# Patient Record
Sex: FEMALE | Race: WHITE | NOT HISPANIC OR LATINO
[De-identification: names, ages, dates, MRNs, and addresses within clinical notes are randomized per-mention and may not be internally consistent; named-entity substitution may affect disease eponyms.]

---

## 2018-05-23 ENCOUNTER — APPOINTMENT (OUTPATIENT)
Dept: VASCULAR SURGERY | Facility: CLINIC | Age: 83
End: 2018-05-23
Payer: MEDICARE

## 2018-05-23 PROBLEM — Z00.00 ENCOUNTER FOR PREVENTIVE HEALTH EXAMINATION: Status: ACTIVE | Noted: 2018-05-23

## 2018-05-23 PROCEDURE — 93880 EXTRACRANIAL BILAT STUDY: CPT

## 2018-09-27 ENCOUNTER — INPATIENT (INPATIENT)
Facility: HOSPITAL | Age: 83
LOS: 3 days | Discharge: HOME | DRG: 305 | End: 2018-10-01
Attending: INTERNAL MEDICINE | Admitting: INTERNAL MEDICINE
Payer: MEDICARE

## 2018-09-27 VITALS
SYSTOLIC BLOOD PRESSURE: 196 MMHG | WEIGHT: 151.9 LBS | TEMPERATURE: 98 F | DIASTOLIC BLOOD PRESSURE: 94 MMHG | RESPIRATION RATE: 16 BRPM | OXYGEN SATURATION: 97 % | HEART RATE: 74 BPM

## 2018-09-27 LAB
ALBUMIN SERPL ELPH-MCNC: 4.6 G/DL — SIGNIFICANT CHANGE UP (ref 3.3–5)
ALP SERPL-CCNC: 47 U/L — SIGNIFICANT CHANGE UP (ref 40–120)
ALT FLD-CCNC: 14 U/L — SIGNIFICANT CHANGE UP (ref 10–45)
ANION GAP SERPL CALC-SCNC: 13 MMOL/L — SIGNIFICANT CHANGE UP (ref 5–17)
APTT BLD: 28.5 SEC — SIGNIFICANT CHANGE UP (ref 27.5–37.4)
AST SERPL-CCNC: 17 U/L — SIGNIFICANT CHANGE UP (ref 10–40)
BASOPHILS # BLD AUTO: 0 K/UL — SIGNIFICANT CHANGE UP (ref 0–0.2)
BASOPHILS NFR BLD AUTO: 0.3 % — SIGNIFICANT CHANGE UP (ref 0–2)
BILIRUB SERPL-MCNC: 0.2 MG/DL — SIGNIFICANT CHANGE UP (ref 0.2–1.2)
BUN SERPL-MCNC: 14 MG/DL — SIGNIFICANT CHANGE UP (ref 7–23)
CALCIUM SERPL-MCNC: 9.9 MG/DL — SIGNIFICANT CHANGE UP (ref 8.4–10.5)
CHLORIDE SERPL-SCNC: 88 MMOL/L — LOW (ref 96–108)
CO2 SERPL-SCNC: 26 MMOL/L — SIGNIFICANT CHANGE UP (ref 22–31)
CREAT SERPL-MCNC: 0.84 MG/DL — SIGNIFICANT CHANGE UP (ref 0.5–1.3)
EOSINOPHIL # BLD AUTO: 0 K/UL — SIGNIFICANT CHANGE UP (ref 0–0.5)
EOSINOPHIL NFR BLD AUTO: 0.3 % — SIGNIFICANT CHANGE UP (ref 0–6)
GLUCOSE SERPL-MCNC: 120 MG/DL — HIGH (ref 70–99)
HCT VFR BLD CALC: 39.5 % — SIGNIFICANT CHANGE UP (ref 34.5–45)
HGB BLD-MCNC: 13.3 G/DL — SIGNIFICANT CHANGE UP (ref 11.5–15.5)
INR BLD: 1.08 RATIO — SIGNIFICANT CHANGE UP (ref 0.88–1.16)
LYMPHOCYTES # BLD AUTO: 1.2 K/UL — SIGNIFICANT CHANGE UP (ref 1–3.3)
LYMPHOCYTES # BLD AUTO: 11.6 % — LOW (ref 13–44)
MCHC RBC-ENTMCNC: 29.1 PG — SIGNIFICANT CHANGE UP (ref 27–34)
MCHC RBC-ENTMCNC: 33.7 GM/DL — SIGNIFICANT CHANGE UP (ref 32–36)
MCV RBC AUTO: 86.3 FL — SIGNIFICANT CHANGE UP (ref 80–100)
MONOCYTES # BLD AUTO: 0.7 K/UL — SIGNIFICANT CHANGE UP (ref 0–0.9)
MONOCYTES NFR BLD AUTO: 6.7 % — SIGNIFICANT CHANGE UP (ref 2–14)
NEUTROPHILS # BLD AUTO: 8.3 K/UL — HIGH (ref 1.8–7.4)
NEUTROPHILS NFR BLD AUTO: 81.1 % — HIGH (ref 43–77)
PLATELET # BLD AUTO: 322 K/UL — SIGNIFICANT CHANGE UP (ref 150–400)
POTASSIUM SERPL-MCNC: 4.5 MMOL/L — SIGNIFICANT CHANGE UP (ref 3.5–5.3)
POTASSIUM SERPL-SCNC: 4.5 MMOL/L — SIGNIFICANT CHANGE UP (ref 3.5–5.3)
PROT SERPL-MCNC: 7.4 G/DL — SIGNIFICANT CHANGE UP (ref 6–8.3)
PROTHROM AB SERPL-ACNC: 11.7 SEC — SIGNIFICANT CHANGE UP (ref 9.8–12.7)
RBC # BLD: 4.57 M/UL — SIGNIFICANT CHANGE UP (ref 3.8–5.2)
RBC # FLD: 12.6 % — SIGNIFICANT CHANGE UP (ref 10.3–14.5)
SODIUM SERPL-SCNC: 127 MMOL/L — LOW (ref 135–145)
TROPONIN T, HIGH SENSITIVITY RESULT: 17 NG/L — SIGNIFICANT CHANGE UP (ref 0–51)
WBC # BLD: 10.2 K/UL — SIGNIFICANT CHANGE UP (ref 3.8–10.5)
WBC # FLD AUTO: 10.2 K/UL — SIGNIFICANT CHANGE UP (ref 3.8–10.5)

## 2018-09-27 PROCEDURE — 71046 X-RAY EXAM CHEST 2 VIEWS: CPT | Mod: 26

## 2018-09-27 PROCEDURE — 99285 EMERGENCY DEPT VISIT HI MDM: CPT | Mod: 25

## 2018-09-27 PROCEDURE — 70450 CT HEAD/BRAIN W/O DYE: CPT | Mod: 26

## 2018-09-27 PROCEDURE — 93010 ELECTROCARDIOGRAM REPORT: CPT

## 2018-09-27 RX ORDER — LOSARTAN POTASSIUM 100 MG/1
25 TABLET, FILM COATED ORAL ONCE
Qty: 0 | Refills: 0 | Status: COMPLETED | OUTPATIENT
Start: 2018-09-27 | End: 2018-09-27

## 2018-09-27 RX ADMIN — LOSARTAN POTASSIUM 25 MILLIGRAM(S): 100 TABLET, FILM COATED ORAL at 22:48

## 2018-09-27 NOTE — ED ADULT NURSE NOTE - OBJECTIVE STATEMENT
84 yo female complaining of "I took my BP at home today and it was higher than usual 215/73 and I called my doctor, I took all my BP medication and the BP remained high so he told me to come to the ED. I am feeling off today, cannot explain but just shaky and off balance. I do not feel chest pain, SOB, headache or any other pain". Pt alerted and oriented x3, no signs of acute distress noted at this moment except high BP. MD at the bedside, will continue to monitor closely. heart sounds normal, lungs clear, abdomen soft, non distended.

## 2018-09-27 NOTE — ED PROVIDER NOTE - NS ED ROS FT
Constitutional: No fever or chills  Eyes: No visual changes, eye pain or redness  HEENT: No throat pain, ear pain, nasal pain. No nose bleeding.  CV: No chest pain or lower extremity edema  Resp: No SOB no cough  GI: No abd pain. No nausea or vomiting. No diarrhea. No constipation.   : No dysuria, hematuria.   MSK: No musculoskeletal pain  Skin: No rash  Neuro: No headache. No numbness or tingling. No weakness. +lightheadedness, resolved. Unsteady gait, baseline.

## 2018-09-27 NOTE — ED ADULT NURSE NOTE - NSIMPLEMENTINTERV_GEN_ALL_ED
Implemented All Universal Safety Interventions:  [place] to call system. Call bell, personal items and telephone within reach. Instruct patient to call for assistance. Room bathroom lighting operational. Non-slip footwear when patient is off stretcher. Physically safe environment: no spills, clutter or unnecessary equipment. Stretcher in lowest position, wheels locked, appropriate side rails in place.

## 2018-09-27 NOTE — ED PROVIDER NOTE - OBJECTIVE STATEMENT
84 y/o F pmhx htn and 'balance problems' longstanding, presenting with episode of elevated BP at home associated with lightheadedness. Patient states that she was in her usual state of health today when she started to feel lightheaded and had some 'maybe weakness' of both of her arms. She states that she checked her BP at that time and was 215/80. At that time she called her pmd Dr. Isaac Figueroa and he instructed her to come to the ER. Pt states that she takes metoprolol 50mg twice daily, verapamil 240mg once daily and HCTZ a few times per week. She reports that she took her nighttime dose of metoprolol and her HCTZ when noting this number and then came to the ER. She states that she 'felt slightly unbalanced' on her feet, but not different from her baseline. She denies any chest pain, shortness of breath, nausea, blurred vision, headache, changes in vision, back pain, numbness or tingling.

## 2018-09-27 NOTE — ED PROVIDER NOTE - MEDICAL DECISION MAKING DETAILS
Elderly woman c HTN on multiple anti-hypertensive agents but states has not been compliant c HCTZ (takes 1/wk instead of 3/wk).  Describes vague ?lightheadedness? v weakness in LE.  Not present currently, does not seem c/w CVA or TIA -- symptoms were bilateral, no paresthesias, no slurring of speech or AMS.  At present patient has no symptoms.  HTN is downtrending -- initially 200s systolic, now 170s/80s in b/l UE.  Presentation is not c/w dissection.  Very doubtful to represent acute coronary syndrome.  Neuro exam entirely WNL.  Will check trop, ekg, CT head, labs, and s/w her cardiologist.  --MUNIRM

## 2018-09-27 NOTE — ED PROVIDER NOTE - PROGRESS NOTE DETAILS
Case and plan discussed with Dr. Figueroa. Informed him that BP has come down without intervention to 177/83 and bilateral UE BPs are equal. CT head negative, xray normal. He reports that he wants to add another medication to her regimen, recommending losartan 25mg daily. Agrees that if all labs are within normal limits patient can be stable to be d/c'ed to follow-up with him. -MARCIAL BallardC Case and plan discussed with Dr. Figueroa. Informed him that BP has come down without intervention to 177/83 and bilateral UE BPs are equal. CT head negative, xray normal. He reports that he wants to add another medication to her regimen, recommending losartan 25mg daily. Agrees that if all labs are within normal limits patient can be stable to be d/c'ed to follow-up with him. Patient has scheduled appointment to see Dr. Figueroa tomorrow. Will keep this appointment. -Katia Plaza PA-C Patient's BP elevated again, evaluation of bloodwork reveals significant hyponatremia. discussed again with Dr. Figueroa who states patient has had hyponatremia in the past but not this significant at time when not taking HCTZ consistently. He recommends admission to the hospital and giving another dose of metoprolol 25mg PO at this time and will continue to attempt tighter control as inpatient. -Katia Plaza PA-C Patient's BP elevated again, evaluation of bloodwork reveals significant hyponatremia. discussed again with Dr. Figueroa who states patient has had hyponatremia in the past but not this significant at time when not taking HCTZ consistently. He recommends admission to the hospital and giving another dose of metoprolol 25mg PO at this time and will continue to attempt tighter control as inpatient. -Katia Plaza PA-C  Attending Statement: Agree with the above.  Given hyponatremia and continued bp 200/80s (though asymptomatic) pt should be admitted.  She is euvolemic; will fluid restrict at this time pending ulytes.  --BMM

## 2018-09-28 DIAGNOSIS — I10 ESSENTIAL (PRIMARY) HYPERTENSION: ICD-10-CM

## 2018-09-28 DIAGNOSIS — I16.0 HYPERTENSIVE URGENCY: ICD-10-CM

## 2018-09-28 DIAGNOSIS — K21.9 GASTRO-ESOPHAGEAL REFLUX DISEASE WITHOUT ESOPHAGITIS: ICD-10-CM

## 2018-09-28 DIAGNOSIS — R00.0 TACHYCARDIA, UNSPECIFIED: ICD-10-CM

## 2018-09-28 DIAGNOSIS — E87.1 HYPO-OSMOLALITY AND HYPONATREMIA: ICD-10-CM

## 2018-09-28 DIAGNOSIS — Z98.49 CATARACT EXTRACTION STATUS, UNSPECIFIED EYE: Chronic | ICD-10-CM

## 2018-09-28 LAB
ANION GAP SERPL CALC-SCNC: 11 MMOL/L — SIGNIFICANT CHANGE UP (ref 5–17)
BASOPHILS # BLD AUTO: 0.01 K/UL — SIGNIFICANT CHANGE UP (ref 0–0.2)
BASOPHILS NFR BLD AUTO: 0.1 % — SIGNIFICANT CHANGE UP (ref 0–2)
BUN SERPL-MCNC: 12 MG/DL — SIGNIFICANT CHANGE UP (ref 7–23)
CALCIUM SERPL-MCNC: 9.5 MG/DL — SIGNIFICANT CHANGE UP (ref 8.4–10.5)
CHLORIDE SERPL-SCNC: 90 MMOL/L — LOW (ref 96–108)
CHLORIDE UR-SCNC: 69 MMOL/L — SIGNIFICANT CHANGE UP
CO2 SERPL-SCNC: 25 MMOL/L — SIGNIFICANT CHANGE UP (ref 22–31)
CREAT SERPL-MCNC: 0.81 MG/DL — SIGNIFICANT CHANGE UP (ref 0.5–1.3)
EOSINOPHIL # BLD AUTO: 0.08 K/UL — SIGNIFICANT CHANGE UP (ref 0–0.5)
EOSINOPHIL NFR BLD AUTO: 0.9 % — SIGNIFICANT CHANGE UP (ref 0–6)
GLUCOSE SERPL-MCNC: 91 MG/DL — SIGNIFICANT CHANGE UP (ref 70–99)
HCT VFR BLD CALC: 36.6 % — SIGNIFICANT CHANGE UP (ref 34.5–45)
HGB BLD-MCNC: 11.9 G/DL — SIGNIFICANT CHANGE UP (ref 11.5–15.5)
IMM GRANULOCYTES NFR BLD AUTO: 0.2 % — SIGNIFICANT CHANGE UP (ref 0–1.5)
LYMPHOCYTES # BLD AUTO: 1.97 K/UL — SIGNIFICANT CHANGE UP (ref 1–3.3)
LYMPHOCYTES # BLD AUTO: 21.9 % — SIGNIFICANT CHANGE UP (ref 13–44)
MAGNESIUM SERPL-MCNC: 1.8 MG/DL — SIGNIFICANT CHANGE UP (ref 1.6–2.6)
MCHC RBC-ENTMCNC: 27.7 PG — SIGNIFICANT CHANGE UP (ref 27–34)
MCHC RBC-ENTMCNC: 32.5 GM/DL — SIGNIFICANT CHANGE UP (ref 32–36)
MCV RBC AUTO: 85.3 FL — SIGNIFICANT CHANGE UP (ref 80–100)
MONOCYTES # BLD AUTO: 1.09 K/UL — HIGH (ref 0–0.9)
MONOCYTES NFR BLD AUTO: 12.1 % — SIGNIFICANT CHANGE UP (ref 2–14)
NEUTROPHILS # BLD AUTO: 5.84 K/UL — SIGNIFICANT CHANGE UP (ref 1.8–7.4)
NEUTROPHILS NFR BLD AUTO: 64.8 % — SIGNIFICANT CHANGE UP (ref 43–77)
OSMOLALITY SERPL: 268 MOS/KG — LOW (ref 275–300)
PHOSPHATE SERPL-MCNC: 3.6 MG/DL — SIGNIFICANT CHANGE UP (ref 2.5–4.5)
PLATELET # BLD AUTO: 295 K/UL — SIGNIFICANT CHANGE UP (ref 150–400)
POTASSIUM SERPL-MCNC: 4 MMOL/L — SIGNIFICANT CHANGE UP (ref 3.5–5.3)
POTASSIUM SERPL-SCNC: 4 MMOL/L — SIGNIFICANT CHANGE UP (ref 3.5–5.3)
POTASSIUM UR-SCNC: 42 MMOL/L — SIGNIFICANT CHANGE UP
RBC # BLD: 4.29 M/UL — SIGNIFICANT CHANGE UP (ref 3.8–5.2)
RBC # FLD: 13.5 % — SIGNIFICANT CHANGE UP (ref 10.3–14.5)
SODIUM SERPL-SCNC: 126 MMOL/L — LOW (ref 135–145)
SODIUM UR-SCNC: 74 MMOL/L — SIGNIFICANT CHANGE UP
TROPONIN T, HIGH SENSITIVITY RESULT: 16 NG/L — SIGNIFICANT CHANGE UP (ref 0–51)
TSH SERPL-MCNC: 7.78 UIU/ML — HIGH (ref 0.27–4.2)
WBC # BLD: 9.01 K/UL — SIGNIFICANT CHANGE UP (ref 3.8–10.5)
WBC # FLD AUTO: 9.01 K/UL — SIGNIFICANT CHANGE UP (ref 3.8–10.5)

## 2018-09-28 PROCEDURE — 99223 1ST HOSP IP/OBS HIGH 75: CPT | Mod: AI

## 2018-09-28 RX ORDER — METOPROLOL TARTRATE 50 MG
50 TABLET ORAL EVERY 12 HOURS
Qty: 0 | Refills: 0 | Status: DISCONTINUED | OUTPATIENT
Start: 2018-09-28 | End: 2018-10-01

## 2018-09-28 RX ORDER — LOSARTAN POTASSIUM 100 MG/1
25 TABLET, FILM COATED ORAL AT BEDTIME
Qty: 0 | Refills: 0 | Status: DISCONTINUED | OUTPATIENT
Start: 2018-09-28 | End: 2018-10-01

## 2018-09-28 RX ORDER — VERAPAMIL HCL 240 MG
120 CAPSULE, EXTENDED RELEASE PELLETS 24 HR ORAL DAILY
Qty: 0 | Refills: 0 | Status: DISCONTINUED | OUTPATIENT
Start: 2018-09-28 | End: 2018-10-01

## 2018-09-28 RX ORDER — HEPARIN SODIUM 5000 [USP'U]/ML
5000 INJECTION INTRAVENOUS; SUBCUTANEOUS EVERY 12 HOURS
Qty: 0 | Refills: 0 | Status: DISCONTINUED | OUTPATIENT
Start: 2018-09-28 | End: 2018-10-01

## 2018-09-28 RX ORDER — METOPROLOL TARTRATE 50 MG
1 TABLET ORAL
Qty: 0 | Refills: 0 | COMMUNITY

## 2018-09-28 RX ORDER — VERAPAMIL HCL 240 MG
1 CAPSULE, EXTENDED RELEASE PELLETS 24 HR ORAL
Qty: 0 | Refills: 0 | COMMUNITY

## 2018-09-28 RX ORDER — PANTOPRAZOLE SODIUM 20 MG/1
40 TABLET, DELAYED RELEASE ORAL
Qty: 0 | Refills: 0 | Status: DISCONTINUED | OUTPATIENT
Start: 2018-09-28 | End: 2018-10-01

## 2018-09-28 RX ORDER — PREGABALIN 225 MG/1
1000 CAPSULE ORAL ONCE
Qty: 0 | Refills: 0 | Status: COMPLETED | OUTPATIENT
Start: 2018-09-28 | End: 2018-09-29

## 2018-09-28 RX ORDER — METOPROLOL TARTRATE 50 MG
25 TABLET ORAL ONCE
Qty: 0 | Refills: 0 | Status: COMPLETED | OUTPATIENT
Start: 2018-09-28 | End: 2018-09-28

## 2018-09-28 RX ADMIN — Medication 50 MILLIGRAM(S): at 19:03

## 2018-09-28 RX ADMIN — Medication 120 MILLIGRAM(S): at 21:24

## 2018-09-28 RX ADMIN — LOSARTAN POTASSIUM 25 MILLIGRAM(S): 100 TABLET, FILM COATED ORAL at 21:28

## 2018-09-28 RX ADMIN — PANTOPRAZOLE SODIUM 40 MILLIGRAM(S): 20 TABLET, DELAYED RELEASE ORAL at 05:06

## 2018-09-28 RX ADMIN — Medication 25 MILLIGRAM(S): at 01:34

## 2018-09-28 NOTE — H&P ADULT - PROBLEM SELECTOR PLAN 3
etiology most likely 2 hctz doses as pt admits to severe hyponatremia with hctz in past  would avoid diuretics and use other class agents for bp control  ck serum and urine osm, ck tsh and cortisol as pt appears euvolemic

## 2018-09-28 NOTE — CONSULT NOTE ADULT - SUBJECTIVE AND OBJECTIVE BOX
FULL NOTE TO FOLLOW        Patient seen and evaluated @   Chief Complaint:     HPI:  85 f with htn, GERD, "fast heart beat" a/w concern for elevated BP.  Pt states she has a long h/o htn requiring med adjustments.  Takes home bp measurements a few times a week, noticed this last week sbps more elevated up to 170 with low 142.  States no change in food or drink intake and had not been compliant with hctz.  Pt also states that in the past she was admitted for severe hyponatremia and takes hctz prn elevated bp's.  She took a dose on Tuesday.  Yesterday she started to feel weakness in her legs and a sense of light-headedness.  She also says her arms felt "funny" and "almost numb" briefly.  Was having calf cramps.  She took her bp and it was 215/80.  She quickly took a dose of hctz and metoprolol 50mg and called her PMD.  She was told to go to ED.    In ED initial bp 205/72.  Pt was asymptomatic.  Given metoprolol 25mg and losartan 25mg.  bp improved to 165/72.  of note pt had na 127 and previous sodium levels in computer wnl.  No ha/n/v/cp/sob/cough/palps. (28 Sep 2018 04:02)    PMH:   Gastroesophageal reflux disease, esophagitis presence not specified  Hypertension    PSH:   History of cataract extraction, unspecified laterality    Medications:   heparin  Injectable 5000 Unit(s) SubCutaneous every 12 hours  metoprolol tartrate 50 milliGRAM(s) Oral every 12 hours  pantoprazole    Tablet 40 milliGRAM(s) Oral before breakfast  predniSONE   Tablet 3 milliGRAM(s) Oral daily    Allergies:  penicillin (Hives)    FAMILY HISTORY:  Family history of acute myocardial infarction (Father)    Social History:  Smoking:  Alcohol:  Drugs:    REVIEW OF SYSTEMS:  CONSTITUTIONAL: No weakness, fevers or chills  EYES/ENT: No visual changes;  No vertigo or throat pain   NECK: No pain or stiffness  RESPIRATORY: No cough, wheezing, hemoptysis; No shortness of breath  CARDIOVASCULAR: No chest pain or palpitations  GASTROINTESTINAL: No abdominal or epigastric pain. No nausea, vomiting, or hematemesis; No diarrhea or constipation. No melena or hematochezia.  GENITOURINARY: No dysuria, frequency or hematuria  NEUROLOGICAL: No numbness or weakness  SKIN: No itching, burning, rashes, or lesions   All other review of systems is negative unless indicated above    Physical Exam:  T(C): 36.6 (09-28-18 @ 08:13), Max: 36.7 (09-27-18 @ 19:32)  HR: 61 (09-28-18 @ 10:11) (51 - 74)  BP: 135/73 (09-28-18 @ 10:11) (127/76 - 205/72)  RR: 18 (09-28-18 @ 08:13) (16 - 18)  SpO2: 96% (09-28-18 @ 08:13) (95% - 99%)  Wt(kg): --    Daily     Daily     Appearance:  Normal, NAD  Eyes:  PERRL, EOMI  HEENT: Normal oral [place], NC/AT  Neck:  No JVD or HJR  Respiratory: Clear to auscultation bilaterally  Cardiovascular: Normal S1 and S2 without murmurs, rubs or gallops  Abdomen:   BS normal, Soft,  Non-tender without organomegally or masses  Extremities: Without edema, pulses are full      Labs:                        11.9   9.01  )-----------( 295      ( 28 Sep 2018 09:30 )             36.6     09-28    126<L>  |  90<L>  |  12  ----------------------------<  91  4.0   |  25  |  0.81    Ca    9.5      28 Sep 2018 05:55  Phos  3.6     09-28  Mg     1.8     09-28    TPro  7.4  /  Alb  4.6  /  TBili  0.2  /  DBili  x   /  AST  17  /  ALT  14  /  AlkPhos  47  09-27    PT/INR - ( 27 Sep 2018 20:37 )   PT: 11.7 sec;   INR: 1.08 ratio         PTT - ( 27 Sep 2018 20:37 )  PTT:28.5 sec                  ECG:    Echo:    Stress Testing:    Cath:    Interpretation of Telemetry:    Imaging: Patient seen and evaluated @ bedside  Chief Complaint: Hypertension    HPI:  85 f sent to hospital for elevated BP.  Pt states she has a long h/o htn requiring med adjustments but has been on stable dose of metoprolol 50 bid , verapamil 240 mg qd and HCTZ 12.5 mg biw (but often only takes once a week).  Takes home bp measurements a few times a week, noticed this last week sbps more elevated up to 170 with low 142.  States no change in food or drink intake and had not been compliant with hctz which she generally only takes once a week.  Pt also states that in the past she was admitted for severe hyponatremia coincident with diarrhea and when on HCTZ ? daily  On her own takes hctz prn elevated bp's but generally never more than 3x/week.  She took a dose on Tuesday.  Yesterday she started to feel weakness in her legs and a sense of light-headedness.  She also says her arms felt "funny" and "almost numb" briefly.  Was having calf cramps.  She took her bp and it was 215/80.  She quickly took a dose of hctz and metoprolol 50mg and called me and was told to go to ED.    In ED initial bp 205/72.  Pt was asymptomatic.  Given metoprolol 25mg and losartan 25mg.  bp improved to 165/72.  of note pt had na 127 and previous sodium levels in computer wnl.  No ha/n/v/cp/sob/cough/palps. 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  Hypertension  Supraventricular tachycardia  Asthma since childhood  Diverticulitis x 3 episodes  Temporal arteritis, PMR on steroids  Moderate carotid disease  Gallstones symptomatic once  Prediabetes  Spinal stenosis  Pernicious anemia  BL carpal tunnel syndrome  Basal cell carcinoma eyelid  Adnexal cyst  IgG kappa paraproteinemia  Vertigo    PSH:   History of cataract extraction, unspecified laterality  T&A as child  Blepharoplasty OD 2013  L lower lid Moh's 2013    Medications:   heparin  Injectable 5000 Unit(s) SubCutaneous every 12 hours  metoprolol tartrate 50 milliGRAM(s) Oral every 12 hours  pantoprazole    Tablet 40 milliGRAM(s) Oral before breakfast  predniSONE   Tablet 3 milliGRAM(s) Oral daily    Allergies:  penicillin (Hives)    FAMILY HISTORY:  Family history of acute myocardial infarction (Father)  Sister CHF, HTN,PVD  Sister bladder cancer, HTN  2 brothers bladder cancer  1 brother PVD    Social History:  Smoking: Never      REVIEW OF SYSTEMS:  CONSTITUTIONAL: No weakness, fevers or chills  EYES/ENT: No visual changes;  No vertigo or throat pain   NECK: No pain or stiffness  RESPIRATORY: No shortness of breath  CARDIOVASCULAR: No chest pain or current palpitations  GASTROINTESTINAL: No abdominal or epigastric pain. No nausea, vomiting, or hematemesis; No diarrhea or constipation. No melena or hematochezia.  NEUROLOGICAL: See HPI    All other review of systems is negative unless indicated above    Physical Exam:  T(C): 36.6 (09-28-18 @ 08:13), Max: 36.7 (09-27-18 @ 19:32)  HR: 61 (09-28-18 @ 10:11) (51 - 74)  BP: 135/73 (09-28-18 @ 10:11) (127/76 - 205/72)  RR: 18 (09-28-18 @ 08:13) (16 - 18)  SpO2: 96% (09-28-18 @ 08:13) (95% - 99%)  Wt(kg): --    Daily     Daily     Appearance:  Normal, NAD  Eyes:  EOMI  HEENT: Normal oral [place], NC/AT  Neck:  No JVD or HJR  Respiratory: Clear to auscultation bilaterally  Cardiovascular: Normal S1 and S2 with 1/6 systolic murmur base and LSB.  NO rubs or gallops  Abdomen:   BS normal, Soft,  Non-tender without organomegaly or masses  Extremities: Without edema      Labs:                        11.9   9.01  )-----------( 295      ( 28 Sep 2018 09:30 )             36.6     09-28    126<L>  |  90<L>  |  12  ----------------------------<  91  4.0   |  25  |  0.81    Ca    9.5      28 Sep 2018 05:55  Phos  3.6     09-28  Mg     1.8     09-28    TPro  7.4  /  Alb  4.6  /  TBili  0.2  /  DBili  x   /  AST  17  /  ALT  14  /  AlkPhos  47  09-27    PT/INR - ( 27 Sep 2018 20:37 )   PT: 11.7 sec;   INR: 1.08 ratio         PTT - ( 27 Sep 2018 20:37 )  PTT:28.5 sec

## 2018-09-28 NOTE — H&P ADULT - HISTORY OF PRESENT ILLNESS
85 f with htn, GERD, "fast heart beat" a/w concern for elevated BP.  Pt states she has a long h/o htn requiring med adjustments.  Takes home bp measurements a few times a week, noticed this last week sbps more elevated up to 170 with low 142.  States no change in food or drink intake and had not been compliant with hctz.  Pt also states that in the past she was admitted for severe hyponatremia and takes hctz prn elevated bp's.  She took a dose on Tuesday.  Yesterday she started to feel weakness in her legs and a sense of light-headedness.  She also says her arms felt "funny" and "almost numb" briefly.  Was having calf cramps.  She took her bp and it was 215/80.  She quickly took a dose of hctz and metoprolol 50mg and called her PMD.  She was told to go to ED.    In ED initial bp 205/72.  Pt was asymptomatic.  Given metoprolol 25mg and losartan 25mg.  bp improved to 165/72.  of note pt had na 127 and previous sodium levels in computer wnl.  No ha/n/v/cp/sob/cough/palps.

## 2018-09-28 NOTE — CONSULT NOTE ADULT - SUBJECTIVE AND OBJECTIVE BOX
HPI:  Ms. Hoyt is an 85 year-old woman with history notable for hypertension, GERD, and cataracts. She presented last night to the [facility] ER with hypertensive urgency. She states that she has had hypertension for several years. She checks her blood pressure at home multiple times per week. Last week she noted that her systolic BP has risen as high as 170mmHg. She attests to complying with her Toprol as ordered, but not being compliant of late with HCTZ, although she did take a dose this past Tuesday. Yesterday she developed weakness of her legs, as well as cramping of the legs, and lightheadedness. She checked her BP and found it to be 215/80. In addition to taking HCTZ and Metoprolol, she called her PMD, who advised her to go to the ER. Initial BP in the ER was 200s/70s. She received a STAT dose of Cozaar 25mg last night.    Ms. Hoyt was noted as well in the ER to have hyponatremia - it was 127meq/L on first check; on the most recent bloodwork it was 126meq/L. In light of the hypertension as well as the hyponatremia, a renal consultation was requested.    Home BP meds: Dyazide 25-37.5 daily; Toprol 50mg BID    PAST MEDICAL & SURGICAL HISTORY:  Gastroesophageal reflux disease, esophagitis presence not specified  Hypertension  History of cataract extraction, unspecified laterality    Allergies  penicillin (Hives)    SOCIAL HISTORY:  Denies ETOh,Smoking,     FAMILY HISTORY:  Family history of acute myocardial infarction (Father)    REVIEW OF SYSTEMS:  CONSTITUTIONAL: (+)weakness; no fevers or chills  EYES/ENT: No visual changes;  No vertigo or throat pain   NECK: No pain or stiffness  RESPIRATORY: No cough, wheezing, hemoptysis; No shortness of breath  CARDIOVASCULAR: No chest pain or palpitations; (+)lightheadedness  GASTROINTESTINAL: No abdominal or epigastric pain. No nausea, vomiting, or hematemesis; No diarrhea or constipation. No melena or hematochezia.  GENITOURINARY: No dysuria, frequency or hematuria  NEUROLOGICAL: No numbness or tremor  SKIN: No itching, burning, rashes, or lesions   MUSCULOSKELETAL: (+)leg weakness; (+)cramping of legs  All other review of systems is negative unless indicated above.    VITAL:  T(C): , Max: 36.7 (09-27-18 @ 19:32)  T(F): , Max: 98.1 (09-27-18 @ 19:32)  HR: 61 (09-28-18 @ 10:11)  BP: 135/73 (09-28-18 @ 10:11)  RR: 18 (09-28-18 @ 08:13)  SpO2: 96% (09-28-18 @ 08:13)    PHYSICAL EXAM:  Constitutional: NAD, Alert  HEENT: NCAT, MMM  Neck: Supple, No JVD  Respiratory: CTA-b/l  Cardiovascular: RRR s1s2, no m/r/g  Gastrointestinal: BS+, soft, NT/ND  Extremities: No peripheral edema b/l  Neurological: no focal deficits; strength grossly intact  Psychiatric: Normal mood, normal affect  Back: no CVAT b/l  Skin: No rashes, no nevi    LABS:                        11.9   9.01  )-----------( 295      ( 28 Sep 2018 09:30 )             36.6     Na(126)/K(4.0)/Cl(90)/HCO3(25)/BUN(12)/Cr(0.81)Glu(91)/Ca(9.5)/Mg(1.8)/PO4(3.6)    09-28 @ 05:55  Na(127)/K(4.5)/Cl(88)/HCO3(26)/BUN(14)/Cr(0.84)Glu(120)/Ca(9.9)/Mg(--)/PO4(--)    09-27 @ 20:37    IMAGING:  < from: Xray Chest 2 Views PA/Lat (09.27.18 @ 20:52) >  Clear lungs.    ASSESSMENT:  (1)HTN - suboptimal control of late - unclear as to why her BP kaylin so high, so fast. Due to hypervolemia from lack of compliance with her diuretic? Her CXR is clear. Of note, BP is much improved at present, on Toprol, and s/p Cozaar last night.    (2)Hyponatremia - likely thiazide-induced      RECOMMEND:  (1)1 liter free water restriction  (2)No further thiazides for now  (3)No need for salt tabs nor for samsca for now  (4)BMP daily  (5)Toprol as taken at home  (6)Cozaar 25mg po qhs for now    Thank you for involving [facility] in this patient's care.    With warm regards,    Ulices Campos MD   [facility]  [phone] HPI:  Ms. Hoyt is an 85 year-old woman with history notable for hypertension, GERD, and cataracts. She presented last night to the [facility] ER with hypertensive urgency. She states that she has had hypertension for several years. She checks her blood pressure at home multiple times per week. Last week she noted that her systolic BP has risen as high as 170mmHg. She attests to complying with her Toprol as ordered, but not being compliant of late with HCTZ, although she did take a dose this past Tuesday. Yesterday she developed weakness of her legs, as well as cramping of the legs, and lightheadedness. She checked her BP and found it to be 215/80. In addition to taking HCTZ and Metoprolol, she called her PMD, who advised her to go to the ER. Initial BP in the ER was 200s/70s. She received a STAT dose of Cozaar 25mg last night.    Ms. Hoyt was noted as well in the ER to have hyponatremia - it was 127meq/L on first check; on the most recent bloodwork it was 126meq/L. In light of the hypertension as well as the hyponatremia, a renal consultation was requested.    Home BP meds: Dyazide 25-37.5 (once per week on average); Toprol 50mg BID; Verapamil 240 qd    Ms. Hoyt attests to episodic hyponatremia in the past. She states that 1-2 years ago, her sodium level dropped precipitously, requiring admission at [facility]. She states that she drinks 6-8 cups of fluid/day. She eats reasonably well. She admits that prior to admission her urine was "colorless".      PAST MEDICAL & SURGICAL HISTORY:  Gastroesophageal reflux disease, esophagitis presence not specified  Hypertension  History of cataract extraction, unspecified laterality  Hyponatremia    Allergies  penicillin (Hives)    SOCIAL HISTORY:  Denies ETOh,Smoking,     FAMILY HISTORY:  Family history of acute myocardial infarction (Father)    REVIEW OF SYSTEMS:  CONSTITUTIONAL: (+)weakness; no fevers or chills  EYES/ENT: No visual changes;  No vertigo or throat pain   NECK: No pain or stiffness  RESPIRATORY: No cough, wheezing, hemoptysis; No shortness of breath  CARDIOVASCULAR: No chest pain or palpitations; (+)lightheadedness  GASTROINTESTINAL: No abdominal or epigastric pain. No nausea, vomiting, or hematemesis; No diarrhea or constipation. No melena or hematochezia.  GENITOURINARY: No dysuria, frequency or hematuria; (+)colorless urine  NEUROLOGICAL: No numbness or tremor  SKIN: No itching, burning, rashes, or lesions   MUSCULOSKELETAL: (+)leg weakness; (+)cramping of legs  All other review of systems is negative unless indicated above.    VITAL:  T(C): , Max: 36.7 (09-27-18 @ 19:32)  T(F): , Max: 98.1 (09-27-18 @ 19:32)  HR: 61 (09-28-18 @ 10:11)  BP: 135/73 (09-28-18 @ 10:11)  RR: 18 (09-28-18 @ 08:13)  SpO2: 96% (09-28-18 @ 08:13)    PHYSICAL EXAM:  Constitutional: NAD, Alert, pleasant  HEENT: NCAT, MMM  Neck: Supple, No JVD  Respiratory: CTA-b/l  Cardiovascular: RRR s1s2, no m/r/g  Gastrointestinal: BS+, soft, NT/ND  Extremities: No peripheral edema b/l  Neurological: no focal deficits; strength grossly intact  Psychiatric: Normal mood, normal affect  Back: no CVAT b/l  Skin: No rashes, no nevi    LABS:                        11.9   9.01  )-----------( 295      ( 28 Sep 2018 09:30 )             36.6     Na(126)/K(4.0)/Cl(90)/HCO3(25)/BUN(12)/Cr(0.81)Glu(91)/Ca(9.5)/Mg(1.8)/PO4(3.6)    09-28 @ 05:55  Na(127)/K(4.5)/Cl(88)/HCO3(26)/BUN(14)/Cr(0.84)Glu(120)/Ca(9.9)/Mg(--)/PO4(--)    09-27 @ 20:37    IMAGING:  < from: Xray Chest 2 Views PA/Lat (09.27.18 @ 20:52) >  Clear lungs.    ASSESSMENT:  (1)HTN - suboptimal control of late - unclear as to why her BP kaylin so high, so fast. Of note, BP is much improved at present, on Toprol, and s/p Cozaar last night.    (2)Hyponatremia - likely multifactorial from excess water intake relative to osmotic intake, + effect of diuretic. Would look to get urine studies to further investigate, but would like the diuretics out of her system before we check the urine lytes...reasonable to check them tomorrow morning. Hyponatremia is mild enough that for now we could try to get by simply with a free water restriction as well as discontinuation of her diuretics.      RECOMMEND:  (1)1 liter free water restriction  (2)No further thiazides for now  (3)No need for salt tabs nor for samsca for now  (4)BMP daily  (5)Toprol as taken at home  (6)Cozaar 25mg po qhs for now  (7)Urine: lytes, osm...tomorrow a.m.  (8)TSH, uric acid, and serum osm tomorrow with a.m. labs  (9)If serum sodium tomorrow is 125 or lower, would then add NaCl, 1gm po bid.      Thank you for involving [facility] in this patient's care.    With warm regards,    Ulices Campos MD   [facility]  [phone] HPI:  Ms. Hoyt is an 85 year-old woman with history notable for hypertension, GERD, and cataracts. She presented last night to the [facility] ER with hypertensive urgency. She states that she has had hypertension for several years. She checks her blood pressure at home multiple times per week. Last week she noted that her systolic BP has risen as high as 170mmHg. She attests to complying with her Toprol as ordered, but not being compliant of late with HCTZ, although she did take a dose this past Tuesday. Yesterday she developed weakness of her legs, as well as cramping of the legs, and lightheadedness. She checked her BP and found it to be 215/80. In addition to taking HCTZ and Metoprolol, she called her PMD, who advised her to go to the ER. Initial BP in the ER was 200s/70s. She received a STAT dose of Cozaar 25mg last night.    Ms. Hoyt was noted as well in the ER to have hyponatremia - it was 127meq/L on first check; on the most recent bloodwork it was 126meq/L. In light of the hypertension as well as the hyponatremia, a renal consultation was requested.    Home BP meds: Dyazide 25-37.5 (once per week on average); Toprol 50mg BID; Verapamil 240 qd    Ms. Hoyt attests to episodic hyponatremia in the past. She states that 1-2 years ago, her sodium level dropped precipitously, requiring admission at [facility]. She states that she drinks 6-8 cups of fluid/day. She eats reasonably well. She admits that prior to admission her urine was "colorless".      PAST MEDICAL & SURGICAL HISTORY:  Gastroesophageal reflux disease, esophagitis presence not specified  Hypertension  History of cataract extraction, unspecified laterality  Hyponatremia    Allergies  penicillin (Hives)    SOCIAL HISTORY:  Denies ETOh,Smoking,     FAMILY HISTORY:  Family history of acute myocardial infarction (Father)    REVIEW OF SYSTEMS:  CONSTITUTIONAL: (+)weakness; no fevers or chills; (+)episodic sweating  EYES/ENT: No visual changes;  No vertigo or throat pain   NECK: No pain or stiffness  RESPIRATORY: No cough, wheezing, hemoptysis; No shortness of breath  CARDIOVASCULAR: No chest pain or palpitations; (+)lightheadedness  GASTROINTESTINAL: (+)nausea/(+)vomiting/(+)diarrhea, all this afternoon (attributed to bad chicken)  GENITOURINARY: No dysuria, frequency or hematuria; (+)colorless urine  NEUROLOGICAL: No numbness or tremor  SKIN: No itching, burning, rashes, or lesions   MUSCULOSKELETAL: (+)leg weakness; (+)cramping of legs  All other review of systems is negative unless indicated above.    VITAL:  T(C): , Max: 36.7 (09-27-18 @ 19:32)  T(F): , Max: 98.1 (09-27-18 @ 19:32)  HR: 61 (09-28-18 @ 10:11)  BP: 135/73 (09-28-18 @ 10:11)  RR: 18 (09-28-18 @ 08:13)  SpO2: 96% (09-28-18 @ 08:13)    PHYSICAL EXAM:  Constitutional: NAD, Alert, pleasant  HEENT: NCAT, MMM  Neck: Supple, No JVD  Respiratory: CTA-b/l  Cardiovascular: RRR s1s2, no m/r/g  Gastrointestinal: BS+, soft, NT/ND  Extremities: No peripheral edema b/l  Neurological: no focal deficits; strength grossly intact  Psychiatric: Normal mood, normal affect  Back: no CVAT b/l  Skin: No rashes, no nevi    LABS:                        11.9   9.01  )-----------( 295      ( 28 Sep 2018 09:30 )             36.6     Na(126)/K(4.0)/Cl(90)/HCO3(25)/BUN(12)/Cr(0.81)Glu(91)/Ca(9.5)/Mg(1.8)/PO4(3.6)    09-28 @ 05:55  Na(127)/K(4.5)/Cl(88)/HCO3(26)/BUN(14)/Cr(0.84)Glu(120)/Ca(9.9)/Mg(--)/PO4(--)    09-27 @ 20:37    IMAGING:  < from: Xray Chest 2 Views PA/Lat (09.27.18 @ 20:52) >  Clear lungs.    ASSESSMENT:  (1)HTN - suboptimal control of late - unclear as to why her BP kaylin so high, so fast. Of note, BP is much improved at present, on Toprol, and s/p Cozaar last night.    (2)Hyponatremia - likely multifactorial from excess water intake relative to osmotic intake, + effect of diuretic. Would look to get urine studies to further investigate, but would like the diuretics out of her system before we check the urine lytes...reasonable to check them tomorrow morning. Hyponatremia is mild enough that for now we could try to get by simply with a free water restriction as well as discontinuation of her diuretics.      RECOMMEND:  (1)1 liter free water restriction  (2)No further thiazides for now  (3)No need for salt tabs nor for samsca for now  (4)BMP daily  (5)Toprol as taken at home  (6)Cozaar 25mg po qhs for now  (7)Urine: lytes, osm...tomorrow a.m.  (8)TSH, uric acid, and serum osm tomorrow with a.m. labs  (9)If serum sodium tomorrow is 125 or lower, would then add NaCl, 1gm po bid.      Thank you for involving [facility] in this patient's care.    With warm regards,    Ulices Campos MD   [facility]  [phone]

## 2018-09-28 NOTE — H&P ADULT - NSHPLABSRESULTS_GEN_ALL_CORE
labs/studies/ekg reviewed personally by me  wbc 10.2, hb 13.3, plt 322  inr 1.08  na 124, cl 88, bun 14, creat 0.84  nl liver  cth neg  cxr neg  ekg no ischemia, 1st degree block (hard copy not found in ED, interpretation based on ED provider note)

## 2018-09-28 NOTE — CONSULT NOTE ADULT - ASSESSMENT
Impression:  Acute worsening of hypertension of unclear etiology.  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 vasculitis.                        Hyponatremia.  Doubt related to minimal dose of HCTZ generally taken only once weekly and this week twice.  Patient admits to 60 ounces fluid daily.                        History of frequent and prologed episodes of SVT, improved on verapamil.    Plan:  Losartan added to regimen.             Will resume verapamil.             Renal consult re: hyponatremia.             Will order ESR and CRP.

## 2018-09-28 NOTE — H&P ADULT - PROBLEM SELECTOR PLAN 1
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

## 2018-09-29 LAB
ANION GAP SERPL CALC-SCNC: 10 MMOL/L — SIGNIFICANT CHANGE UP (ref 5–17)
BUN SERPL-MCNC: 10 MG/DL — SIGNIFICANT CHANGE UP (ref 7–23)
CALCIUM SERPL-MCNC: 9.5 MG/DL — SIGNIFICANT CHANGE UP (ref 8.4–10.5)
CHLORIDE SERPL-SCNC: 86 MMOL/L — LOW (ref 96–108)
CO2 SERPL-SCNC: 27 MMOL/L — SIGNIFICANT CHANGE UP (ref 22–31)
CREAT SERPL-MCNC: 0.84 MG/DL — SIGNIFICANT CHANGE UP (ref 0.5–1.3)
CRP SERPL-MCNC: 0.85 MG/DL — HIGH (ref 0–0.4)
ERYTHROCYTE [SEDIMENTATION RATE] IN BLOOD: 32 MM/HR — HIGH (ref 0–20)
GLUCOSE SERPL-MCNC: 91 MG/DL — SIGNIFICANT CHANGE UP (ref 70–99)
OSMOLALITY UR: 425 MOS/KG — SIGNIFICANT CHANGE UP (ref 50–1200)
POTASSIUM SERPL-MCNC: 3.6 MMOL/L — SIGNIFICANT CHANGE UP (ref 3.5–5.3)
POTASSIUM SERPL-SCNC: 3.6 MMOL/L — SIGNIFICANT CHANGE UP (ref 3.5–5.3)
SODIUM SERPL-SCNC: 123 MMOL/L — LOW (ref 135–145)
TSH SERPL-MCNC: 4.58 UIU/ML — HIGH (ref 0.27–4.2)
URATE SERPL-MCNC: 4.1 MG/DL — SIGNIFICANT CHANGE UP (ref 2.5–7)

## 2018-09-29 RX ORDER — SODIUM CHLORIDE 5 G/100ML
500 INJECTION, SOLUTION INTRAVENOUS
Qty: 0 | Refills: 0 | Status: COMPLETED | OUTPATIENT
Start: 2018-09-29 | End: 2018-09-29

## 2018-09-29 RX ORDER — SODIUM CHLORIDE 9 MG/ML
1 INJECTION INTRAMUSCULAR; INTRAVENOUS; SUBCUTANEOUS
Qty: 0 | Refills: 0 | Status: DISCONTINUED | OUTPATIENT
Start: 2018-09-29 | End: 2018-10-01

## 2018-09-29 RX ORDER — ONDANSETRON 8 MG/1
4 TABLET, FILM COATED ORAL ONCE
Qty: 0 | Refills: 0 | Status: COMPLETED | OUTPATIENT
Start: 2018-09-29 | End: 2018-09-29

## 2018-09-29 RX ADMIN — SODIUM CHLORIDE 1 GRAM(S): 9 INJECTION INTRAMUSCULAR; INTRAVENOUS; SUBCUTANEOUS at 18:02

## 2018-09-29 RX ADMIN — Medication 50 MILLIGRAM(S): at 13:54

## 2018-09-29 RX ADMIN — Medication 3 MILLIGRAM(S): at 05:33

## 2018-09-29 RX ADMIN — PANTOPRAZOLE SODIUM 40 MILLIGRAM(S): 20 TABLET, DELAYED RELEASE ORAL at 05:32

## 2018-09-29 RX ADMIN — SODIUM CHLORIDE 50 MILLILITER(S): 5 INJECTION, SOLUTION INTRAVENOUS at 11:21

## 2018-09-29 RX ADMIN — Medication 50 MILLIGRAM(S): at 21:24

## 2018-09-29 RX ADMIN — Medication 120 MILLIGRAM(S): at 13:54

## 2018-09-29 RX ADMIN — PREGABALIN 1000 MICROGRAM(S): 225 CAPSULE ORAL at 05:33

## 2018-09-29 RX ADMIN — Medication 5 MILLIGRAM(S): at 11:22

## 2018-09-29 RX ADMIN — LOSARTAN POTASSIUM 25 MILLIGRAM(S): 100 TABLET, FILM COATED ORAL at 21:23

## 2018-09-29 RX ADMIN — ONDANSETRON 4 MILLIGRAM(S): 8 TABLET, FILM COATED ORAL at 09:22

## 2018-09-29 RX ADMIN — SODIUM CHLORIDE 50 MILLILITER(S): 5 INJECTION, SOLUTION INTRAVENOUS at 21:23

## 2018-09-29 NOTE — PROGRESS NOTE ADULT - ASSESSMENT
Assessment  1. Hyponatremia  2. Gastroenteritis  3. HTN  4. ?? Vasculitis    Plan  1. Electrolyte management as per renal  2. Continue current anti-hypertensives

## 2018-09-29 NOTE — PROGRESS NOTE ADULT - SUBJECTIVE AND OBJECTIVE BOX
CHIEF COMPLAINT: complains of an episode of diarrhea and vomitting yesterday      MEDICATIONS:  heparin  Injectable 5000 Unit(s) SubCutaneous every 12 hours  losartan 25 milliGRAM(s) Oral at bedtime  metoprolol tartrate 50 milliGRAM(s) Oral every 12 hours  torsemide 5 milliGRAM(s) Oral <User Schedule>  verapamil  milliGRAM(s) Oral daily          pantoprazole    Tablet 40 milliGRAM(s) Oral before breakfast    predniSONE   Tablet 3 milliGRAM(s) Oral daily    sodium chloride 1 Gram(s) Oral two times a day  sodium chloride 1.5%. 500 milliLiter(s) IV Continuous <Continuous>          PHYSICAL EXAM:  T(C): 36.6 (09-29-18 @ 08:03), Max: 36.8 (09-28-18 @ 21:22)  HR: 57 (09-29-18 @ 09:18) (52 - 69)  BP: 116/51 (09-29-18 @ 09:18) (101/62 - 157/69)  RR: 18 (09-29-18 @ 09:18) (18 - 18)  SpO2: 99% (09-29-18 @ 09:18) (96% - 99%)  Wt(kg): --  I&O's Summary    28 Sep 2018 07:01  -  29 Sep 2018 07:00  --------------------------------------------------------  IN: 620 mL / OUT: 0 mL / NET: 620 mL        Appearance: Normal	  Cardiovascular: Normal S1 S2, No JVD, No murmurs, No edema  Respiratory: Lungs clear to auscultation	  Psychiatry: A & O x 3, Mood & affect appropriate  Gastrointestinal:  Soft, Non-tender, + BS	  Skin: No rashes, No ecchymoses, No cyanosis	  Neurologic: Non-focal  Extremities: Normal range of motion, No clubbing, cyanosis or edema                                    11.9   9.01  )-----------( 295      ( 28 Sep 2018 09:30 )             36.6     09-29    123<L>  |  86<L>  |  10  ----------------------------<  91  3.6   |  27  |  0.84    Ca    9.5      29 Sep 2018 07:19  Phos  3.6     09-28  Mg     1.8     09-28    TPro  7.4  /  Alb  4.6  /  TBili  0.2  /  DBili  x   /  AST  17  /  ALT  14  /  AlkPhos  47  09-27    proBNP:   Lipid Profile:   HgA1c:   TSH: Thyroid Stimulating Hormone, Serum: 4.58 uIU/mL (09-29 @ 08:18)

## 2018-09-29 NOTE — PROGRESS NOTE ADULT - SUBJECTIVE AND OBJECTIVE BOX
NEPHROLOGY - NSN    Patient seen and examined.    MEDICATIONS  (STANDING):  heparin  Injectable 5000 Unit(s) SubCutaneous every 12 hours  losartan 25 milliGRAM(s) Oral at bedtime  metoprolol tartrate 50 milliGRAM(s) Oral every 12 hours  pantoprazole    Tablet 40 milliGRAM(s) Oral before breakfast  predniSONE   Tablet 3 milliGRAM(s) Oral daily  verapamil  milliGRAM(s) Oral daily    VITALS:  T(C): , Max: 36.8 (09-28-18 @ 21:22)  T(F): , Max: 98.2 (09-28-18 @ 21:22)  HR: 57 (09-29-18 @ 09:18)  BP: 116/51 (09-29-18 @ 09:18)  BP(mean): --  RR: 18 (09-29-18 @ 09:18)  SpO2: 99% (09-29-18 @ 09:18)  Wt(kg): --  I and O's:    09-28 @ 07:01  -  09-29 @ 07:00  --------------------------------------------------------  IN: 620 mL / OUT: 0 mL / NET: 620 mL          REVIEW OF SYSTEMS:  Full ROS done and were negative unless otherwise indicated in HPI/assessment.     PHYSICAL EXAM:  Constitutional: NAD  Respiratory: CTA B/L  Cardiovascular: S1 and S2, RRR  Gastrointestinal: + BS, soft, NT, ND  Extremities: No peripheral edema  Neurological: AAO x 3, CN 2-12 intact  : No Goncalves  Access: Not applicable    LABS:                        11.9   9.01  )-----------( 295      ( 28 Sep 2018 09:30 )             36.6     09-29    123<L>  |  86<L>  |  10  ----------------------------<  91  3.6   |  27  |  0.84    Ca    9.5      29 Sep 2018 07:19  Phos  3.6     09-28  Mg     1.8     09-28    TPro  7.4  /  Alb  4.6  /  TBili  0.2  /  DBili  x   /  AST  17  /  ALT  14  /  AlkPhos  47  09-27      Urine Studies:    Osmolality, Random Urine: 425 mos/kg (09-28 @ 20:23)  Sodium, Random Urine: 74 mmol/L (09-28 @ 19:11)  Chloride, Random Urine: 69 mmol/L (09-28 @ 19:11)  Potassium, Random Urine: 42 mmol/L (09-28 @ 19:11)      RADIOLOGY & ADDITIONAL STUDIES:      ASSESSMENT/RECOMMEND    Shayy Patel NP  [facility],   [phone] NEPHROLOGY - NSN    Patient seen and examined.  C/O nausea    MEDICATIONS  (STANDING):  heparin  Injectable 5000 Unit(s) SubCutaneous every 12 hours  losartan 25 milliGRAM(s) Oral at bedtime  metoprolol tartrate 50 milliGRAM(s) Oral every 12 hours  pantoprazole    Tablet 40 milliGRAM(s) Oral before breakfast  predniSONE   Tablet 3 milliGRAM(s) Oral daily  verapamil  milliGRAM(s) Oral daily    VITALS:  T(C): , Max: 36.8 (09-28-18 @ 21:22)  T(F): , Max: 98.2 (09-28-18 @ 21:22)  HR: 57 (09-29-18 @ 09:18)  BP: 116/51 (09-29-18 @ 09:18)  BP(mean): --  RR: 18 (09-29-18 @ 09:18)  SpO2: 99% (09-29-18 @ 09:18)  Wt(kg): --  I and O's:    09-28 @ 07:01  -  09-29 @ 07:00  --------------------------------------------------------  IN: 620 mL / OUT: 0 mL / NET: 620 mL    REVIEW OF SYSTEMS:  Full ROS done and were negative unless otherwise indicated in HPI/assessment.     PHYSICAL EXAM:  Constitutional: NAD  Respiratory: CTA B/L  Cardiovascular: S1 and S2, RRR  Gastrointestinal: + BS, soft, NT, ND  Extremities: No peripheral edema  Neurological: AAO x 3  : No Goncalves  Access: Not applicable    LABS:                        11.9   9.01  )-----------( 295      ( 28 Sep 2018 09:30 )             36.6     09-29    123<L>  |  86<L>  |  10  ----------------------------<  91  3.6   |  27  |  0.84    Ca    9.5      29 Sep 2018 07:19  Phos  3.6     09-28  Mg     1.8     09-28    TPro  7.4  /  Alb  4.6  /  TBili  0.2  /  DBili  x   /  AST  17  /  ALT  14  /  AlkPhos  47  09-27    Urine Studies:  Osmolality, Random Urine: 425 mos/kg (09-28 @ 20:23)  Sodium, Random Urine: 74 mmol/L (09-28 @ 19:11)  Chloride, Random Urine: 69 mmol/L (09-28 @ 19:11)  Potassium, Random Urine: 42 mmol/L (09-28 @ 19:11)    RADIOLOGY & ADDITIONAL STUDIES:    ASSESSMENT/RECOMMEND  (1)HTN - suboptimal control of late - unclear as to why her BP kaylin so high, so fast. 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 Campos and RN    Shayy Patel NP  [facility]  [phone]

## 2018-09-30 LAB
ANION GAP SERPL CALC-SCNC: 9 MMOL/L — SIGNIFICANT CHANGE UP (ref 5–17)
BUN SERPL-MCNC: 10 MG/DL — SIGNIFICANT CHANGE UP (ref 7–23)
CALCIUM SERPL-MCNC: 9.6 MG/DL — SIGNIFICANT CHANGE UP (ref 8.4–10.5)
CHLORIDE SERPL-SCNC: 92 MMOL/L — LOW (ref 96–108)
CO2 SERPL-SCNC: 29 MMOL/L — SIGNIFICANT CHANGE UP (ref 22–31)
CREAT SERPL-MCNC: 0.95 MG/DL — SIGNIFICANT CHANGE UP (ref 0.5–1.3)
GLUCOSE SERPL-MCNC: 85 MG/DL — SIGNIFICANT CHANGE UP (ref 70–99)
OSMOLALITY SERPL: 260 MOS/KG — LOW (ref 275–300)
POTASSIUM SERPL-MCNC: 4.2 MMOL/L — SIGNIFICANT CHANGE UP (ref 3.5–5.3)
POTASSIUM SERPL-SCNC: 4.2 MMOL/L — SIGNIFICANT CHANGE UP (ref 3.5–5.3)
SODIUM SERPL-SCNC: 130 MMOL/L — LOW (ref 135–145)

## 2018-09-30 RX ADMIN — LOSARTAN POTASSIUM 25 MILLIGRAM(S): 100 TABLET, FILM COATED ORAL at 21:03

## 2018-09-30 RX ADMIN — Medication 50 MILLIGRAM(S): at 05:32

## 2018-09-30 RX ADMIN — PANTOPRAZOLE SODIUM 40 MILLIGRAM(S): 20 TABLET, DELAYED RELEASE ORAL at 05:33

## 2018-09-30 RX ADMIN — SODIUM CHLORIDE 1 GRAM(S): 9 INJECTION INTRAMUSCULAR; INTRAVENOUS; SUBCUTANEOUS at 18:12

## 2018-09-30 RX ADMIN — Medication 50 MILLIGRAM(S): at 18:12

## 2018-09-30 RX ADMIN — SODIUM CHLORIDE 1 GRAM(S): 9 INJECTION INTRAMUSCULAR; INTRAVENOUS; SUBCUTANEOUS at 05:32

## 2018-09-30 RX ADMIN — Medication 3 MILLIGRAM(S): at 05:32

## 2018-09-30 RX ADMIN — Medication 120 MILLIGRAM(S): at 05:32

## 2018-09-30 NOTE — PROGRESS NOTE ADULT - SUBJECTIVE AND OBJECTIVE BOX
CHIEF COMPLAINT: comfortable      PHYSICAL EXAM:  T(C): 36.7 (09-30-18 @ 10:07), Max: 37.2 (09-29-18 @ 16:36)  HR: 60 (09-30-18 @ 10:07) (56 - 65)  BP: 120/79 (09-30-18 @ 10:07) (120/79 - 146/66)  RR: 18 (09-30-18 @ 10:07) (18 - 18)  SpO2: 96% (09-30-18 @ 10:07) (95% - 97%)  Wt(kg): --  I&O's Summary    29 Sep 2018 07:01  -  30 Sep 2018 07:00  --------------------------------------------------------  IN: 300 mL / OUT: 400 mL / NET: -100 mL    30 Sep 2018 07:01  -  30 Sep 2018 13:42  --------------------------------------------------------  IN: 240 mL / OUT: 400 mL / NET: -160 mL        Appearance: Normal	  Cardiovascular: Normal S1 S2, No JVD, No murmurs, No edema  Respiratory: Lungs clear to auscultation	  Psychiatry: A & O x 3, Mood & affect appropriate  Gastrointestinal:  Soft, Non-tender, + BS	  Skin: No rashes, No ecchymoses, No cyanosis	  Neurologic: Non-focal  Extremities: Normal range of motion, No clubbing, cyanosis or edema        	  LABS:	 	    CARDIAC MARKERS:              09-30    130<L>  |  92<L>  |  10  ----------------------------<  85  4.2   |  29  |  0.95    Ca    9.6      30 Sep 2018 07:18      proBNP:   Lipid Profile:   HgA1c:   TSH:

## 2018-09-30 NOTE — PROGRESS NOTE ADULT - SUBJECTIVE AND OBJECTIVE BOX
No pain, no shortness of breath      VITAL:  T(C): , Max: 37.2 (09-29-18 @ 16:36)  T(F): , Max: 99 (09-29-18 @ 16:36)  HR: 60 (09-30-18 @ 05:30)  BP: 135/71 (09-30-18 @ 05:30)  RR: 18 (09-30-18 @ 05:30)  SpO2: 97% (09-30-18 @ 05:30)      PHYSICAL EXAM:  Constitutional: NAD, Alert, pleasant  HEENT: NCAT, MMM  Neck: Supple, No JVD  Respiratory: CTA-b/l  Cardiovascular: RRR s1s2, no m/r/g  Gastrointestinal: BS+, soft, NT/ND  Extremities: No peripheral edema b/l  Neurological: no focal deficits; strength grossly intact  Psychiatric: Normal mood, normal affect  Back: no CVAT b/l  Skin: No rashes, no nevi      LABS:                        11.9   9.01  )-----------( 295      ( 28 Sep 2018 09:30 )             36.6     Na(130)/K(4.2)/Cl(92)/HCO3(29)/BUN(10)/Cr(0.95)Glu(85)/Ca(9.6)/Mg(--)/PO4(--)    09-30 @ 07:18  Na(123)/K(3.6)/Cl(86)/HCO3(27)/BUN(10)/Cr(0.84)Glu(91)/Ca(9.5)/Mg(--)/PO4(--)    09-29 @ 07:19  Na(126)/K(4.0)/Cl(90)/HCO3(25)/BUN(12)/Cr(0.81)Glu(91)/Ca(9.5)/Mg(1.8)/PO4(3.6)    09-28 @ 05:55  Na(127)/K(4.5)/Cl(88)/HCO3(26)/BUN(14)/Cr(0.84)Glu(120)/Ca(9.9)/Mg(--)/PO4(--)    09-27 @ 20:37      Osmolality, Random Urine: 425 mos/kg (09-28 @ 20:23)  Sodium, Random Urine: 74 mmol/L (09-28 @ 19:11)  Chloride, Random Urine: 69 mmol/L (09-28 @ 19:11)  Potassium, Random Urine: 42 mmol/L (09-28 @ 19:11)    IMPRESSION: 85F w/ HTN, GERD, and SIADH, 9/28/18 a/w hypertensive urgency/hyponatremia    (1)HTN - BP well controlled over past 1-2 days, on combination of Cozaar, Verapamil, Lopressor, and Torsemide (which was added for management of SIADH but may ultimately assist in her BP control)    (2)Hyponatremia - SIADH - much improved, off Dyazide, on NaCl tabs, on Torsemide, on free water restriction, and s/p 1.5% NS yesterday      RECOMMEND:  (1)No further 1.5%NS  (2)Salt tabs and Torsemide as ordered  (3)Can liberalize free water restriction a bit (1.2L/24h rather than 1L/24h)  (4)Antihypertensives as ordered  (5)D/C planning per primary team; BMP within 1 week of discharge; could f/u at my office 2-4 weeks post discharge (I will overbook if needed)      Ulices Campos MD  [facility]  [phone]

## 2018-09-30 NOTE — PROGRESS NOTE ADULT - ASSESSMENT
Assessment  1. Hyponatremia - resolved  2. Gastroenteritis  3. HTN  4. ?? Vasculitis    Plan  1. Electrolyte management as per renal  2. Continue current anti-hypertensives  3. If Na remains stable tomorrow, will d/c home

## 2018-10-01 ENCOUNTER — TRANSCRIPTION ENCOUNTER (OUTPATIENT)
Age: 83
End: 2018-10-01

## 2018-10-01 VITALS
OXYGEN SATURATION: 96 % | DIASTOLIC BLOOD PRESSURE: 70 MMHG | RESPIRATION RATE: 18 BRPM | TEMPERATURE: 98 F | HEIGHT: 61 IN | HEART RATE: 57 BPM | SYSTOLIC BLOOD PRESSURE: 142 MMHG

## 2018-10-01 LAB
ANION GAP SERPL CALC-SCNC: 9 MMOL/L — SIGNIFICANT CHANGE UP (ref 5–17)
BUN SERPL-MCNC: 8 MG/DL — SIGNIFICANT CHANGE UP (ref 7–23)
CALCIUM SERPL-MCNC: 9.6 MG/DL — SIGNIFICANT CHANGE UP (ref 8.4–10.5)
CHLORIDE SERPL-SCNC: 95 MMOL/L — LOW (ref 96–108)
CO2 SERPL-SCNC: 28 MMOL/L — SIGNIFICANT CHANGE UP (ref 22–31)
CREAT SERPL-MCNC: 0.9 MG/DL — SIGNIFICANT CHANGE UP (ref 0.5–1.3)
GLUCOSE SERPL-MCNC: 99 MG/DL — SIGNIFICANT CHANGE UP (ref 70–99)
POTASSIUM SERPL-MCNC: 4.7 MMOL/L — SIGNIFICANT CHANGE UP (ref 3.5–5.3)
POTASSIUM SERPL-SCNC: 4.7 MMOL/L — SIGNIFICANT CHANGE UP (ref 3.5–5.3)
SODIUM SERPL-SCNC: 132 MMOL/L — LOW (ref 135–145)

## 2018-10-01 RX ORDER — SODIUM CHLORIDE 9 MG/ML
1 INJECTION INTRAMUSCULAR; INTRAVENOUS; SUBCUTANEOUS
Qty: 60 | Refills: 0 | OUTPATIENT
Start: 2018-10-01 | End: 2018-10-30

## 2018-10-01 RX ORDER — VERAPAMIL HCL 240 MG
240 CAPSULE, EXTENDED RELEASE PELLETS 24 HR ORAL DAILY
Qty: 0 | Refills: 0 | Status: DISCONTINUED | OUTPATIENT
Start: 2018-10-01 | End: 2018-10-01

## 2018-10-01 RX ORDER — TRIAMTERENE/HYDROCHLOROTHIAZID 75 MG-50MG
1 TABLET ORAL
Qty: 0 | Refills: 0 | COMMUNITY

## 2018-10-01 RX ORDER — SODIUM CHLORIDE 9 MG/ML
1 INJECTION INTRAMUSCULAR; INTRAVENOUS; SUBCUTANEOUS
Qty: 60 | Refills: 0
Start: 2018-10-01 | End: 2018-10-30

## 2018-10-01 RX ORDER — VERAPAMIL HCL 240 MG
120 CAPSULE, EXTENDED RELEASE PELLETS 24 HR ORAL ONCE
Qty: 0 | Refills: 0 | Status: COMPLETED | OUTPATIENT
Start: 2018-10-01 | End: 2018-10-01

## 2018-10-01 RX ORDER — LOSARTAN POTASSIUM 100 MG/1
1 TABLET, FILM COATED ORAL
Qty: 30 | Refills: 0
Start: 2018-10-01 | End: 2018-10-30

## 2018-10-01 RX ORDER — LOSARTAN POTASSIUM 100 MG/1
1 TABLET, FILM COATED ORAL
Qty: 30 | Refills: 0 | OUTPATIENT
Start: 2018-10-01 | End: 2018-10-30

## 2018-10-01 RX ADMIN — PANTOPRAZOLE SODIUM 40 MILLIGRAM(S): 20 TABLET, DELAYED RELEASE ORAL at 05:34

## 2018-10-01 RX ADMIN — Medication 120 MILLIGRAM(S): at 11:06

## 2018-10-01 RX ADMIN — Medication 5 MILLIGRAM(S): at 12:06

## 2018-10-01 RX ADMIN — Medication 120 MILLIGRAM(S): at 05:34

## 2018-10-01 RX ADMIN — Medication 50 MILLIGRAM(S): at 05:34

## 2018-10-01 RX ADMIN — Medication 3 MILLIGRAM(S): at 05:34

## 2018-10-01 RX ADMIN — SODIUM CHLORIDE 1 GRAM(S): 9 INJECTION INTRAMUSCULAR; INTRAVENOUS; SUBCUTANEOUS at 05:33

## 2018-10-01 NOTE — DISCHARGE NOTE ADULT - CARE PROVIDER_API CALL
Isaac Figueroa), Cardiovascular Disease; Internal Medicine  [address]  [address]  [place] [address]  Phone: [phone]  Fax: [phone]    Ulices Campos), Internal Medicine; Nephrology  [address]  [place] [address]  Phone: [phone]  Fax: [phone]

## 2018-10-01 NOTE — DISCHARGE NOTE ADULT - MEDICATION SUMMARY - MEDICATIONS TO TAKE
I will START or STAY ON the medications listed below when I get home from the hospital:    predniSONE 1 mg oral tablet  -- 3 tab(s) by mouth once a day  -- Indication: For Steroid    Tylenol 500 mg oral tablet  -- 2 tab(s) by mouth , As Needed  -- Indication: For Analgesia    losartan 25 mg oral tablet  -- 1 tab(s) by mouth once a day (at bedtime)  -- Indication: For Essential hypertension    verapamil 240 mg/24 hours oral capsule, extended release  -- 1 cap(s) by mouth once a day  -- Indication: For Essential hypertension    metoprolol succinate 25 mg oral tablet, extended release  -- 2 tab(s) by mouth 2 times a day  -- Indication: For Essential hypertension    torsemide 5 mg oral tablet  -- 1 tab(s) by mouth every other day   -- Indication: For Hyponatremia    sodium chloride 1 g oral tablet  -- 1 tab(s) by mouth 2 times a day  -- Indication: For Hyponatremia    Gas-X 80 mg oral tablet, chewable  -- as needed  -- Indication: For GI agent    Refresh ophthalmic solution  -- 1 drop(s) to each affected eye , As Needed  -- Indication: For Opthalmic agent    PriLOSEC 20 mg oral delayed release capsule  -- 1 cap(s) by mouth once a day  -- Indication: For PPI    Vitamin D3 1000 intl units oral tablet  -- Indication: For Supplement

## 2018-10-01 NOTE — DISCHARGE NOTE ADULT - PATIENT PORTAL LINK FT
You can access the [facility], offered by [facility], by registering with the following website: [email or web address]

## 2018-10-01 NOTE — DISCHARGE NOTE ADULT - HOSPITAL COURSE
84 y/o F with PMHx of HTN, GERD a/w hypertensive urgency    Hypertensive urgency: pt received total of 75mg metoprolol last night at presentation  Pt seen by her cardiologist Dr. Figueroa   Increase Verapamil to 240 mg qd which patient was on for both frequent PAT and BP.  Follow up with Dr. Figueroa next week     Gastroesophageal reflux disease, esophagitis presence not specified: cont ppi.     Hyponatremia: etiology most likely 2 hctz doses as pt admits to severe hyponatremia with hctz in past  Pt seen by Nephrologist  Placed on Na+    Tachycardia: pt states h/o fast heart beat, confirm with pmd 84 y/o F with PMHx of HTN, GERD a/w hypertensive urgency    Hypertensive urgency: pt received total of 75mg metoprolol last night at presentation  Pt seen by her cardiologist Dr. Figueroa   Increase Verapamil to 240 mg qd which patient was on for both frequent PAT and BP.  Follow up with Dr. Figueroa next week     Gastroesophageal reflux disease, esophagitis presence not specified: cont ppi.     Hyponatremia: etiology most likely 2 hctz doses as pt admits to severe hyponatremia with hctz in past  Pt seen by Nephrologist  Started on Sodium tabs and Torsemide    Pt d/c with ollow up with Dr. Figueroa this week Friday or next week Monday and  Follow up with Dr. Ulices Campos (Nephrologist) Kidney - 2-4 weeks

## 2018-10-01 NOTE — DISCHARGE NOTE ADULT - PLAN OF CARE
Controlled Increase Verapamil to 240 mg qd which patient was on for both frequent PAT and BP.  Follow up with Dr. Figueroa next week SIADH - much improved, off Dyazide, on NaCl tabs, on Torsemide, on free water restriction, and s/p 1.5% NS yesterday  Check BMP within 1 week and follow up with nephrologist (Dr. Ulices Campos) in 2-4 weeks

## 2018-10-01 NOTE — PROGRESS NOTE ADULT - ASSESSMENT
Impression:  BP under adequate control.                       Na+ improving on fluid restriction, salt tabs and now torsemid qod.    Plan:  Re-increase Verapamil to 240 mg qd which patient was on for both frequent PAT and BP.            If Na+ acceptable today, D?C to home with office f/u late this week or one week from now.

## 2018-10-01 NOTE — DISCHARGE NOTE ADULT - ADDITIONAL INSTRUCTIONS
Follow up with Dr. Figueroa this week Friday or next week Monday   Follow up with Dr. Ulices Campos (Nephrologist) Kidney - 2-4 weeks

## 2018-10-01 NOTE — PROGRESS NOTE ADULT - SUBJECTIVE AND OBJECTIVE BOX
Only minimal diarrhea at present.  BP under acceptable control.  Na+ yesterday 130.  TOday's is pending.    Patient admits to much sweating in general and more in suumer as well as increased fluid intake prior to admission.  Diarrhea started only after admission.        REVIEW OF SYSTEMS:  CARDIOVASCULAR: No chest pain, dyspnea or palpitations  All other review of systems is negative unless indicated above    Medications:  heparin  Injectable 5000 Unit(s) SubCutaneous every 12 hours  losartan 25 milliGRAM(s) Oral at bedtime  metoprolol tartrate 50 milliGRAM(s) Oral every 12 hours  pantoprazole    Tablet 40 milliGRAM(s) Oral before breakfast  predniSONE   Tablet 3 milliGRAM(s) Oral daily  sodium chloride 1 Gram(s) Oral two times a day  torsemide 5 milliGRAM(s) Oral <User Schedule>  verapamil  milliGRAM(s) Oral daily      Physical Exam:  Vitals:  T(C): 36.6 (10-01-18 @ 05:31), Max: 37.1 (09-30-18 @ 20:21)  HR: 61 (10-01-18 @ 05:31) (54 - 61)  BP: 123/72 (10-01-18 @ 05:31) (105/65 - 142/61)  BP(mean): --  RR: 18 (10-01-18 @ 05:31) (18 - 18)  SpO2: 96% (10-01-18 @ 05:31) (96% - 97%)  Wt(kg): --  Daily     Daily   I&O's Summary    30 Sep 2018 07:01  -  01 Oct 2018 07:00  --------------------------------------------------------  IN: 660 mL / OUT: 1100 mL / NET: -440 mL        Appearance:  Normal, NAD  Eyes:  EOMI  Neck:  No JVD  Respiratory: Clear to auscultation bilaterally  Cardiovascular: Normal S1 and S2 with faint systolic murmur LSB.  No rubs or gallops  Abdomen:   BS normal, Soft,  Non-tender without organomegaly or masses  Extremities: Without edema      10-01 pending    09-30      130<L>  |  92<L>  |  10  ----------------------------<  85  4.2   |  29  |  0.95    Ca    9.6      30 Sep 2018 07:18

## 2018-10-01 NOTE — DISCHARGE NOTE ADULT - MEDICATION SUMMARY - MEDICATIONS TO STOP TAKING
I will STOP taking the medications listed below when I get home from the hospital:    hydroCHLOROthiazide 12.5 mg oral capsule  -- 1 cap(s) by mouth once a day    metoprolol tartrate 50 mg oral tablet  -- 1 tab(s) by mouth 2 times a day

## 2018-10-01 NOTE — DISCHARGE NOTE ADULT - CARE PLAN
Principal Discharge DX:	Hypertensive urgency  Goal:	Controlled  Assessment and plan of treatment:	Increase Verapamil to 240 mg qd which patient was on for both frequent PAT and BP.  Follow up with Dr. Figueroa next week  Secondary Diagnosis:	Hyponatremia  Assessment and plan of treatment:	SIADH - much improved, off Dyazide, on NaCl tabs, on Torsemide, on free water restriction, and s/p 1.5% NS yesterday  Check BMP within 1 week and follow up with nephrologist (Dr. Ulices Campos) in 2-4 weeks  Secondary Diagnosis:	Gastroesophageal reflux disease, esophagitis presence not specified

## 2018-10-06 LAB
CORTICOSTEROID BINDING GLOBULIN RESULT: 2.6 MG/DL — SIGNIFICANT CHANGE UP
CORTIS F/TOTAL MFR SERPL: 2.8 % — SIGNIFICANT CHANGE UP
CORTIS SERPL-MCNC: 5.4 UG/DL — SIGNIFICANT CHANGE UP
CORTISOL, FREE RESULT: 0.15 UG/DL — LOW

## 2018-11-11 PROCEDURE — 93005 ELECTROCARDIOGRAM TRACING: CPT

## 2018-11-11 PROCEDURE — 83930 ASSAY OF BLOOD OSMOLALITY: CPT

## 2018-11-11 PROCEDURE — 84100 ASSAY OF PHOSPHORUS: CPT

## 2018-11-11 PROCEDURE — 70450 CT HEAD/BRAIN W/O DYE: CPT

## 2018-11-11 PROCEDURE — 85610 PROTHROMBIN TIME: CPT

## 2018-11-11 PROCEDURE — 82436 ASSAY OF URINE CHLORIDE: CPT

## 2018-11-11 PROCEDURE — 84550 ASSAY OF BLOOD/URIC ACID: CPT

## 2018-11-11 PROCEDURE — 82533 TOTAL CORTISOL: CPT

## 2018-11-11 PROCEDURE — 86140 C-REACTIVE PROTEIN: CPT

## 2018-11-11 PROCEDURE — 71046 X-RAY EXAM CHEST 2 VIEWS: CPT

## 2018-11-11 PROCEDURE — 80053 COMPREHEN METABOLIC PANEL: CPT

## 2018-11-11 PROCEDURE — 84443 ASSAY THYROID STIM HORMONE: CPT

## 2018-11-11 PROCEDURE — 85730 THROMBOPLASTIN TIME PARTIAL: CPT

## 2018-11-11 PROCEDURE — 84300 ASSAY OF URINE SODIUM: CPT

## 2018-11-11 PROCEDURE — 99285 EMERGENCY DEPT VISIT HI MDM: CPT

## 2018-11-11 PROCEDURE — 84484 ASSAY OF TROPONIN QUANT: CPT

## 2018-11-11 PROCEDURE — 83735 ASSAY OF MAGNESIUM: CPT

## 2018-11-11 PROCEDURE — 84133 ASSAY OF URINE POTASSIUM: CPT

## 2018-11-11 PROCEDURE — 83935 ASSAY OF URINE OSMOLALITY: CPT

## 2018-11-11 PROCEDURE — 85652 RBC SED RATE AUTOMATED: CPT

## 2018-11-11 PROCEDURE — 85027 COMPLETE CBC AUTOMATED: CPT

## 2018-11-11 PROCEDURE — 80048 BASIC METABOLIC PNL TOTAL CA: CPT

## 2019-02-23 NOTE — H&P ADULT - FAMILY HISTORY
Alert-The patient is alert, awake and responds to voice. The patient is oriented to time, place, and person. The triage nurse is able to obtain subjective information.
Father  Still living? Unknown  Family history of acute myocardial infarction, Age at diagnosis: Age Unknown

## 2019-05-09 PROBLEM — K21.9 GASTRO-ESOPHAGEAL REFLUX DISEASE WITHOUT ESOPHAGITIS: Chronic | Status: ACTIVE | Noted: 2018-09-28

## 2019-05-09 PROBLEM — I10 ESSENTIAL (PRIMARY) HYPERTENSION: Chronic | Status: ACTIVE | Noted: 2018-09-27

## 2019-05-15 ENCOUNTER — APPOINTMENT (OUTPATIENT)
Dept: VASCULAR SURGERY | Facility: CLINIC | Age: 84
End: 2019-05-15
Payer: MEDICARE

## 2019-05-15 PROCEDURE — 93880 EXTRACRANIAL BILAT STUDY: CPT

## 2019-07-19 ENCOUNTER — EMERGENCY (EMERGENCY)
Facility: HOSPITAL | Age: 84
LOS: 1 days | Discharge: HOME | End: 2019-07-19
Attending: EMERGENCY MEDICINE
Payer: MEDICARE

## 2019-07-19 VITALS
OXYGEN SATURATION: 97 % | HEART RATE: 68 BPM | RESPIRATION RATE: 16 BRPM | SYSTOLIC BLOOD PRESSURE: 185 MMHG | DIASTOLIC BLOOD PRESSURE: 75 MMHG | TEMPERATURE: 98 F

## 2019-07-19 VITALS — HEIGHT: 60 IN | WEIGHT: 149.91 LBS

## 2019-07-19 DIAGNOSIS — Z98.49 CATARACT EXTRACTION STATUS, UNSPECIFIED EYE: Chronic | ICD-10-CM

## 2019-07-19 LAB
ALBUMIN SERPL ELPH-MCNC: 3.6 G/DL — SIGNIFICANT CHANGE UP (ref 3.3–5)
ALP SERPL-CCNC: 45 U/L — SIGNIFICANT CHANGE UP (ref 40–120)
ALT FLD-CCNC: 12 U/L — SIGNIFICANT CHANGE UP (ref 10–45)
ANION GAP SERPL CALC-SCNC: 15 MMOL/L — SIGNIFICANT CHANGE UP (ref 5–17)
APPEARANCE UR: CLEAR — SIGNIFICANT CHANGE UP
AST SERPL-CCNC: 24 U/L — SIGNIFICANT CHANGE UP (ref 10–40)
BACTERIA # UR AUTO: NEGATIVE — SIGNIFICANT CHANGE UP
BASE EXCESS BLDV CALC-SCNC: 0.2 MMOL/L — SIGNIFICANT CHANGE UP (ref -2–2)
BASE EXCESS BLDV CALC-SCNC: 1.2 MMOL/L — SIGNIFICANT CHANGE UP (ref -2–2)
BASOPHILS # BLD AUTO: 0 K/UL — SIGNIFICANT CHANGE UP (ref 0–0.2)
BASOPHILS NFR BLD AUTO: 0.3 % — SIGNIFICANT CHANGE UP (ref 0–2)
BILIRUB SERPL-MCNC: 0.2 MG/DL — SIGNIFICANT CHANGE UP (ref 0.2–1.2)
BILIRUB UR-MCNC: NEGATIVE — SIGNIFICANT CHANGE UP
BUN SERPL-MCNC: 11 MG/DL — SIGNIFICANT CHANGE UP (ref 7–23)
CA-I SERPL-SCNC: 1.15 MMOL/L — SIGNIFICANT CHANGE UP (ref 1.12–1.3)
CA-I SERPL-SCNC: 1.16 MMOL/L — SIGNIFICANT CHANGE UP (ref 1.12–1.3)
CALCIUM SERPL-MCNC: 8.5 MG/DL — SIGNIFICANT CHANGE UP (ref 8.4–10.5)
CHLORIDE BLDV-SCNC: 105 MMOL/L — SIGNIFICANT CHANGE UP (ref 96–108)
CHLORIDE BLDV-SCNC: 97 MMOL/L — SIGNIFICANT CHANGE UP (ref 96–108)
CHLORIDE SERPL-SCNC: 94 MMOL/L — LOW (ref 96–108)
CO2 BLDV-SCNC: 28 MMOL/L — SIGNIFICANT CHANGE UP (ref 22–30)
CO2 BLDV-SCNC: 29 MMOL/L — SIGNIFICANT CHANGE UP (ref 22–30)
CO2 SERPL-SCNC: 22 MMOL/L — SIGNIFICANT CHANGE UP (ref 22–31)
COLOR SPEC: COLORLESS — SIGNIFICANT CHANGE UP
CREAT SERPL-MCNC: 0.83 MG/DL — SIGNIFICANT CHANGE UP (ref 0.5–1.3)
DIFF PNL FLD: NEGATIVE — SIGNIFICANT CHANGE UP
EOSINOPHIL # BLD AUTO: 0.1 K/UL — SIGNIFICANT CHANGE UP (ref 0–0.5)
EOSINOPHIL NFR BLD AUTO: 1.2 % — SIGNIFICANT CHANGE UP (ref 0–6)
EPI CELLS # UR: 0 /HPF — SIGNIFICANT CHANGE UP
GAS PNL BLDV: 129 MMOL/L — LOW (ref 135–145)
GAS PNL BLDV: 135 MMOL/L — SIGNIFICANT CHANGE UP (ref 135–145)
GAS PNL BLDV: SIGNIFICANT CHANGE UP
GLUCOSE BLDV-MCNC: 136 MG/DL — HIGH (ref 70–99)
GLUCOSE BLDV-MCNC: 96 MG/DL — SIGNIFICANT CHANGE UP (ref 70–99)
GLUCOSE SERPL-MCNC: 139 MG/DL — HIGH (ref 70–99)
GLUCOSE UR QL: NEGATIVE — SIGNIFICANT CHANGE UP
HCO3 BLDV-SCNC: 27 MMOL/L — SIGNIFICANT CHANGE UP (ref 21–29)
HCO3 BLDV-SCNC: 27 MMOL/L — SIGNIFICANT CHANGE UP (ref 21–29)
HCT VFR BLD CALC: 35.4 % — SIGNIFICANT CHANGE UP (ref 34.5–45)
HCT VFR BLDA CALC: 33 % — LOW (ref 39–50)
HCT VFR BLDA CALC: 36 % — LOW (ref 39–50)
HGB BLD CALC-MCNC: 10.6 G/DL — LOW (ref 11.5–15.5)
HGB BLD CALC-MCNC: 11.8 G/DL — SIGNIFICANT CHANGE UP (ref 11.5–15.5)
HGB BLD-MCNC: 11.8 G/DL — SIGNIFICANT CHANGE UP (ref 11.5–15.5)
HYALINE CASTS # UR AUTO: 1 /LPF — SIGNIFICANT CHANGE UP (ref 0–2)
KETONES UR-MCNC: NEGATIVE — SIGNIFICANT CHANGE UP
LACTATE BLDV-MCNC: 1.3 MMOL/L — SIGNIFICANT CHANGE UP (ref 0.7–2)
LACTATE BLDV-MCNC: 3.1 MMOL/L — HIGH (ref 0.7–2)
LEUKOCYTE ESTERASE UR-ACNC: ABNORMAL
LIDOCAIN IGE QN: 49 U/L — SIGNIFICANT CHANGE UP (ref 7–60)
LYMPHOCYTES # BLD AUTO: 1.5 K/UL — SIGNIFICANT CHANGE UP (ref 1–3.3)
LYMPHOCYTES # BLD AUTO: 14.6 % — SIGNIFICANT CHANGE UP (ref 13–44)
MCHC RBC-ENTMCNC: 29.3 PG — SIGNIFICANT CHANGE UP (ref 27–34)
MCHC RBC-ENTMCNC: 33.4 GM/DL — SIGNIFICANT CHANGE UP (ref 32–36)
MCV RBC AUTO: 87.8 FL — SIGNIFICANT CHANGE UP (ref 80–100)
MONOCYTES # BLD AUTO: 0.8 K/UL — SIGNIFICANT CHANGE UP (ref 0–0.9)
MONOCYTES NFR BLD AUTO: 8 % — SIGNIFICANT CHANGE UP (ref 2–14)
NEUTROPHILS # BLD AUTO: 8 K/UL — HIGH (ref 1.8–7.4)
NEUTROPHILS NFR BLD AUTO: 76 % — SIGNIFICANT CHANGE UP (ref 43–77)
NITRITE UR-MCNC: NEGATIVE — SIGNIFICANT CHANGE UP
OTHER CELLS CSF MANUAL: 8 ML/DL — LOW (ref 18–22)
PCO2 BLDV: 50 MMHG — SIGNIFICANT CHANGE UP (ref 35–50)
PCO2 BLDV: 60 MMHG — HIGH (ref 35–50)
PH BLDV: 7.28 — LOW (ref 7.35–7.45)
PH BLDV: 7.34 — LOW (ref 7.35–7.45)
PH UR: 6.5 — SIGNIFICANT CHANGE UP (ref 5–8)
PLATELET # BLD AUTO: 240 K/UL — SIGNIFICANT CHANGE UP (ref 150–400)
PO2 BLDV: 27 MMHG — SIGNIFICANT CHANGE UP (ref 25–45)
PO2 BLDV: 32 MMHG — SIGNIFICANT CHANGE UP (ref 25–45)
POTASSIUM BLDV-SCNC: 4.1 MMOL/L — SIGNIFICANT CHANGE UP (ref 3.5–5.3)
POTASSIUM BLDV-SCNC: 4.6 MMOL/L — SIGNIFICANT CHANGE UP (ref 3.5–5.3)
POTASSIUM SERPL-MCNC: 4.4 MMOL/L — SIGNIFICANT CHANGE UP (ref 3.5–5.3)
POTASSIUM SERPL-SCNC: 4.4 MMOL/L — SIGNIFICANT CHANGE UP (ref 3.5–5.3)
PROT SERPL-MCNC: 6.4 G/DL — SIGNIFICANT CHANGE UP (ref 6–8.3)
PROT UR-MCNC: NEGATIVE — SIGNIFICANT CHANGE UP
RBC # BLD: 4.04 M/UL — SIGNIFICANT CHANGE UP (ref 3.8–5.2)
RBC # FLD: 12.3 % — SIGNIFICANT CHANGE UP (ref 10.3–14.5)
RBC CASTS # UR COMP ASSIST: 2 /HPF — SIGNIFICANT CHANGE UP (ref 0–4)
SAO2 % BLDV: 37 % — LOW (ref 67–88)
SAO2 % BLDV: 54 % — LOW (ref 67–88)
SODIUM SERPL-SCNC: 131 MMOL/L — LOW (ref 135–145)
SP GR SPEC: 1.01 — LOW (ref 1.01–1.02)
UROBILINOGEN FLD QL: NEGATIVE — SIGNIFICANT CHANGE UP
WBC # BLD: 10.6 K/UL — HIGH (ref 3.8–10.5)
WBC # FLD AUTO: 10.6 K/UL — HIGH (ref 3.8–10.5)
WBC UR QL: 5 /HPF — SIGNIFICANT CHANGE UP (ref 0–5)

## 2019-07-19 PROCEDURE — 99284 EMERGENCY DEPT VISIT MOD MDM: CPT | Mod: GC

## 2019-07-19 PROCEDURE — 85027 COMPLETE CBC AUTOMATED: CPT

## 2019-07-19 PROCEDURE — 93005 ELECTROCARDIOGRAM TRACING: CPT

## 2019-07-19 PROCEDURE — 82435 ASSAY OF BLOOD CHLORIDE: CPT

## 2019-07-19 PROCEDURE — 83690 ASSAY OF LIPASE: CPT

## 2019-07-19 PROCEDURE — 82803 BLOOD GASES ANY COMBINATION: CPT

## 2019-07-19 PROCEDURE — 82330 ASSAY OF CALCIUM: CPT

## 2019-07-19 PROCEDURE — 85014 HEMATOCRIT: CPT

## 2019-07-19 PROCEDURE — 93010 ELECTROCARDIOGRAM REPORT: CPT

## 2019-07-19 PROCEDURE — 74177 CT ABD & PELVIS W/CONTRAST: CPT | Mod: 26

## 2019-07-19 PROCEDURE — 74177 CT ABD & PELVIS W/CONTRAST: CPT

## 2019-07-19 PROCEDURE — 99284 EMERGENCY DEPT VISIT MOD MDM: CPT

## 2019-07-19 PROCEDURE — 84295 ASSAY OF SERUM SODIUM: CPT

## 2019-07-19 PROCEDURE — 83605 ASSAY OF LACTIC ACID: CPT

## 2019-07-19 PROCEDURE — 84132 ASSAY OF SERUM POTASSIUM: CPT

## 2019-07-19 PROCEDURE — 82962 GLUCOSE BLOOD TEST: CPT

## 2019-07-19 PROCEDURE — 81001 URINALYSIS AUTO W/SCOPE: CPT

## 2019-07-19 PROCEDURE — 80053 COMPREHEN METABOLIC PANEL: CPT

## 2019-07-19 PROCEDURE — 82947 ASSAY GLUCOSE BLOOD QUANT: CPT

## 2019-07-19 RX ORDER — SODIUM CHLORIDE 9 MG/ML
1000 INJECTION, SOLUTION INTRAVENOUS ONCE
Refills: 0 | Status: COMPLETED | OUTPATIENT
Start: 2019-07-19 | End: 2019-07-19

## 2019-07-19 RX ORDER — SODIUM CHLORIDE 9 MG/ML
1000 INJECTION INTRAMUSCULAR; INTRAVENOUS; SUBCUTANEOUS ONCE
Refills: 0 | Status: COMPLETED | OUTPATIENT
Start: 2019-07-19 | End: 2019-07-19

## 2019-07-19 RX ADMIN — SODIUM CHLORIDE 1000 MILLILITER(S): 9 INJECTION, SOLUTION INTRAVENOUS at 18:15

## 2019-07-19 RX ADMIN — SODIUM CHLORIDE 1000 MILLILITER(S): 9 INJECTION INTRAMUSCULAR; INTRAVENOUS; SUBCUTANEOUS at 16:22

## 2019-07-19 NOTE — ED ADULT NURSE REASSESSMENT NOTE - NS ED NURSE REASSESS COMMENT FT1
pt tolerated po challenge; dr chan made aware of sbp 185; pt states her antihypertensive meds are due; pt is asymptomatic

## 2019-07-19 NOTE — ED ADULT NURSE NOTE - CHPI ED NUR SYMPTOMS NEG
no fever/no abdominal distension/no hematuria/no burning urination/no blood in stool/no vomiting/no nausea/no chills/no dysuria

## 2019-07-19 NOTE — ED ADULT NURSE REASSESSMENT NOTE - NS ED NURSE REASSESS COMMENT FT1
resting in bed, appears comfortable; on phone; family at bedside; ivf infusing; repeat labs pending.

## 2019-07-19 NOTE — ED ADULT NURSE NOTE - NSIMPLEMENTINTERV_GEN_ALL_ED
Implemented All Universal Safety Interventions:  [place] to call system. Call bell, personal items and telephone within reach. Instruct patient to call for assistance. Room bathroom lighting operational. Non-slip footwear when patient is off stretcher. Physically safe environment: no spills, clutter or unnecessary equipment. Stretcher in lowest position, wheels locked, appropriate side rails in place.

## 2019-07-19 NOTE — ED PROVIDER NOTE - OBJECTIVE STATEMENT
86F hx Polymyalgia rheumatica, htn p/w abd pain. Patient reports sudden onset LLQ pain radiating to RLQ. Endorses nausea and one episode of nb diarrhea with partial resolution of symptoms. Had [place] chicken and cranberries for lunch. Denies urinary symptoms, vaginal discharge, cp, sob, back pain. Of note, patient is being worked up outpatient for "irregular heart beat" currently wearing a halter monitor.

## 2019-07-19 NOTE — ED PROVIDER NOTE - ATTENDING CONTRIBUTION TO CARE
attending Georgetteack: 86yF h/o PMR, HTN p/w LLQ pain radiating to RLQ x several hours. +nausea with one episode of watery diarrhea. Well appearing with dry MM on exam, abdomen soft with mild b/l LQ tenderness. Will obtain labs, urine, CT A/P, IVF, reassess.

## 2019-07-19 NOTE — ED ADULT NURSE NOTE - OBJECTIVE STATEMENT
87 y/o female hx polymyalgia rheumatica, HTN presents to the ED via EMS from home c/o abd pain and near syncope this afternoon. Pt states after eating had sudden onset of LLQ radiating to RLQ, associated with nausea, diarrhea and near syncope, pt states "I got afraid so I hit my life alert."  Pt states recently had holter monitor in place for "skipped beats." Denies fever, chills, n/v, weakness, constipation, numbness/tingling, urinary s/s, CP. Pt A&Ox3, in no respiratory distress, no CP, neuro intact, abd soft, nontender nondistended, lungs CTA, pt states improved symptoms. PT safety maintained with family at bedside, call bell within reach and bed in the lowest position.

## 2019-07-19 NOTE — ED PROVIDER NOTE - NSFOLLOWUPINSTRUCTIONS_ED_ALL_ED_FT
You were seen today in the emergency room for abdominal pain. Although the testing done today indicates that your pain is not from an acute emergency, your pain could still represent a more serious problem. Most commonly, the pain you are having is likely not something serious and will resolve in a few days, however testing was done today based on the symptoms that you currently have; so if you develop new or worsening symptoms this could be a sign of a problem that was not tested for and means you should come back to the emergency room or see your doctor urgently. You need to follow up with your doctor in the next 48-72 hours. If you develop any new or worsening symptoms you need to return immediately to the emergency department. If you experience any of the following please come right back to the emergency room: severe nausea and vomiting with inability to tolerate eating, severe worsening of your pain, a new fever, new bleeding in stool or vomit, confusion, new numbness or weakness, passing out.

## 2019-09-25 ENCOUNTER — INPATIENT (INPATIENT)
Facility: HOSPITAL | Age: 84
LOS: 0 days | Discharge: HOME | DRG: 392 | End: 2019-09-26
Attending: INTERNAL MEDICINE | Admitting: INTERNAL MEDICINE
Payer: COMMERCIAL

## 2019-09-25 VITALS
SYSTOLIC BLOOD PRESSURE: 180 MMHG | HEART RATE: 71 BPM | RESPIRATION RATE: 20 BRPM | OXYGEN SATURATION: 96 % | DIASTOLIC BLOOD PRESSURE: 72 MMHG | TEMPERATURE: 98 F | HEIGHT: 60 IN | WEIGHT: 149.91 LBS

## 2019-09-25 DIAGNOSIS — K52.9 NONINFECTIVE GASTROENTERITIS AND COLITIS, UNSPECIFIED: ICD-10-CM

## 2019-09-25 DIAGNOSIS — K21.9 GASTRO-ESOPHAGEAL REFLUX DISEASE WITHOUT ESOPHAGITIS: ICD-10-CM

## 2019-09-25 DIAGNOSIS — Z29.9 ENCOUNTER FOR PROPHYLACTIC MEASURES, UNSPECIFIED: ICD-10-CM

## 2019-09-25 DIAGNOSIS — M35.3 POLYMYALGIA RHEUMATICA: ICD-10-CM

## 2019-09-25 DIAGNOSIS — E87.1 HYPO-OSMOLALITY AND HYPONATREMIA: ICD-10-CM

## 2019-09-25 DIAGNOSIS — Z98.49 CATARACT EXTRACTION STATUS, UNSPECIFIED EYE: Chronic | ICD-10-CM

## 2019-09-25 DIAGNOSIS — I10 ESSENTIAL (PRIMARY) HYPERTENSION: ICD-10-CM

## 2019-09-25 LAB
ALBUMIN SERPL ELPH-MCNC: 3.9 G/DL — SIGNIFICANT CHANGE UP (ref 3.3–5)
ALP SERPL-CCNC: 48 U/L — SIGNIFICANT CHANGE UP (ref 40–120)
ALT FLD-CCNC: 12 U/L — SIGNIFICANT CHANGE UP (ref 10–45)
ANION GAP SERPL CALC-SCNC: 10 MMOL/L — SIGNIFICANT CHANGE UP (ref 5–17)
ANION GAP SERPL CALC-SCNC: 13 MMOL/L — SIGNIFICANT CHANGE UP (ref 5–17)
APPEARANCE UR: CLEAR — SIGNIFICANT CHANGE UP
APTT BLD: 27.3 SEC — LOW (ref 27.5–36.3)
AST SERPL-CCNC: 19 U/L — SIGNIFICANT CHANGE UP (ref 10–40)
BACTERIA # UR AUTO: NEGATIVE — SIGNIFICANT CHANGE UP
BASOPHILS # BLD AUTO: 0 K/UL — SIGNIFICANT CHANGE UP (ref 0–0.2)
BASOPHILS NFR BLD AUTO: 0.1 % — SIGNIFICANT CHANGE UP (ref 0–2)
BILIRUB SERPL-MCNC: 0.5 MG/DL — SIGNIFICANT CHANGE UP (ref 0.2–1.2)
BILIRUB UR-MCNC: NEGATIVE — SIGNIFICANT CHANGE UP
BUN SERPL-MCNC: 7 MG/DL — SIGNIFICANT CHANGE UP (ref 7–23)
BUN SERPL-MCNC: 9 MG/DL — SIGNIFICANT CHANGE UP (ref 7–23)
CALCIUM SERPL-MCNC: 9 MG/DL — SIGNIFICANT CHANGE UP (ref 8.4–10.5)
CALCIUM SERPL-MCNC: 9.2 MG/DL — SIGNIFICANT CHANGE UP (ref 8.4–10.5)
CHLORIDE SERPL-SCNC: 89 MMOL/L — LOW (ref 96–108)
CHLORIDE SERPL-SCNC: 95 MMOL/L — LOW (ref 96–108)
CO2 SERPL-SCNC: 26 MMOL/L — SIGNIFICANT CHANGE UP (ref 22–31)
CO2 SERPL-SCNC: 29 MMOL/L — SIGNIFICANT CHANGE UP (ref 22–31)
COLOR SPEC: COLORLESS — SIGNIFICANT CHANGE UP
CREAT SERPL-MCNC: 0.79 MG/DL — SIGNIFICANT CHANGE UP (ref 0.5–1.3)
CREAT SERPL-MCNC: 0.81 MG/DL — SIGNIFICANT CHANGE UP (ref 0.5–1.3)
DIFF PNL FLD: NEGATIVE — SIGNIFICANT CHANGE UP
EOSINOPHIL # BLD AUTO: 0 K/UL — SIGNIFICANT CHANGE UP (ref 0–0.5)
EOSINOPHIL NFR BLD AUTO: 0.2 % — SIGNIFICANT CHANGE UP (ref 0–6)
EPI CELLS # UR: 0 /HPF — SIGNIFICANT CHANGE UP
GAS PNL BLDV: SIGNIFICANT CHANGE UP
GLUCOSE SERPL-MCNC: 142 MG/DL — HIGH (ref 70–99)
GLUCOSE SERPL-MCNC: 89 MG/DL — SIGNIFICANT CHANGE UP (ref 70–99)
GLUCOSE UR QL: NEGATIVE — SIGNIFICANT CHANGE UP
HCT VFR BLD CALC: 36.3 % — SIGNIFICANT CHANGE UP (ref 34.5–45)
HCT VFR BLD CALC: 36.3 % — SIGNIFICANT CHANGE UP (ref 34.5–45)
HGB BLD-MCNC: 11.9 G/DL — SIGNIFICANT CHANGE UP (ref 11.5–15.5)
HGB BLD-MCNC: 11.9 G/DL — SIGNIFICANT CHANGE UP (ref 11.5–15.5)
HYALINE CASTS # UR AUTO: 1 /LPF — SIGNIFICANT CHANGE UP (ref 0–2)
INR BLD: 1.05 RATIO — SIGNIFICANT CHANGE UP (ref 0.88–1.16)
KETONES UR-MCNC: NEGATIVE — SIGNIFICANT CHANGE UP
LEUKOCYTE ESTERASE UR-ACNC: NEGATIVE — SIGNIFICANT CHANGE UP
LIDOCAIN IGE QN: 25 U/L — SIGNIFICANT CHANGE UP (ref 7–60)
LYMPHOCYTES # BLD AUTO: 1.4 K/UL — SIGNIFICANT CHANGE UP (ref 1–3.3)
LYMPHOCYTES # BLD AUTO: 9.4 % — LOW (ref 13–44)
MAGNESIUM SERPL-MCNC: 1.7 MG/DL — SIGNIFICANT CHANGE UP (ref 1.6–2.6)
MCHC RBC-ENTMCNC: 28.6 PG — SIGNIFICANT CHANGE UP (ref 27–34)
MCHC RBC-ENTMCNC: 28.9 PG — SIGNIFICANT CHANGE UP (ref 27–34)
MCHC RBC-ENTMCNC: 32.8 GM/DL — SIGNIFICANT CHANGE UP (ref 32–36)
MCHC RBC-ENTMCNC: 32.9 GM/DL — SIGNIFICANT CHANGE UP (ref 32–36)
MCV RBC AUTO: 87.1 FL — SIGNIFICANT CHANGE UP (ref 80–100)
MCV RBC AUTO: 87.7 FL — SIGNIFICANT CHANGE UP (ref 80–100)
MONOCYTES # BLD AUTO: 1 K/UL — HIGH (ref 0–0.9)
MONOCYTES NFR BLD AUTO: 6.4 % — SIGNIFICANT CHANGE UP (ref 2–14)
NEUTROPHILS # BLD AUTO: 12.9 K/UL — HIGH (ref 1.8–7.4)
NEUTROPHILS NFR BLD AUTO: 83.9 % — HIGH (ref 43–77)
NITRITE UR-MCNC: NEGATIVE — SIGNIFICANT CHANGE UP
PH UR: 7 — SIGNIFICANT CHANGE UP (ref 5–8)
PHOSPHATE SERPL-MCNC: 3.2 MG/DL — SIGNIFICANT CHANGE UP (ref 2.5–4.5)
PLATELET # BLD AUTO: 267 K/UL — SIGNIFICANT CHANGE UP (ref 150–400)
PLATELET # BLD AUTO: 269 K/UL — SIGNIFICANT CHANGE UP (ref 150–400)
POTASSIUM SERPL-MCNC: 3.3 MMOL/L — LOW (ref 3.5–5.3)
POTASSIUM SERPL-MCNC: 3.7 MMOL/L — SIGNIFICANT CHANGE UP (ref 3.5–5.3)
POTASSIUM SERPL-SCNC: 3.3 MMOL/L — LOW (ref 3.5–5.3)
POTASSIUM SERPL-SCNC: 3.7 MMOL/L — SIGNIFICANT CHANGE UP (ref 3.5–5.3)
PROT SERPL-MCNC: 6.5 G/DL — SIGNIFICANT CHANGE UP (ref 6–8.3)
PROT UR-MCNC: NEGATIVE — SIGNIFICANT CHANGE UP
PROTHROM AB SERPL-ACNC: 12.1 SEC — SIGNIFICANT CHANGE UP (ref 10–12.9)
RBC # BLD: 4.14 M/UL — SIGNIFICANT CHANGE UP (ref 3.8–5.2)
RBC # BLD: 4.16 M/UL — SIGNIFICANT CHANGE UP (ref 3.8–5.2)
RBC # FLD: 12 % — SIGNIFICANT CHANGE UP (ref 10.3–14.5)
RBC # FLD: 12.1 % — SIGNIFICANT CHANGE UP (ref 10.3–14.5)
SODIUM SERPL-SCNC: 128 MMOL/L — LOW (ref 135–145)
SODIUM SERPL-SCNC: 134 MMOL/L — LOW (ref 135–145)
SP GR SPEC: 1.02 — SIGNIFICANT CHANGE UP (ref 1.01–1.02)
UROBILINOGEN FLD QL: NEGATIVE — SIGNIFICANT CHANGE UP
WBC # BLD: 13.7 K/UL — HIGH (ref 3.8–10.5)
WBC # BLD: 15.4 K/UL — HIGH (ref 3.8–10.5)
WBC # FLD AUTO: 13.7 K/UL — HIGH (ref 3.8–10.5)
WBC # FLD AUTO: 15.4 K/UL — HIGH (ref 3.8–10.5)
WBC UR QL: 0 /HPF — SIGNIFICANT CHANGE UP (ref 0–5)

## 2019-09-25 PROCEDURE — 99285 EMERGENCY DEPT VISIT HI MDM: CPT | Mod: GC

## 2019-09-25 PROCEDURE — 99223 1ST HOSP IP/OBS HIGH 75: CPT

## 2019-09-25 PROCEDURE — 74177 CT ABD & PELVIS W/CONTRAST: CPT | Mod: 26

## 2019-09-25 RX ORDER — METOPROLOL TARTRATE 50 MG
50 TABLET ORAL ONCE
Refills: 0 | Status: COMPLETED | OUTPATIENT
Start: 2019-09-25 | End: 2019-09-25

## 2019-09-25 RX ORDER — POTASSIUM CHLORIDE 20 MEQ
40 PACKET (EA) ORAL ONCE
Refills: 0 | Status: COMPLETED | OUTPATIENT
Start: 2019-09-25 | End: 2019-09-25

## 2019-09-25 RX ORDER — SODIUM CHLORIDE 9 MG/ML
1 INJECTION INTRAMUSCULAR; INTRAVENOUS; SUBCUTANEOUS DAILY
Refills: 0 | Status: DISCONTINUED | OUTPATIENT
Start: 2019-09-25 | End: 2019-09-26

## 2019-09-25 RX ORDER — METOPROLOL TARTRATE 50 MG
50 TABLET ORAL DAILY
Refills: 0 | Status: DISCONTINUED | OUTPATIENT
Start: 2019-09-25 | End: 2019-09-26

## 2019-09-25 RX ORDER — ENOXAPARIN SODIUM 100 MG/ML
40 INJECTION SUBCUTANEOUS DAILY
Refills: 0 | Status: DISCONTINUED | OUTPATIENT
Start: 2019-09-25 | End: 2019-09-26

## 2019-09-25 RX ORDER — PANTOPRAZOLE SODIUM 20 MG/1
40 TABLET, DELAYED RELEASE ORAL
Refills: 0 | Status: DISCONTINUED | OUTPATIENT
Start: 2019-09-25 | End: 2019-09-26

## 2019-09-25 RX ORDER — SODIUM CHLORIDE 9 MG/ML
500 INJECTION, SOLUTION INTRAVENOUS ONCE
Refills: 0 | Status: COMPLETED | OUTPATIENT
Start: 2019-09-25 | End: 2019-09-25

## 2019-09-25 RX ORDER — METRONIDAZOLE 500 MG
500 TABLET ORAL EVERY 8 HOURS
Refills: 0 | Status: DISCONTINUED | OUTPATIENT
Start: 2019-09-25 | End: 2019-09-25

## 2019-09-25 RX ORDER — CIPROFLOXACIN LACTATE 400MG/40ML
500 VIAL (ML) INTRAVENOUS EVERY 12 HOURS
Refills: 0 | Status: DISCONTINUED | OUTPATIENT
Start: 2019-09-25 | End: 2019-09-26

## 2019-09-25 RX ORDER — VERAPAMIL HCL 240 MG
240 CAPSULE, EXTENDED RELEASE PELLETS 24 HR ORAL DAILY
Refills: 0 | Status: DISCONTINUED | OUTPATIENT
Start: 2019-09-25 | End: 2019-09-26

## 2019-09-25 RX ORDER — LOSARTAN POTASSIUM 100 MG/1
100 TABLET, FILM COATED ORAL DAILY
Refills: 0 | Status: DISCONTINUED | OUTPATIENT
Start: 2019-09-25 | End: 2019-09-26

## 2019-09-25 RX ORDER — ONDANSETRON 8 MG/1
4 TABLET, FILM COATED ORAL ONCE
Refills: 0 | Status: COMPLETED | OUTPATIENT
Start: 2019-09-25 | End: 2019-09-25

## 2019-09-25 RX ORDER — CIPROFLOXACIN LACTATE 400MG/40ML
400 VIAL (ML) INTRAVENOUS ONCE
Refills: 0 | Status: COMPLETED | OUTPATIENT
Start: 2019-09-25 | End: 2019-09-25

## 2019-09-25 RX ORDER — ACETAMINOPHEN 500 MG
650 TABLET ORAL ONCE
Refills: 0 | Status: COMPLETED | OUTPATIENT
Start: 2019-09-25 | End: 2019-09-25

## 2019-09-25 RX ADMIN — SODIUM CHLORIDE 500 MILLILITER(S): 9 INJECTION, SOLUTION INTRAVENOUS at 06:26

## 2019-09-25 RX ADMIN — SODIUM CHLORIDE 500 MILLILITER(S): 9 INJECTION, SOLUTION INTRAVENOUS at 03:29

## 2019-09-25 RX ADMIN — Medication 3 MILLIGRAM(S): at 13:05

## 2019-09-25 RX ADMIN — Medication 500 MILLIGRAM(S): at 17:30

## 2019-09-25 RX ADMIN — Medication 50 MILLIGRAM(S): at 05:24

## 2019-09-25 RX ADMIN — ONDANSETRON 4 MILLIGRAM(S): 8 TABLET, FILM COATED ORAL at 03:29

## 2019-09-25 RX ADMIN — Medication 240 MILLIGRAM(S): at 17:40

## 2019-09-25 RX ADMIN — ENOXAPARIN SODIUM 40 MILLIGRAM(S): 100 INJECTION SUBCUTANEOUS at 11:17

## 2019-09-25 RX ADMIN — PANTOPRAZOLE SODIUM 40 MILLIGRAM(S): 20 TABLET, DELAYED RELEASE ORAL at 11:21

## 2019-09-25 RX ADMIN — Medication 40 MILLIEQUIVALENT(S): at 05:23

## 2019-09-25 RX ADMIN — Medication 650 MILLIGRAM(S): at 12:14

## 2019-09-25 RX ADMIN — Medication 200 MILLIGRAM(S): at 06:27

## 2019-09-25 RX ADMIN — SODIUM CHLORIDE 1 GRAM(S): 9 INJECTION INTRAMUSCULAR; INTRAVENOUS; SUBCUTANEOUS at 11:15

## 2019-09-25 RX ADMIN — LOSARTAN POTASSIUM 100 MILLIGRAM(S): 100 TABLET, FILM COATED ORAL at 11:17

## 2019-09-25 RX ADMIN — Medication 650 MILLIGRAM(S): at 11:15

## 2019-09-25 NOTE — CONSULT NOTE ADULT - SUBJECTIVE AND OBJECTIVE BOX
Full consult to follow up  For now keep on liquids, IV Cipro/Flagyl and send GI PCR panel Full consult to follow  For now keep on liquids, IV Cipro/Flagyl and send GI PCR panel  _______________________________________________________________________________________  SUBJECTIVE:  86yFemale presents with diarrhea    Felt great yesterday before lunch  After lunch (about 20-30 minutes) developed cramping bilateral lower abdominal pain followed by multiple urgent loose stools, sweating and dizziness  She had a similar episode in July - came to ED, had normal CT scan and was sent home  This time she tried to "tough it out" but felt progressively weaker and eventually elected to come to ER  Since this morning she feels better - no further bowel movements or nausea  still has abdominal tenderness but "better"  she denies recent weight loss, fever, chills, travel or antibiotic use  she did not see blood or melena in the stool  there are no known sick contacts  her last colonoscopy was around 10 years ago and reportedly no colitis or polyps were seen    ______________________________________________________________________  PMH/PSH:  PAST MEDICAL & SURGICAL HISTORY:  Hypertension  Gastroesophageal reflux disease, esophagitis presence not specified  Hypertension  History of cataract extraction, unspecified laterality  hx of diverticulitis  OA  PMR on steroids    ______________________________________________________________________  MEDS:  MEDICATIONS  (STANDING):  ciprofloxacin     Tablet 500 milliGRAM(s) Oral every 12 hours  enoxaparin Injectable 40 milliGRAM(s) SubCutaneous daily  losartan 100 milliGRAM(s) Oral daily  metoprolol succinate ER 50 milliGRAM(s) Oral daily  pantoprazole    Tablet 40 milliGRAM(s) Oral before breakfast  predniSONE   Tablet 3 milliGRAM(s) Oral daily  sodium chloride 1 Gram(s) Oral daily  verapamil  milliGRAM(s) Oral daily    MEDICATIONS  (PRN):    ______________________________________________________________________  ALL:   Allergies    penicillins (Hives)    Intolerances      ______________________________________________________________________  SH: no active toxic habits  ______________________________________________________________________  FH:  FAMILY HISTORY:  Family history of acute myocardial infarction  negative hx of ibd/cancer  ______________________________________________________________________  ROS:    CONSTITUTIONAL:  No weight loss, fever, chills, weakness or fatigue.    HEENT:  Eyes:  No visual loss, blurred vision, double vision or yellow sclerae. Ears, Nose, Throat:  No hearing loss, sneezing, congestion, runny nose or sore throat.    SKIN:  No rash or itching.    CARDIOVASCULAR:  No chest pain, chest pressure or chest discomfort. No palpitations or edema.    RESPIRATORY:  No shortness of breath, cough or sputum.    GASTROINTESTINAL:  SEE HPI    GENITOURINARY:  No dysuria, hematuria, urinary frequency    NEUROLOGICAL:  No headache, dizziness, syncope, paralysis, ataxia, numbness or tingling in the extremities. No change in bowel or bladder control.    MUSCULOSKELETAL:  No muscle, back pain, joint pain or stiffness.    HEMATOLOGIC:  No anemia, bleeding or bruising.    LYMPHATICS:  No enlarged nodes. No history of splenectomy.    PSYCHIATRIC:  No history of depression or anxiety.    ENDOCRINOLOGIC:  No reports of sweating, cold or heat intolerance. No polyuria or polydipsia.    ALLERGIES:  No history of asthma, hives, eczema or rhinitis.  ______________________________________________________________________  PHYSICAL EXAM:  T(C): 36.4 (09-25-19 @ 15:26), Max: 36.7 (09-25-19 @ 02:39)  HR: 53 (09-25-19 @ 15:26)  BP: 169/61 (09-25-19 @ 15:26)  RR: 18 (09-25-19 @ 15:26)  SpO2: 96% (09-25-19 @ 15:26)  Wt(kg): --    PHYSICAL EXAM:    limited exam - patient in ER hallway, no rooms available    Constitutional: nad, able to ambulate to bathroom without assistance    Eyes: perrla eomi    ENMT: mmm    Neck: no lad/jvd    Respiratory: cta    Cardiovascular: rr s1/s2 normal    Gastrointestinal: soft mild rlq/llq ttp +bs no r/g/hsm    Rectal: deferred    Extremities: no c/c    Neurological: grossly non-focal    Psychiatric: a&o x 3      ______________________________________________________________________  LABS:                        11.9   13.7  )-----------( 267      ( 25 Sep 2019 10:33 )             36.3     09-25    134<L>  |  95<L>  |  7   ----------------------------<  89  3.7   |  29  |  0.79    Ca    9.2      25 Sep 2019 10:33  Phos  3.2     09-25  Mg     1.7     09-25    TPro  6.5  /  Alb  3.9  /  TBili  0.5  /  DBili  x   /  AST  19  /  ALT  12  /  AlkPhos  48  09-25    LIVER FUNCTIONS - ( 25 Sep 2019 03:11 )  Alb: 3.9 g/dL / Pro: 6.5 g/dL / ALK PHOS: 48 U/L / ALT: 12 U/L / AST: 19 U/L / GGT: x           ______________________________________________________________________  IMAGING:  CT Abdomen and Pelvis w/ Oral Cont and w/ IV Cont:   EXAM:  CT ABDOMEN AND PELVIS OC IC                          PROCEDURE DATE:  09/25/2019      INTERPRETATION:  CLINICAL INFORMATION: Abdominal pain.     COMPARISON: CT abdomen/pelvis 7/19/2019.    PROCEDURE:   CT of the Abdomen and Pelvis was performed with intravenous contrast.   Intravenous contrast: 90 ml Omnipaque 350. 10 ml discarded.  Oral contrast: positive contrast was administered.  Sagittal and coronal reformats were performed.    FINDINGS:    LOWER CHEST: Subsegmental atelectasis.    LIVER: Within normal limits.  BILE DUCTS: Normal caliber.  GALLBLADDER: No significant gallbladder wall edema.  SPLEEN: Again noted, indeterminate lesions.  PANCREAS: Within normal limits.    ADRENALS: Within normal limits.  KIDNEYS/URETERS: No hydronephrosis, hydroureter or significant   perinephric stranding.  BLADDER: Within normal limits.  REPRODUCTIVE ORGANS: Unremarkable uterus. Persistent left > right adnexal lesion.    BOWEL: Moderate hiatal hernia. Unremarkable appendix. Colon diverticulosis. Wall thickening of the distal transverse, descending and proximal sigmoid colon with mild surrounding stranding.  PERITONEUM: No free air or drainable fluid collection.  VESSELS: Atherosclerosis. Normal caliber abdominal aorta. Mostly patent, proximal/mid SMA and GET although distal branches are beyond the resolution of this study.  RETROPERITONEUM/LYMPH NODES: No lymphadenopathy.    ABDOMINAL WALL: Small fat-containing umbilical hernia.  BONES: Degenerative changes of the spine. Stable grade 1 anterolisthesis of L4 on L5.    FLEX DUKES M.D., RADIOLOGY RESIDENT  This document has been electronically signed.  AJITH HAMMONDS M.D., ATTENDING RADIOLOGIST  This document has been electronically signed. Sep 25 2019  5:48AM      ______________________________________________________________________  ASSESSMENT:  86y Female with acute colitis, differential of ischemic, infectious or inflammatory.  Favor infectious given abrupt onset/elevated wbc but new onset ibd is a possibility.  ischemic colitis felt to be less likely as no blood seen in the stool; however, still a possibility.      PLAN:  1.  clear liquid diet, advance in AM if pain improved  2.  Stool for GI pathogen panel  3.  IV Cipro/Flagyl pending stool test results  4.  Hold of endoscopic evaluation unless symptoms fail to improve    Mushtaq Nickerson M.D.  [facility]  (O) [phone]

## 2019-09-25 NOTE — H&P ADULT - PROBLEM SELECTOR PLAN 3
multifactorial, history of SIADH, worsened today 2/2 diarrhea and volume depletion.  -encourage PO intake, trend Na  -if worsens would check Mark, UOsm prior to given NS.

## 2019-09-25 NOTE — ED ADULT NURSE REASSESSMENT NOTE - NS ED NURSE REASSESS COMMENT FT1
0710- Pt admitted. requesting to us bathroom. Cipro to be started. Pt alert and orientated. Awaiting bed assignment. Will continue to monitor.

## 2019-09-25 NOTE — CONSULT NOTE ADULT - SUBJECTIVE AND OBJECTIVE BOX
Patient seen and evaluated @ bedsidr  Chief Complaint:   abdminal pain and diarrhea    HPI:  This is an 85 yo female PMHx HTN, GERD, osteoarthritis, PMR, recent ED visit in July for similar abdominal complaints presenting with lower abdominal pain, diarrhea and nausea.  Patient was in usual state of health when ate lunch. Had a turkey sandwich, per patient this is usual for her. Then shortly after had severe abdominal pain lower quadrants and then had large volume diarrhea. This was associated with nausea and sense of lightheadedness  This was similar to prior event in July, though the abdominal pain did not linger. Patient came to ED for further evaluation.  No fever, chest pain, SOB, emesis. No melena or hematochezia, stool with brown per patient.  Abdominal pain improved though still had lingering left and right lower quadrant pain. Patient states had prior episodes of diverticulitis, this was a different pain and symptomatology.     CT abdomen/pelvis revealed colitis and patient admitted.    Currently abdominal pain improved and no further episodes of diarrhea since being in hospital (25 Sep 2019 08:58)    Lab work significant for sodium 128, potassium 3.3, WBC 15.4 with L shift.    Later this AM sodium and potassium improving.    Im July, WBC was only mildly elevated without shift.    PMH:   Hypertension  Asthma since childhood  Gastroesophageal reflux disease, ? evidence of prior Parish's esophagus  Hyponatremia  Diverticulitis x 3  Temporal arteritis/ PMR now quiescent  SVT  Pre-diabetes  Pernicious anemia  BL carpal tunnel syndrome  Gallstones symptomatic on one occasion  Bilateral carotid stenosis - moderate right, mild left  Aortic stenosis, moderate  IgG kappa paraproteinemia  Spinal stenosis  Basal cell carcinoma eyelid  Mobitz I block when on higher doses of metoprolol and verapamil         PSH:   T & A as child  History of cataract extraction, BL 2013  L lower lip Mohs for basal cell 2013  Blepharoplasty OD    Medications:   ciprofloxacin     Tablet 500 milliGRAM(s) Oral every 12 hours  enoxaparin Injectable 40 milliGRAM(s) SubCutaneous daily  losartan 100 milliGRAM(s) Oral daily  metoprolol succinate ER 50 milliGRAM(s) Oral daily  pantoprazole    Tablet 40 milliGRAM(s) Oral before breakfast  predniSONE   Tablet 3 milliGRAM(s) Oral daily  sodium chloride 1 Gram(s) Oral daily  verapamil  milliGRAM(s) Oral daily    Additional outpatient meds:  Torsemide 5 mg M/W/F  Omeprazole 20 mg qd  pravachol 20 mg qd ? if compliant      Allergies:  penicillins (Hives)    FAMILY HISTORY:  Family history of acute myocardial infarction father age 56  HTN 2 sisters  1 sister CHF  1 sister PVD  1 sister bladder ca  2 brothers bladder ca  1 brother PVD    Social History:  Smoking: None    REVIEW OF SYSTEMS:  See HPI  RESPIRATORY: No current wheezing, hemoptysis; No shortness of breath  CARDIOVASCULAR: No chest pain.  + intermittent palpitations  All other review of systems is negative unless indicated above    Physical Exam:  T(C): 36.7 (09-25-19 @ 10:41), Max: 36.7 (09-25-19 @ 02:39)  HR: 60 (09-25-19 @ 10:41) (60 - 87)  BP: 148/72 (09-25-19 @ 10:41) (148/72 - 180/72)  RR: 18 (09-25-19 @ 10:41) (18 - 20)  SpO2: 95% (09-25-19 @ 10:41) (95% - 99%)  Wt(kg): --    Daily Height in cm: 152.4 (25 Sep 2019 02:39)    Daily     Appearance:  Normal, NAD  Eyes:  PERRL, EOMI  HEENT: Normal oral [place], NC/AT  Neck:  No JVD or HJR  Respiratory: Clear to auscultation bilaterally  Cardiovascular: Normal S1 and S2 without murmurs, rubs or gallops  Abdomen:   BS normal, Soft,  Non-tender without organomegally or masses  Extremities: Without edema, pulses are full      Labs:                        11.9   13.7  )-----------( 267      ( 25 Sep 2019 10:33 )             36.3     09-25    134<L>  |  95<L>  |  7   ----------------------------<  89  3.7   |  29  |  0.79    Ca    9.2      25 Sep 2019 10:33  Phos  3.2     09-25  Mg     1.7     09-25    TPro  6.5  /  Alb  3.9  /  TBili  0.5  /  DBili  x   /  AST  19  /  ALT  12  /  AlkPhos  48  09-25    PT/INR - ( 25 Sep 2019 03:11 )   PT: 12.1 sec;   INR: 1.05 ratio         PTT - ( 25 Sep 2019 03:11 )  PTT:27.3 sec        CT Abdomen/Pelvis    FINDINGS:    LOWER CHEST: Subsegmental atelectasis.    LIVER: Within normal limits.  BILE DUCTS: Normal caliber.  GALLBLADDER: No significant gallbladder wall edema.  SPLEEN: Again noted, indeterminate lesions.  PANCREAS: Within normal limits.    ADRENALS: Within normal limits.  KIDNEYS/URETERS: No hydronephrosis, hydroureter or significant   perinephric stranding.  BLADDER: Within normal limits.  REPRODUCTIVE ORGANS: Unremarkable uterus. Persistent left > right adnexal   lesion.    BOWEL: Moderate hiatal hernia. Unremarkable appendix. Colon   diverticulosis. Wall thickening of the distal transverse, descending and   proximal sigmoid colon with mild surrounding stranding.  PERITONEUM: No free air or drainable fluid collection.  VESSELS: Atherosclerosis. Normal caliber abdominal aorta. Mostly patent,   proximal/mid SMA and GET although distal branches are beyond the   resolution of this study.  RETROPERITONEUM/LYMPH NODES: No lymphadenopathy.    ABDOMINAL WALL: Small fat-containing umbilical hernia.  BONES: Degenerative changes of the spine. Stable grade 1 anterolisthesis   of L4 on L5.

## 2019-09-25 NOTE — ED ADULT NURSE NOTE - OBJECTIVE STATEMENT
85 Yo female PMHx Polymyalgia rheumatica, HTN c/o b/l LQ pain since this evening with 1 episode of large volume diarrhea since this afternoon. Patient reports having a similar episode in July with negative CT findings and d/c'ed home. Patient reports that episode in July did not present with pain. Patient is A&OX3, abdomen soft, non-distended, LLQ tenderness. denies chest pain, SOB, HA, fever/chills, urinary symptoms, hematuria, weakness, dizziness, numbness, tinging. Peripheral pulses present b/l. Skin warm, dry and pink. Pt placed in position of comfort. Pt educated on call bell system and provided call bell. Bed in lowest position, wheels locked, appropriate side rails raised. Pt denies needs at this time.

## 2019-09-25 NOTE — H&P ADULT - RS GEN PE MLT RESP DETAILS PC
airway patent/breath sounds equal/normal/no rhonchi/no wheezes/clear to auscultation bilaterally/no rales

## 2019-09-25 NOTE — ED PROVIDER NOTE - ATTENDING CONTRIBUTION TO CARE
attending Anderson: 86yF h/o PMR, HTN p/w diffuse lower abd pain and one episode large volume watery diarrhea today. Nausea without vomiting. No recent abx. Moderate lower abdominal tenderness on exam. Will obtain labs, CT A/P, IVF, reassess.

## 2019-09-25 NOTE — ED PROVIDER NOTE - PHYSICAL EXAMINATION
Gen: Well appearing, NAD  Head: NCAT  HEENT: PERRL, MMM, normal conjunctiva, anicteric, neck supple  Lung: CTAB, no adventitious sounds  CV: RRR, no murmurs  Abd: soft, moderaetly ttp diffuse lower abd, no rebound or guarding, no CVAT  MSK: No edema, no visible deformities  Neuro: Moving all extremity grossly  Skin: Warm and dry, no evidence of rash  Psych: normal mood and affect

## 2019-09-25 NOTE — H&P ADULT - HISTORY OF PRESENT ILLNESS
This is an 85 yo female PMHx HTN, GERD, PMR, recent ED visit in July for similary abodminal complaints presenting with abdominal pain, diarrhea and nausea.  Patient was in usual state of health when ate lunch. Had a turkey sandwich, per patient this is usual for her. Then shortly after had severe abdominal pain lower quadrants and then had large volume diarrhea. This was associated with nausea and sense of lightheadeness.  This was similar to prior event in July, though the abdominal pain did not linger. Patient came to ED for further evaluatyion. No fever, chest pain, SOB, emesis. No melena or hematochezia, stool with brown per patient.  Abdominal pain improved though still had lingering left and right lower quadrant pain. Patient states had prior episodes of diverticulitis, this was a different pain and symptomatology.     In ED: CT abdomen/pelvis revealed colitis and patient admitted.  Currently abdominal pain improved and no furhter episodes of diarrhea since being in hospital

## 2019-09-25 NOTE — H&P ADULT - NSHPLABSRESULTS_GEN_ALL_CORE
11.9   15.4  )-----------( 269      ( 25 Sep 2019 03:11 )             36.3   09-25    128<L>  |  89<L>  |  9   ----------------------------<  142<H>  3.3<L>   |  26  |  0.81    Ca    9.0      25 Sep 2019 03:11  Phos  3.2     09-25  Mg     1.7     09-25    TPro  6.5  /  Alb  3.9  /  TBili  0.5  /  DBili  x   /  AST  19  /  ALT  12  /  AlkPhos  48  09-25    Blood Gas Venous - Lactate: 1.6 mmoL/L (09.25.19 @ 03:11) 11.9   15.4  )-----------( 269      ( 25 Sep 2019 03:11 )             36.3   09-25    128<L>  |  89<L>  |  9   ----------------------------<  142<H>  3.3<L>   |  26  |  0.81    Ca    9.0      25 Sep 2019 03:11  Phos  3.2     09-25  Mg     1.7     09-25    TPro  6.5  /  Alb  3.9  /  TBili  0.5  /  DBili  x   /  AST  19  /  ALT  12  /  AlkPhos  48  09-25    Blood Gas Venous - Lactate: 1.6 mmoL/L (09.25.19 @ 03:11)    < from: CT Abdomen and Pelvis w/ Oral Cont and w/ IV Cont (09.25.19 @ 04:26) >    RESSION:     Left-sided colitis (infectious, inflammatory or ischemic etiology).    Persistent left > right adnexal lesion. Neoplasm cannot be excluded.   Follow-up nonemergent pelvic ultrasound and/or MRI may be obtained for   further evaluation.    Additional findings as described.        < end of copied text >

## 2019-09-25 NOTE — ED PROVIDER NOTE - CLINICAL SUMMARY MEDICAL DECISION MAKING FREE TEXT BOX
nausea, lower abd pain, diarrhea x 1 day. Moderately ttp on exam. Gastroenteritis vs divertic vs surgical intraabd pathology. Labs, CT, reassess.

## 2019-09-25 NOTE — ED ADULT NURSE NOTE - NSIMPLEMENTINTERV_GEN_ALL_ED
Implemented All Fall Risk Interventions:  [place] to call system. Call bell, personal items and telephone within reach. Instruct patient to call for assistance. Room bathroom lighting operational. Non-slip footwear when patient is off stretcher. Physically safe environment: no spills, clutter or unnecessary equipment. Stretcher in lowest position, wheels locked, appropriate side rails in place. Provide visual cue, wrist band, yellow gown, etc. Monitor gait and stability. Monitor for mental status changes and reorient to person, place, and time. Review medications for side effects contributing to fall risk. Reinforce activity limits and safety measures with patient and family.

## 2019-09-25 NOTE — CONSULT NOTE ADULT - ASSESSMENT
Impression:  Colitis R/O infectious or ischemic.                       Known atherosclerotic disease of carotids.                       Moderate aortic stenosis.                      Hypertension.                       Hyponatremia chronic but exacerbated by diarrhea.                      Hypokalemia related to diarrhea.    Plan:            GI consult                          Continue cipro for now.                       Follow electrolytes and CBC.                       Continue other home medication.

## 2019-09-25 NOTE — H&P ADULT - PROBLEM SELECTOR PLAN 2
continue home medications of toprol 50mg daily, losartan 100mg daily and varapamil 240mg SR with holding parameters

## 2019-09-25 NOTE — ED PROVIDER NOTE - OBJECTIVE STATEMENT
86F hx Polymyalgia rheumatica, htn p/w diffuse lower abd pain, large volume diarrhea since this afternoon. Had similar sx without abd pain in june and had negative CT and dc'ed home to self resolve. Assc with nausea no vomiting. Denies fever, chills, cp, sob, back pain, urinary sx. No recent abx use.

## 2019-09-25 NOTE — H&P ADULT - NSICDXPASTMEDICALHX_GEN_ALL_CORE_FT
PAST MEDICAL HISTORY:  Gastroesophageal reflux disease, esophagitis presence not specified     Hypertension     Hypertension

## 2019-09-25 NOTE — H&P ADULT - PROBLEM SELECTOR PLAN 1
-ischemic vs. infectious  -my suspicion is ischemic given in watershed area, occurred for second time after eating, with abodminal pain diarrhea and then cessation of symtpoms without continued diarrhea that would suggest more infectious etiology.  -regardless, will empirically treat. Patient statse cannot take flagyl so will continue cipro.  -trend WBC  -discussed with Dr. Figueroa, called GI for further evalution.   -restart diet.

## 2019-09-25 NOTE — H&P ADULT - NSHPPHYSICALEXAM_GEN_ALL_CORE
Vital Signs Last 24 Hrs  T(C): 36.7 (25 Sep 2019 07:01), Max: 36.7 (25 Sep 2019 02:39)  T(F): 98 (25 Sep 2019 07:01), Max: 98.1 (25 Sep 2019 02:39)  HR: 87 (25 Sep 2019 07:01) (71 - 87)  BP: 167/67 (25 Sep 2019 07:01) (167/67 - 180/72)  BP(mean): --  RR: 18 (25 Sep 2019 07:01) (18 - 20)  SpO2: 99% (25 Sep 2019 07:01) (96% - 99%)

## 2019-09-26 ENCOUNTER — TRANSCRIPTION ENCOUNTER (OUTPATIENT)
Age: 84
End: 2019-09-26

## 2019-09-26 VITALS
TEMPERATURE: 98 F | OXYGEN SATURATION: 98 % | SYSTOLIC BLOOD PRESSURE: 118 MMHG | DIASTOLIC BLOOD PRESSURE: 58 MMHG | RESPIRATION RATE: 18 BRPM | HEART RATE: 57 BPM

## 2019-09-26 LAB
ALBUMIN SERPL ELPH-MCNC: 3.7 G/DL — SIGNIFICANT CHANGE UP (ref 3.3–5)
ALP SERPL-CCNC: 42 U/L — SIGNIFICANT CHANGE UP (ref 40–120)
ALT FLD-CCNC: 10 U/L — SIGNIFICANT CHANGE UP (ref 10–45)
ANION GAP SERPL CALC-SCNC: 10 MMOL/L — SIGNIFICANT CHANGE UP (ref 5–17)
AST SERPL-CCNC: 18 U/L — SIGNIFICANT CHANGE UP (ref 10–40)
BASOPHILS # BLD AUTO: 0 K/UL — SIGNIFICANT CHANGE UP (ref 0–0.2)
BASOPHILS NFR BLD AUTO: 0.1 % — SIGNIFICANT CHANGE UP (ref 0–2)
BILIRUB SERPL-MCNC: 0.2 MG/DL — SIGNIFICANT CHANGE UP (ref 0.2–1.2)
BUN SERPL-MCNC: 9 MG/DL — SIGNIFICANT CHANGE UP (ref 7–23)
CALCIUM SERPL-MCNC: 9.2 MG/DL — SIGNIFICANT CHANGE UP (ref 8.4–10.5)
CHLORIDE SERPL-SCNC: 94 MMOL/L — LOW (ref 96–108)
CO2 SERPL-SCNC: 28 MMOL/L — SIGNIFICANT CHANGE UP (ref 22–31)
CREAT SERPL-MCNC: 0.99 MG/DL — SIGNIFICANT CHANGE UP (ref 0.5–1.3)
EOSINOPHIL # BLD AUTO: 0.2 K/UL — SIGNIFICANT CHANGE UP (ref 0–0.5)
EOSINOPHIL NFR BLD AUTO: 1.4 % — SIGNIFICANT CHANGE UP (ref 0–6)
GLUCOSE SERPL-MCNC: 118 MG/DL — HIGH (ref 70–99)
HCT VFR BLD CALC: 32.9 % — LOW (ref 34.5–45)
HGB BLD-MCNC: 11.2 G/DL — LOW (ref 11.5–15.5)
LYMPHOCYTES # BLD AUTO: 1.4 K/UL — SIGNIFICANT CHANGE UP (ref 1–3.3)
LYMPHOCYTES # BLD AUTO: 12 % — LOW (ref 13–44)
MCHC RBC-ENTMCNC: 30.2 PG — SIGNIFICANT CHANGE UP (ref 27–34)
MCHC RBC-ENTMCNC: 34.1 GM/DL — SIGNIFICANT CHANGE UP (ref 32–36)
MCV RBC AUTO: 88.5 FL — SIGNIFICANT CHANGE UP (ref 80–100)
MONOCYTES # BLD AUTO: 1.3 K/UL — HIGH (ref 0–0.9)
MONOCYTES NFR BLD AUTO: 11.4 % — SIGNIFICANT CHANGE UP (ref 2–14)
NEUTROPHILS # BLD AUTO: 8.6 K/UL — HIGH (ref 1.8–7.4)
NEUTROPHILS NFR BLD AUTO: 75.1 % — SIGNIFICANT CHANGE UP (ref 43–77)
PLATELET # BLD AUTO: 248 K/UL — SIGNIFICANT CHANGE UP (ref 150–400)
POTASSIUM SERPL-MCNC: 4.4 MMOL/L — SIGNIFICANT CHANGE UP (ref 3.5–5.3)
POTASSIUM SERPL-SCNC: 4.4 MMOL/L — SIGNIFICANT CHANGE UP (ref 3.5–5.3)
PROT SERPL-MCNC: 6.3 G/DL — SIGNIFICANT CHANGE UP (ref 6–8.3)
RBC # BLD: 3.72 M/UL — LOW (ref 3.8–5.2)
RBC # FLD: 12.3 % — SIGNIFICANT CHANGE UP (ref 10.3–14.5)
SODIUM SERPL-SCNC: 132 MMOL/L — LOW (ref 135–145)
WBC # BLD: 11.5 K/UL — HIGH (ref 3.8–10.5)
WBC # FLD AUTO: 11.5 K/UL — HIGH (ref 3.8–10.5)

## 2019-09-26 RX ORDER — ACETAMINOPHEN 500 MG
2 TABLET ORAL
Qty: 0 | Refills: 0 | DISCHARGE

## 2019-09-26 RX ORDER — CIPROFLOXACIN LACTATE 400MG/40ML
1 VIAL (ML) INTRAVENOUS
Qty: 10 | Refills: 0
Start: 2019-09-26 | End: 2019-09-30

## 2019-09-26 RX ORDER — CIPROFLOXACIN LACTATE 400MG/40ML
1 VIAL (ML) INTRAVENOUS
Qty: 0 | Refills: 0 | DISCHARGE
Start: 2019-09-26

## 2019-09-26 RX ORDER — LOSARTAN POTASSIUM 100 MG/1
1 TABLET, FILM COATED ORAL
Qty: 0 | Refills: 0 | DISCHARGE
Start: 2019-09-26

## 2019-09-26 RX ORDER — METOPROLOL TARTRATE 50 MG
1 TABLET ORAL
Qty: 0 | Refills: 0 | DISCHARGE
Start: 2019-09-26

## 2019-09-26 RX ORDER — CHOLECALCIFEROL (VITAMIN D3) 125 MCG
0 CAPSULE ORAL
Qty: 0 | Refills: 0 | DISCHARGE

## 2019-09-26 RX ORDER — SODIUM CHLORIDE 9 MG/ML
1 INJECTION INTRAMUSCULAR; INTRAVENOUS; SUBCUTANEOUS
Qty: 0 | Refills: 0 | DISCHARGE
Start: 2019-09-26

## 2019-09-26 RX ORDER — SIMETHICONE 80 MG/1
0 TABLET, CHEWABLE ORAL
Qty: 0 | Refills: 0 | DISCHARGE

## 2019-09-26 RX ORDER — METOPROLOL TARTRATE 50 MG
2 TABLET ORAL
Qty: 0 | Refills: 0 | DISCHARGE

## 2019-09-26 RX ADMIN — PANTOPRAZOLE SODIUM 40 MILLIGRAM(S): 20 TABLET, DELAYED RELEASE ORAL at 05:55

## 2019-09-26 RX ADMIN — LOSARTAN POTASSIUM 100 MILLIGRAM(S): 100 TABLET, FILM COATED ORAL at 05:55

## 2019-09-26 RX ADMIN — Medication 3 MILLIGRAM(S): at 11:39

## 2019-09-26 RX ADMIN — Medication 500 MILLIGRAM(S): at 05:55

## 2019-09-26 RX ADMIN — SODIUM CHLORIDE 1 GRAM(S): 9 INJECTION INTRAMUSCULAR; INTRAVENOUS; SUBCUTANEOUS at 11:38

## 2019-09-26 RX ADMIN — Medication 240 MILLIGRAM(S): at 05:55

## 2019-09-26 NOTE — DISCHARGE NOTE NURSING/CASE MANAGEMENT/SOCIAL WORK - PATIENT PORTAL LINK FT
You can access the FollowMyHealth Patient Portal offered by [facility] by registering at the following website: [email or web address]. By joining [employer]’s FollowMyHealth portal, you will also be able to view your health information using other applications (apps) compatible with our system.

## 2019-09-26 NOTE — PROGRESS NOTE ADULT - ASSESSMENT
Impression  Clinically improved.    Plan:  Await labs from today.            OK with GI recommendation to D/C on antibiotics if tolerates breakfast and lunch.             Cipro.

## 2019-09-26 NOTE — PROGRESS NOTE ADULT - SUBJECTIVE AND OBJECTIVE BOX
SUBJECTIVE:  Feeling better. Has not had any additional diarrhea since the episode 2 days ago. Feels "gas" but no longer has sharp abdominal pain. Has only eaten a small amount so far.  _____________________________________________________  OBJECTIVE:    T(C): 36.4 (09-26-19 @ 05:47), Max: 36.8 (09-25-19 @ 19:55)  HR: 60 (09-26-19 @ 05:47)  BP: 140/57 (09-26-19 @ 05:47)  RR: 18 (09-26-19 @ 05:47)  SpO2: 97% (09-26-19 @ 05:47)  Wt(kg): --    General = Comfortable-appearing, no acute distress  Abdomen = Non-distended, normal active bowel sounds, soft, nontender, no palpable mass or organomegaly, no bruit  _____________________________________________________  LABS:                        11.9   13.7  )-----------( 267      ( 25 Sep 2019 10:33 )             36.3     09-25    134<L>  |  95<L>  |  7   ----------------------------<  89  3.7   |  29  |  0.79    Ca    9.2      25 Sep 2019 10:33  Phos  3.2     09-25  Mg     1.7     09-25    TPro  6.5  /  Alb  3.9  /  TBili  0.5  /  DBili  x   /  AST  19  /  ALT  12  /  AlkPhos  48  09-25    LIVER FUNCTIONS - ( 25 Sep 2019 03:11 )  Alb: 3.9 g/dL / Pro: 6.5 g/dL / ALK PHOS: 48 U/L / ALT: 12 U/L / AST: 19 U/L / GGT: x             _____________________________________________________  ACTIVE MEDS:  MEDICATIONS  (STANDING):  ciprofloxacin     Tablet 500 milliGRAM(s) Oral every 12 hours  enoxaparin Injectable 40 milliGRAM(s) SubCutaneous daily  losartan 100 milliGRAM(s) Oral daily  metoprolol succinate ER 50 milliGRAM(s) Oral daily  pantoprazole    Tablet 40 milliGRAM(s) Oral before breakfast  predniSONE   Tablet 3 milliGRAM(s) Oral daily  sodium chloride 1 Gram(s) Oral daily  verapamil  milliGRAM(s) Oral daily    MEDICATIONS  (PRN):    _____________________________________________________  ASSESSMENT:  86yFemale with acute colitis of unclear etiology. Unable to rule out infection because the diarrhea resolved so the patient has been unable to submit a specimen. Nevertheless, she appears completely non-toxic on the present regimen.    PLAN:  Continue Cipro for 5 day course  Continue present diet--if able to tolerate breakfast and lunch, consider discharge home later today  Should follow-up with Dr. Nickerson in 7-10 days, especially because this was her second episode of acute diarrhea within the past 2 months    Julio Cesar Gutiérrez M.D.  (O) [phone]  (C) [phone]
No further BM.  Had some gaseousness this AM, now resolved and no abdominal pain.        REVIEW OF SYSTEMS:  CARDIOVASCULAR: No chest pain, dyspnea or palpitations  All other review of systems is negative unless indicated above    Medications:  ciprofloxacin     Tablet 500 milliGRAM(s) Oral every 12 hours  enoxaparin Injectable 40 milliGRAM(s) SubCutaneous daily  losartan 100 milliGRAM(s) Oral daily  metoprolol succinate ER 50 milliGRAM(s) Oral daily  pantoprazole    Tablet 40 milliGRAM(s) Oral before breakfast  predniSONE   Tablet 3 milliGRAM(s) Oral daily  sodium chloride 1 Gram(s) Oral daily  verapamil  milliGRAM(s) Oral daily      Physical Exam:  Vitals:  T(C): 36.4 (09-26-19 @ 05:47), Max: 36.8 (09-25-19 @ 19:55)  HR: 60 (09-26-19 @ 05:47) (53 - 64)  BP: 140/57 (09-26-19 @ 05:47) (114/68 - 169/61)  BP(mean): --  RR: 18 (09-26-19 @ 05:47) (18 - 18)  SpO2: 97% (09-26-19 @ 05:47) (95% - 97%)  Wt(kg): --  Daily     Daily   I&O's Summary    25 Sep 2019 07:01  -  26 Sep 2019 07:00  --------------------------------------------------------  IN: 610 mL / OUT: 0 mL / NET: 610 mL        Appearance:  Normal, NAD  Eyes:  EOMI  HEENT: Normal oral mucosa NC/AT  Neck:  No JVD  Respiratory: Clear to auscultation bilaterally  Cardiovascular: Normal S1 and S2 with soft basal GREGORIO and faint systolic murmur LSB.  No rubs or gallops  Abdomen:   BS normal, Soft,  Non-tender without organomegaly or masses  Extremities: Without edema      09-26 pending    09-25    Complete Blood Count (09.25.19 @ 10:33)    WBC Count: 13.7 K/uL    RBC Count: 4.14 M/uL    Hemoglobin: 11.9 g/dL    Hematocrit: 36.3 %    Mean Cell Volume: 87.7 fl    Mean Cell Hemoglobin: 28.9 pg    Mean Cell Hemoglobin Conc: 32.9 gm/dL    Red Cell Distrib Width: 12.1 %    Platelet Count - Automated: 267 K/uL        134<L>  |  95<L>  |  7   ----------------------------<  89  3.7   |  29  |  0.79    Ca    9.2      25 Sep 2019 10:33  Phos  3.2     09-25  Mg     1.7     09-25    TPro  6.5  /  Alb  3.9  /  TBili  0.5  /  DBili  x   /  AST  19  /  ALT  12  /  AlkPhos  48  09-25    PT/INR - ( 25 Sep 2019 03:11 )   PT: 12.1 sec;   INR: 1.05 ratio         PTT - ( 25 Sep 2019 03:11 )  PTT:27.3 sec

## 2019-09-26 NOTE — CHART NOTE - NSCHARTNOTEFT_GEN_A_CORE
follow up- pt is cleared by attending MD for discharge home; discussed discharge medication/follow up.  Keila Rios(NP)  [facility], [phone]

## 2019-09-26 NOTE — DISCHARGE NOTE PROVIDER - HOSPITAL COURSE
86y Female with acute colitis, differential of ischemic, infectious or inflammatory. seen by GI; on cipro; diet as tolerated. 86y Female with Hypertension, Gastroesophageal reflux disease, acute colitis, differential of ischemic, infectious or inflammatory. no diarrhea; seen by GI; on cipro; diet as tolerated.    continue cipro for 5 days and follow up with GI in 7 days; discussed CT abdomen finding with pt; does not want any further work up; advised Gyn follow up.    pt is cleared by MD for discharge home.    CT abdomen; left-sided colitis (infectious, inflammatory or ischemic etiology).        Persistent left > right adnexal lesion. Neoplasm cannot be excluded.

## 2019-09-26 NOTE — DISCHARGE NOTE PROVIDER - CARE PROVIDER_API CALL
Mushtaq Nickerson)  Gastroenterology  [address], [address]  [place] [address]  Phone: [phone]  Fax: [phone]  Follow Up Time: 1 week    Isaac Figueroa)  Cardiovascular Disease; Internal Medicine  [address], [address]  [place] [address]  Phone: [phone]  Fax: [phone]  Follow Up Time:

## 2019-09-26 NOTE — DISCHARGE NOTE PROVIDER - NSDCCPCAREPLAN_GEN_ALL_CORE_FT
PRINCIPAL DISCHARGE DIAGNOSIS  Diagnosis: Colitis  Assessment and Plan of Treatment: continue antibiotic as prescribed PRINCIPAL DISCHARGE DIAGNOSIS  Diagnosis: Colitis  Assessment and Plan of Treatment: seen by gastroenterologist  continue antibiotic as prescribed  follow up with gastroenterologist in 5-7 days      SECONDARY DISCHARGE DIAGNOSES  Diagnosis: Essential hypertension  Assessment and Plan of Treatment: continue home medication  follow up with your cardiologist    Diagnosis: Gastroesophageal reflux disease without esophagitis  Assessment and Plan of Treatment: Gastroesophageal reflux disease without esophagitis  continue medication  follow by GI    Diagnosis: Hyponatremia  Assessment and Plan of Treatment: Hyponatremia  continue sodium supplement    Diagnosis: PMR (polymyalgia rheumatica)  Assessment and Plan of Treatment: PMR (polymyalgia rheumatica)  continue steroid PRINCIPAL DISCHARGE DIAGNOSIS  Diagnosis: Colitis  Assessment and Plan of Treatment: seen by gastroenterologist  continue antibiotic as prescribed  follow up with gastroenterologist in 7 days      SECONDARY DISCHARGE DIAGNOSES  Diagnosis: Essential hypertension  Assessment and Plan of Treatment: continue home medication and follow up with MD on adjusting medication  follow up with your cardiologist    Diagnosis: Gastroesophageal reflux disease without esophagitis  Assessment and Plan of Treatment: Gastroesophageal reflux disease without esophagitis  continue medication  follow by GI    Diagnosis: Hyponatremia  Assessment and Plan of Treatment: Hyponatremia  continue sodium supplement and diet/fluid as discussed    Diagnosis: PMR (polymyalgia rheumatica)  Assessment and Plan of Treatment: PMR (polymyalgia rheumatica)  continue steroid

## 2019-10-01 PROCEDURE — 74177 CT ABD & PELVIS W/CONTRAST: CPT

## 2019-10-01 PROCEDURE — 82803 BLOOD GASES ANY COMBINATION: CPT

## 2019-10-01 PROCEDURE — 83735 ASSAY OF MAGNESIUM: CPT

## 2019-10-01 PROCEDURE — 82330 ASSAY OF CALCIUM: CPT

## 2019-10-01 PROCEDURE — 82435 ASSAY OF BLOOD CHLORIDE: CPT

## 2019-10-01 PROCEDURE — 85610 PROTHROMBIN TIME: CPT

## 2019-10-01 PROCEDURE — 85014 HEMATOCRIT: CPT

## 2019-10-01 PROCEDURE — 82947 ASSAY GLUCOSE BLOOD QUANT: CPT

## 2019-10-01 PROCEDURE — 83690 ASSAY OF LIPASE: CPT

## 2019-10-01 PROCEDURE — 81001 URINALYSIS AUTO W/SCOPE: CPT

## 2019-10-01 PROCEDURE — 96375 TX/PRO/DX INJ NEW DRUG ADDON: CPT

## 2019-10-01 PROCEDURE — 84100 ASSAY OF PHOSPHORUS: CPT

## 2019-10-01 PROCEDURE — 84132 ASSAY OF SERUM POTASSIUM: CPT

## 2019-10-01 PROCEDURE — 85730 THROMBOPLASTIN TIME PARTIAL: CPT

## 2019-10-01 PROCEDURE — 83605 ASSAY OF LACTIC ACID: CPT

## 2019-10-01 PROCEDURE — 99285 EMERGENCY DEPT VISIT HI MDM: CPT | Mod: 25

## 2019-10-01 PROCEDURE — 85027 COMPLETE CBC AUTOMATED: CPT

## 2019-10-01 PROCEDURE — 80053 COMPREHEN METABOLIC PANEL: CPT

## 2019-10-01 PROCEDURE — 96374 THER/PROPH/DIAG INJ IV PUSH: CPT | Mod: XU

## 2019-10-01 PROCEDURE — 80048 BASIC METABOLIC PNL TOTAL CA: CPT

## 2019-10-01 PROCEDURE — 84295 ASSAY OF SERUM SODIUM: CPT

## 2020-02-25 NOTE — PATIENT PROFILE ADULT. - PROVIDER NOTIFICATION
INTERVENTIONAL PULMONOLOGY       Procedure(s):    A flexible and rigid bronchoscopy   Airway exam  Balloon bronchoplasty without stent placement (1 site)   Bronchial washing (1 site)  Bronchography (1 site)   EBUS-TBNA (3 sites)  Endobronchial cryobiopsies (1 site)  Fluoroscopic guidance   Therapeutic suctioning (3 site)  Tissue/tumor debulking     Procedure Date: 2/25/2020    Indication:  Left upper lobe atelectasis    Attending of Record:  Adan Garcia MD    Interventional Pulmonary Fellow   Juvenal Hill MD     Trainees Present:   None    Medications:    General Anesthesia - See anesthesia flowsheet for details    Sedation Time:   Per Anesthesia Care Provider    Time Out:  Performed    The patient's medical record has been reviewed.  The indication for the procedure was reviewed.  The necessary history and physical examination was performed and reviewed.  The risks, benefits and alternatives of the procedure were discussed with the the patient in detail and he had the opportunity to ask questions.  I discussed in particular the potential complications including risks of minor or life-threatening bleeding and/or infection, respiratory failure, vocal cord trauma / paralysis, pneumothorax, and discomfort. Sedation risks were also discussed including abnormal heart rhythms, low blood pressure, and respiratory failure. All questions were answered to the best of my ability.  Verbal and written informed consent was obtained.  The proposed procedure and the patient's identification were verified prior to the procedure by the physician and the nurse.    The patient was assessed for the adequacy for the procedure and to receive medications.   Mental Status:  Alert and oriented x 3  Airway examination:  Class I (Complete visualization of soft palate)  Pulmonary:  Clear to ausculation bilaterally  CV:  RRR, no murmurs or gallops  ASA Grade:  (III)  Severe systemic disease    After clinical evaluation and reviewing the  indication, risks, alternatives and benefits of the procedure the patient was deemed to be in satisfactory condition to undergo the procedure.      A Tuberculosis risk assessment was performed:  The patient has no known RISK of Tuberculosis    The procedure was performed in a negative airflow room: The patient could not be moved to a negative airflow room because of needed OR for the procedure    Maneuvers / Procedure:    A Flexible and 12mm Rigid bronchoscope bronchoscope was used for the procedure. The rigid bronchoscope was inserted into the mouth. Uvula, epiglottis and vocal cords were seen. The scope was advanced turning the bevel to 90degress while passing through the cords and into the trachea.     Airway Examination:   A complete airway examination was performed from the distal trachea to the subsegmental level in each lobe of both lungs.  Pertinent findings include normal distal trachea, sharp mainstem albino, normal appearing R bronchial tree, normal caliber of L mainstem bronchus, and normal LLL anatomy. The LC2 albino is rounded. There is pale/white appearing, friable, exophytic, fungating tumor causing 100% occlusion of the left upper lobe. After extensive tumor debulking and airway dilation, we were able to identify the LC1 albino but could not identify viable, distal, patent airways for which to attempt stenting to.         Bronchial washing:  JESSY was washed and sent for analysis.     Endobronchial biopsies:   One Site - The bronchoscope was inserted.  Topical epinephrine was administered.  The 2.4 mm ERBE cryoprobe was introduced and endobronchial cryobiopsies were obtained from the fungating, exophytic tumor in the left upper lobe bronchus. Abundant material was collected for permanent.     Therapeutic suctioning: 15-20 min of operative time was spent clearing out the airway of debris, blood and mucous prior to the intervention.     Tumor/Tissue Debulking: The left mainstem airway was accessed and  tumor/tissue debulking was performed using the 7F ERBE APC straight probe, and 2.4mm and 1.9 mm ERBE cryoprobes. Hemostasis and patency of the airway was acheived post-debulking.    Bronchogram:   A 50:50 mix of NS and Isovue 200 was used to instill 5cc into the JESSY. Distal contrast was seen, the pattern of which suggestive a completely atelectatic left upper lobe.    Airway dilation:   Airway dilation#1: The 8-9-10mm Elation Ballooon was used to dilate the JESSY  airway. Each dilation was held for 30 sec and repeated thrice.     EBUS-TBNA: The EBUS scope was inserted and biopsies were obtained from:  1. Station 11R with 3 passes, samples obtained with NALLELY absent  2. Station 7 with 3 passes, samples obtained with NALLELY absent  3. Station 11L with 3 passes, samples obtained with NALLELY absent    10R not identified, 4R was <5 mm, 4L not identified, 10L not identified.     A combination of suction and no suction was used to obtain the samples. EBUS samples were sent for cytology.  Note, all mediastinal, hilar, lobar and segmental stations are evaluated during the procedure and those not listed were not biopsied due to small lymph node diameter, positive NALLELY on higher teagan staging, alternative diagnosis or inability to continue with the procedure.     Any disposable equipment was visually inspected and deemed to be intact immediately post procedure.      Relevant Pictures  Tumor causing 100% endobronchial obstruction of the left upper lobe bronchus; level of LC2 albino, LLL take-off at 8 o'clock      Tumor debulked to the level of the LC1 albino (center), no patent airways identified distally      Recommendations:     Successful rigid bronchoscopy with tumor debulking from JESSY take-off (at level of LC2 albino) to the LC1 albino    No distal, patent airway identified in the JESSY to facilitate stent placement    Successful endobronchial cryo-biopsies; NSCLC identified on preliminary pathology results    Successful EBUS-TBNA  of stations 11R, 7 and 11L    Await pending results in the next 3-5 business days        Juvenal Hill MD, 2/25/2020, 9:39 AM  Interventional Pulmonology Fellow  Department of Pulmonary, Allergy, Critical Care & Sleep Medicine   Pager: [phone]                 Declines

## 2020-06-15 ENCOUNTER — EMERGENCY (EMERGENCY)
Facility: HOSPITAL | Age: 85
LOS: 1 days | Discharge: HOME | End: 2020-06-15
Attending: EMERGENCY MEDICINE
Payer: MEDICARE

## 2020-06-15 VITALS
DIASTOLIC BLOOD PRESSURE: 72 MMHG | RESPIRATION RATE: 16 BRPM | OXYGEN SATURATION: 97 % | SYSTOLIC BLOOD PRESSURE: 184 MMHG | HEART RATE: 61 BPM

## 2020-06-15 VITALS
OXYGEN SATURATION: 97 % | TEMPERATURE: 98 F | WEIGHT: 149.91 LBS | HEIGHT: 60 IN | HEART RATE: 65 BPM | SYSTOLIC BLOOD PRESSURE: 184 MMHG | RESPIRATION RATE: 18 BRPM | DIASTOLIC BLOOD PRESSURE: 72 MMHG

## 2020-06-15 DIAGNOSIS — Z98.49 CATARACT EXTRACTION STATUS, UNSPECIFIED EYE: Chronic | ICD-10-CM

## 2020-06-15 LAB
ALBUMIN SERPL ELPH-MCNC: 4.5 G/DL — SIGNIFICANT CHANGE UP (ref 3.3–5)
ALP SERPL-CCNC: 48 U/L — SIGNIFICANT CHANGE UP (ref 40–120)
ALT FLD-CCNC: 11 U/L — SIGNIFICANT CHANGE UP (ref 10–45)
ANION GAP SERPL CALC-SCNC: 12 MMOL/L — SIGNIFICANT CHANGE UP (ref 5–17)
ANION GAP SERPL CALC-SCNC: 12 MMOL/L — SIGNIFICANT CHANGE UP (ref 5–17)
APPEARANCE UR: CLEAR — SIGNIFICANT CHANGE UP
AST SERPL-CCNC: 19 U/L — SIGNIFICANT CHANGE UP (ref 10–40)
BACTERIA # UR AUTO: NEGATIVE — SIGNIFICANT CHANGE UP
BASE EXCESS BLDV CALC-SCNC: 2.7 MMOL/L — HIGH (ref -2–2)
BASOPHILS # BLD AUTO: 0.02 K/UL — SIGNIFICANT CHANGE UP (ref 0–0.2)
BASOPHILS NFR BLD AUTO: 0.2 % — SIGNIFICANT CHANGE UP (ref 0–2)
BILIRUB SERPL-MCNC: 0.5 MG/DL — SIGNIFICANT CHANGE UP (ref 0.2–1.2)
BILIRUB UR-MCNC: NEGATIVE — SIGNIFICANT CHANGE UP
BUN SERPL-MCNC: 6 MG/DL — LOW (ref 7–23)
BUN SERPL-MCNC: 7 MG/DL — SIGNIFICANT CHANGE UP (ref 7–23)
CA-I SERPL-SCNC: 1.18 MMOL/L — SIGNIFICANT CHANGE UP (ref 1.12–1.3)
CALCIUM SERPL-MCNC: 9.1 MG/DL — SIGNIFICANT CHANGE UP (ref 8.4–10.5)
CALCIUM SERPL-MCNC: 9.2 MG/DL — SIGNIFICANT CHANGE UP (ref 8.4–10.5)
CHLORIDE BLDV-SCNC: 93 MMOL/L — LOW (ref 96–108)
CHLORIDE SERPL-SCNC: 91 MMOL/L — LOW (ref 96–108)
CHLORIDE SERPL-SCNC: 96 MMOL/L — SIGNIFICANT CHANGE UP (ref 96–108)
CO2 BLDV-SCNC: 30 MMOL/L — SIGNIFICANT CHANGE UP (ref 22–30)
CO2 SERPL-SCNC: 26 MMOL/L — SIGNIFICANT CHANGE UP (ref 22–31)
CO2 SERPL-SCNC: 26 MMOL/L — SIGNIFICANT CHANGE UP (ref 22–31)
COLOR SPEC: SIGNIFICANT CHANGE UP
CREAT SERPL-MCNC: 0.64 MG/DL — SIGNIFICANT CHANGE UP (ref 0.5–1.3)
CREAT SERPL-MCNC: 0.67 MG/DL — SIGNIFICANT CHANGE UP (ref 0.5–1.3)
DIFF PNL FLD: ABNORMAL
EOSINOPHIL # BLD AUTO: 0.04 K/UL — SIGNIFICANT CHANGE UP (ref 0–0.5)
EOSINOPHIL NFR BLD AUTO: 0.4 % — SIGNIFICANT CHANGE UP (ref 0–6)
EPI CELLS # UR: 1 /HPF — SIGNIFICANT CHANGE UP
GAS PNL BLDV: 128 MMOL/L — LOW (ref 135–145)
GAS PNL BLDV: SIGNIFICANT CHANGE UP
GLUCOSE BLDV-MCNC: 91 MG/DL — SIGNIFICANT CHANGE UP (ref 70–99)
GLUCOSE SERPL-MCNC: 95 MG/DL — SIGNIFICANT CHANGE UP (ref 70–99)
GLUCOSE SERPL-MCNC: 97 MG/DL — SIGNIFICANT CHANGE UP (ref 70–99)
GLUCOSE UR QL: NEGATIVE — SIGNIFICANT CHANGE UP
HCO3 BLDV-SCNC: 28 MMOL/L — SIGNIFICANT CHANGE UP (ref 21–29)
HCT VFR BLD CALC: 38.3 % — SIGNIFICANT CHANGE UP (ref 34.5–45)
HCT VFR BLDA CALC: 41 % — SIGNIFICANT CHANGE UP (ref 39–50)
HGB BLD CALC-MCNC: 13.2 G/DL — SIGNIFICANT CHANGE UP (ref 11.5–15.5)
HGB BLD-MCNC: 12.5 G/DL — SIGNIFICANT CHANGE UP (ref 11.5–15.5)
HYALINE CASTS # UR AUTO: 0 /LPF — SIGNIFICANT CHANGE UP (ref 0–2)
IMM GRANULOCYTES NFR BLD AUTO: 0.5 % — SIGNIFICANT CHANGE UP (ref 0–1.5)
KETONES UR-MCNC: SIGNIFICANT CHANGE UP
LACTATE BLDV-MCNC: 1.3 MMOL/L — SIGNIFICANT CHANGE UP (ref 0.7–2)
LEUKOCYTE ESTERASE UR-ACNC: ABNORMAL
LIDOCAIN IGE QN: 48 U/L — SIGNIFICANT CHANGE UP (ref 7–60)
LYMPHOCYTES # BLD AUTO: 1.42 K/UL — SIGNIFICANT CHANGE UP (ref 1–3.3)
LYMPHOCYTES # BLD AUTO: 12.8 % — LOW (ref 13–44)
MCHC RBC-ENTMCNC: 28.3 PG — SIGNIFICANT CHANGE UP (ref 27–34)
MCHC RBC-ENTMCNC: 32.6 GM/DL — SIGNIFICANT CHANGE UP (ref 32–36)
MCV RBC AUTO: 86.8 FL — SIGNIFICANT CHANGE UP (ref 80–100)
MONOCYTES # BLD AUTO: 1.06 K/UL — HIGH (ref 0–0.9)
MONOCYTES NFR BLD AUTO: 9.6 % — SIGNIFICANT CHANGE UP (ref 2–14)
NEUTROPHILS # BLD AUTO: 8.49 K/UL — HIGH (ref 1.8–7.4)
NEUTROPHILS NFR BLD AUTO: 76.5 % — SIGNIFICANT CHANGE UP (ref 43–77)
NITRITE UR-MCNC: NEGATIVE — SIGNIFICANT CHANGE UP
NRBC # BLD: 0 /100 WBCS — SIGNIFICANT CHANGE UP (ref 0–0)
OTHER CELLS CSF MANUAL: 8 ML/DL — LOW (ref 18–22)
PCO2 BLDV: 51 MMHG — HIGH (ref 35–50)
PH BLDV: 7.37 — SIGNIFICANT CHANGE UP (ref 7.35–7.45)
PH UR: 6.5 — SIGNIFICANT CHANGE UP (ref 5–8)
PLATELET # BLD AUTO: 275 K/UL — SIGNIFICANT CHANGE UP (ref 150–400)
PO2 BLDV: 25 MMHG — SIGNIFICANT CHANGE UP (ref 25–45)
POTASSIUM BLDV-SCNC: 3.8 MMOL/L — SIGNIFICANT CHANGE UP (ref 3.5–5.3)
POTASSIUM SERPL-MCNC: 3.7 MMOL/L — SIGNIFICANT CHANGE UP (ref 3.5–5.3)
POTASSIUM SERPL-MCNC: 4.2 MMOL/L — SIGNIFICANT CHANGE UP (ref 3.5–5.3)
POTASSIUM SERPL-SCNC: 3.7 MMOL/L — SIGNIFICANT CHANGE UP (ref 3.5–5.3)
POTASSIUM SERPL-SCNC: 4.2 MMOL/L — SIGNIFICANT CHANGE UP (ref 3.5–5.3)
PROT SERPL-MCNC: 7.4 G/DL — SIGNIFICANT CHANGE UP (ref 6–8.3)
PROT UR-MCNC: NEGATIVE — SIGNIFICANT CHANGE UP
RBC # BLD: 4.41 M/UL — SIGNIFICANT CHANGE UP (ref 3.8–5.2)
RBC # FLD: 13.1 % — SIGNIFICANT CHANGE UP (ref 10.3–14.5)
RBC CASTS # UR COMP ASSIST: 0 /HPF — SIGNIFICANT CHANGE UP (ref 0–4)
SAO2 % BLDV: 43 % — LOW (ref 67–88)
SODIUM SERPL-SCNC: 129 MMOL/L — LOW (ref 135–145)
SODIUM SERPL-SCNC: 134 MMOL/L — LOW (ref 135–145)
SP GR SPEC: 1 — LOW (ref 1.01–1.02)
UROBILINOGEN FLD QL: NEGATIVE — SIGNIFICANT CHANGE UP
WBC # BLD: 11.08 K/UL — HIGH (ref 3.8–10.5)
WBC # FLD AUTO: 11.08 K/UL — HIGH (ref 3.8–10.5)
WBC UR QL: 7 /HPF — HIGH (ref 0–5)

## 2020-06-15 PROCEDURE — 82947 ASSAY GLUCOSE BLOOD QUANT: CPT

## 2020-06-15 PROCEDURE — 82330 ASSAY OF CALCIUM: CPT

## 2020-06-15 PROCEDURE — 82435 ASSAY OF BLOOD CHLORIDE: CPT

## 2020-06-15 PROCEDURE — 99284 EMERGENCY DEPT VISIT MOD MDM: CPT | Mod: GC

## 2020-06-15 PROCEDURE — 80048 BASIC METABOLIC PNL TOTAL CA: CPT

## 2020-06-15 PROCEDURE — 82565 ASSAY OF CREATININE: CPT

## 2020-06-15 PROCEDURE — 80053 COMPREHEN METABOLIC PANEL: CPT

## 2020-06-15 PROCEDURE — 83690 ASSAY OF LIPASE: CPT

## 2020-06-15 PROCEDURE — 83605 ASSAY OF LACTIC ACID: CPT

## 2020-06-15 PROCEDURE — 99284 EMERGENCY DEPT VISIT MOD MDM: CPT | Mod: 25

## 2020-06-15 PROCEDURE — 81001 URINALYSIS AUTO W/SCOPE: CPT

## 2020-06-15 PROCEDURE — 82803 BLOOD GASES ANY COMBINATION: CPT

## 2020-06-15 PROCEDURE — 74174 CTA ABD&PLVS W/CONTRAST: CPT | Mod: 26

## 2020-06-15 PROCEDURE — 84295 ASSAY OF SERUM SODIUM: CPT

## 2020-06-15 PROCEDURE — 74174 CTA ABD&PLVS W/CONTRAST: CPT

## 2020-06-15 PROCEDURE — 85014 HEMATOCRIT: CPT

## 2020-06-15 PROCEDURE — 85027 COMPLETE CBC AUTOMATED: CPT

## 2020-06-15 PROCEDURE — 84132 ASSAY OF SERUM POTASSIUM: CPT

## 2020-06-15 RX ORDER — SODIUM CHLORIDE 9 MG/ML
500 INJECTION INTRAMUSCULAR; INTRAVENOUS; SUBCUTANEOUS ONCE
Refills: 0 | Status: COMPLETED | OUTPATIENT
Start: 2020-06-15 | End: 2020-06-15

## 2020-06-15 RX ADMIN — SODIUM CHLORIDE 500 MILLILITER(S): 9 INJECTION INTRAMUSCULAR; INTRAVENOUS; SUBCUTANEOUS at 14:38

## 2020-06-15 NOTE — ED CLERICAL - NS ED CLERK NOTE PRE-ARRIVAL INFORMATION; ADDITIONAL PRE-ARRIVAL INFORMATION
CC/Reason For referral: c/o abdominal pain, decreased po intake, hx colitis  Preferred Consultant(if applicable): Dr Coombs GI knows pt  Who admits for you (if needed):  Do you have documents you would like to fax over? NO  Would you still like to speak to an ED attending?  YES

## 2020-06-15 NOTE — ED PROVIDER NOTE - PROGRESS NOTE DETAILS
Case discussed with Dr. Figueroa, states patient has chronic hyponatremia and should hold her diuretic, agrees with plan to discharge, recommends her follow up with GI

## 2020-06-15 NOTE — ED PROVIDER NOTE - CLINICAL SUMMARY MEDICAL DECISION MAKING FREE TEXT BOX
88 y/o female with hx of HTN, GERD is presenting to the ED c/o abdominal pain and diarrhea. Vitals stable, exam with mild periumbilical tenderness. ?colitis vs less likely ischemic bowel but given hx of possible near syncope will obtain CTA abd/pelvis to r/o mesenteric ischemia. Will check labs including cbc, cmp, lipase, vbg. Reassess following imaging. 86 y/o female with hx of HTN, GERD is presenting to the ED c/o abdominal pain and diarrhea. Vitals stable, exam with mild periumbilical tenderness. ?colitis vs less likely ischemic bowel but given hx of possible near syncope will obtain CTA abd/pelvis to r/o mesenteric ischemia. Will check labs including cbc, cmp, lipase, vbg. Reassess following imaging.  --BMM

## 2020-06-15 NOTE — ED PROVIDER NOTE - NS ED ROS FT
CONST: no fevers, no chills  EYES: no pain, no vision changes  ENT: no sore throat, no ear pain, no change in hearing  CV: no chest pain, no leg swelling  RESP: no shortness of breath, no cough  ABD: + abdominal pain, no nausea, no vomiting, + diarrhea  : no dysuria, no flank pain, no hematuria  MSK: no back pain, no extremity pain  NEURO: no headache or additional neurologic complaints  HEME: no easy bleeding  SKIN:  no rash

## 2020-06-15 NOTE — ED PROVIDER NOTE - PATIENT PORTAL LINK FT
You can access the FollowMyHealth Patient Portal offered by [facility] by registering at the following website: [email or web address]. By joining [email or web address]s FollowMyHealth portal, you will also be able to view your health information using other applications (apps) compatible with our system.

## 2020-06-15 NOTE — ED ADULT NURSE NOTE - OBJECTIVE STATEMENT
88y/o female presented to the ED from home complaining of diarrhea. A&Ox3, ambulatory. PMH- HTN, GERD, polymyalgia.   Patient reports... Abdomen is soft, nondistended, RLQ/periumbilical  tenderness upon palpation. Denies recent use of abx. 88y/o female presented to the ED from home complaining of diarrhea. A&Ox3, ambulatory. PMH- HTN, GERD, polymyalgia. Patient reports multiple episodes of non bloody diarrhea x1 day, associated with abdominal cramping/pain. Vomiting. Patient states that she has near syncopal episodes when having diarrhea. Abdomen is soft, nondistended, RLQ/periumbilical  tenderness upon palpation. Denies recent use of abx. Denies chest pain, SOB, fever, chills, headache dizziness. Denies pain at this time.

## 2020-06-15 NOTE — ED PROVIDER NOTE - OBJECTIVE STATEMENT
88 y/o female is presenting to the ED c/o abdominal pain and diarrhea. Reports two days ago she had a severe episode of lower abdominal pain and experienced a very large episode of diarrhea. She felt near syncopal at that time but did not lose consciousness. Today she also had a similar episode of abdominal pain and diarrhea and talked with Dr. Figueroa who recommended her come to the ED. She reports she had similar sx 9 months ago when she experienced colitis. No N/V, dysuria, hematuria, fever, chills, CP, SOB. 86 y/o female w/ hx of HTN, GERD,  is presenting to the ED c/o abdominal pain and diarrhea. Reports two days ago she had a severe episode of lower abdominal pain and experienced a very large episode of diarrhea. She felt near syncopal at that time but did not lose consciousness. Today she also had a similar episode of abdominal pain and diarrhea and talked with Dr. Figueroa who recommended her come to the ED. She reports she had similar sx 9 months ago when she experienced colitis. No N/V, dysuria, hematuria, fever, chills, CP, SOB.

## 2020-06-15 NOTE — ED PROVIDER NOTE - PHYSICAL EXAMINATION
GENERAL: Awake, alert, NAD  HEENT: NC/AT, moist mucous membranes, PERRL, EOMI  LUNGS: CTAB, no wheezes or crackles   CARDIAC: RRR, no m/r/g  ABDOMEN: Soft, normal BS, mild periumbilical abdominal tenderness, non distended, no rebound, no guarding  BACK: No midline spinal tenderness, no CVA tenderness  EXT: No edema, no calf tenderness, 2+ DP pulses bilaterally, no deformities.  NEURO: A&Ox3. Moving all extremities.  SKIN: Warm and dry. No rash.  PSYCH: Normal affect.

## 2020-06-15 NOTE — ED PROVIDER NOTE - NSFOLLOWUPINSTRUCTIONS_ED_ALL_ED_FT
Please hold your diuretic for 4-5 days. Call Dr. Coombs tomorrow to schedule an appointment.     Many things can cause abdominal pain. Many times, abdominal pain is not caused by a disease and will improve without treatment. Your health care provider will do a physical exam to determine if there is a dangerous cause of your pain; blood tests and imaging may help determine the cause of your pain. However, in many cases, no cause may be found and you may need further testing as an outpatient. Monitor your abdominal pain for any changes.     SEEK IMMEDIATE MEDICAL CARE IF YOU HAVE ANY OF THE FOLLOWING SYMPTOMS: worsening abdominal pain, uncontrollable vomiting, profuse diarrhea, inability to have bowel movements or pass gas, black or bloody stools, fever accompanying chest pain or back pain, or fainting. These symptoms may represent a serious problem that is an emergency. Do not wait to see if the symptoms will go away. Get medical help right away. Call 911 and do not drive yourself to the hospital.

## 2020-06-15 NOTE — ED ADULT NURSE REASSESSMENT NOTE - NS ED NURSE REASSESS COMMENT FT1
Patient resting comfortably, patient ambulatory to the bathroom. Awaking CT scan at this time. Will continue to monitor.

## 2020-08-06 ENCOUNTER — TRANSCRIPTION ENCOUNTER (OUTPATIENT)
Age: 85
End: 2020-08-06

## 2021-03-03 ENCOUNTER — INPATIENT (INPATIENT)
Facility: HOSPITAL | Age: 86
LOS: 1 days | Discharge: HOME | DRG: 149 | End: 2021-03-05
Attending: INTERNAL MEDICINE | Admitting: INTERNAL MEDICINE
Payer: MEDICARE

## 2021-03-03 VITALS
OXYGEN SATURATION: 98 % | WEIGHT: 151.9 LBS | RESPIRATION RATE: 18 BRPM | DIASTOLIC BLOOD PRESSURE: 70 MMHG | HEIGHT: 60 IN | SYSTOLIC BLOOD PRESSURE: 215 MMHG | TEMPERATURE: 97 F | HEART RATE: 76 BPM

## 2021-03-03 DIAGNOSIS — R42 DIZZINESS AND GIDDINESS: ICD-10-CM

## 2021-03-03 DIAGNOSIS — Z02.9 ENCOUNTER FOR ADMINISTRATIVE EXAMINATIONS, UNSPECIFIED: ICD-10-CM

## 2021-03-03 DIAGNOSIS — F41.9 ANXIETY DISORDER, UNSPECIFIED: ICD-10-CM

## 2021-03-03 DIAGNOSIS — M35.3 POLYMYALGIA RHEUMATICA: ICD-10-CM

## 2021-03-03 DIAGNOSIS — Z29.9 ENCOUNTER FOR PROPHYLACTIC MEASURES, UNSPECIFIED: ICD-10-CM

## 2021-03-03 DIAGNOSIS — I77.9 DISORDER OF ARTERIES AND ARTERIOLES, UNSPECIFIED: ICD-10-CM

## 2021-03-03 DIAGNOSIS — Z98.49 CATARACT EXTRACTION STATUS, UNSPECIFIED EYE: Chronic | ICD-10-CM

## 2021-03-03 DIAGNOSIS — I16.0 HYPERTENSIVE URGENCY: ICD-10-CM

## 2021-03-03 LAB
ALBUMIN SERPL ELPH-MCNC: 4.1 G/DL — SIGNIFICANT CHANGE UP (ref 3.3–5)
ALP SERPL-CCNC: 59 U/L — SIGNIFICANT CHANGE UP (ref 40–120)
ALT FLD-CCNC: 14 U/L — SIGNIFICANT CHANGE UP (ref 10–45)
ANION GAP SERPL CALC-SCNC: 10 MMOL/L — SIGNIFICANT CHANGE UP (ref 5–17)
APTT BLD: 30.2 SEC — SIGNIFICANT CHANGE UP (ref 27.5–35.5)
AST SERPL-CCNC: 20 U/L — SIGNIFICANT CHANGE UP (ref 10–40)
BASE EXCESS BLDV CALC-SCNC: 5.3 MMOL/L — HIGH (ref -2–2)
BASOPHILS # BLD AUTO: 0.02 K/UL — SIGNIFICANT CHANGE UP (ref 0–0.2)
BASOPHILS NFR BLD AUTO: 0.2 % — SIGNIFICANT CHANGE UP (ref 0–2)
BILIRUB SERPL-MCNC: 0.2 MG/DL — SIGNIFICANT CHANGE UP (ref 0.2–1.2)
BUN SERPL-MCNC: 12 MG/DL — SIGNIFICANT CHANGE UP (ref 7–23)
CALCIUM SERPL-MCNC: 9.7 MG/DL — SIGNIFICANT CHANGE UP (ref 8.4–10.5)
CHLORIDE SERPL-SCNC: 97 MMOL/L — SIGNIFICANT CHANGE UP (ref 96–108)
CO2 BLDV-SCNC: 34 MMOL/L — HIGH (ref 22–30)
CO2 SERPL-SCNC: 30 MMOL/L — SIGNIFICANT CHANGE UP (ref 22–31)
CREAT SERPL-MCNC: 0.79 MG/DL — SIGNIFICANT CHANGE UP (ref 0.5–1.3)
EOSINOPHIL # BLD AUTO: 0.1 K/UL — SIGNIFICANT CHANGE UP (ref 0–0.5)
EOSINOPHIL NFR BLD AUTO: 0.9 % — SIGNIFICANT CHANGE UP (ref 0–6)
GLUCOSE SERPL-MCNC: 98 MG/DL — SIGNIFICANT CHANGE UP (ref 70–99)
HCO3 BLDV-SCNC: 32 MMOL/L — HIGH (ref 21–29)
HCT VFR BLD CALC: 40.5 % — SIGNIFICANT CHANGE UP (ref 34.5–45)
HGB BLD-MCNC: 13.2 G/DL — SIGNIFICANT CHANGE UP (ref 11.5–15.5)
IMM GRANULOCYTES NFR BLD AUTO: 0.4 % — SIGNIFICANT CHANGE UP (ref 0–1.5)
INR BLD: 1 RATIO — SIGNIFICANT CHANGE UP (ref 0.88–1.16)
LYMPHOCYTES # BLD AUTO: 19.6 % — SIGNIFICANT CHANGE UP (ref 13–44)
LYMPHOCYTES # BLD AUTO: 2.11 K/UL — SIGNIFICANT CHANGE UP (ref 1–3.3)
MCHC RBC-ENTMCNC: 28.3 PG — SIGNIFICANT CHANGE UP (ref 27–34)
MCHC RBC-ENTMCNC: 32.6 GM/DL — SIGNIFICANT CHANGE UP (ref 32–36)
MCV RBC AUTO: 86.9 FL — SIGNIFICANT CHANGE UP (ref 80–100)
MONOCYTES # BLD AUTO: 1.02 K/UL — HIGH (ref 0–0.9)
MONOCYTES NFR BLD AUTO: 9.5 % — SIGNIFICANT CHANGE UP (ref 2–14)
NEUTROPHILS # BLD AUTO: 7.47 K/UL — HIGH (ref 1.8–7.4)
NEUTROPHILS NFR BLD AUTO: 69.4 % — SIGNIFICANT CHANGE UP (ref 43–77)
NRBC # BLD: 0 /100 WBCS — SIGNIFICANT CHANGE UP (ref 0–0)
PCO2 BLDV: 58 MMHG — HIGH (ref 35–50)
PH BLDV: 7.36 — SIGNIFICANT CHANGE UP (ref 7.35–7.45)
PLATELET # BLD AUTO: 264 K/UL — SIGNIFICANT CHANGE UP (ref 150–400)
PO2 BLDV: 30 MMHG — SIGNIFICANT CHANGE UP (ref 25–45)
POTASSIUM SERPL-MCNC: 3.7 MMOL/L — SIGNIFICANT CHANGE UP (ref 3.5–5.3)
POTASSIUM SERPL-SCNC: 3.7 MMOL/L — SIGNIFICANT CHANGE UP (ref 3.5–5.3)
PROT SERPL-MCNC: 7.2 G/DL — SIGNIFICANT CHANGE UP (ref 6–8.3)
PROTHROM AB SERPL-ACNC: 12 SEC — SIGNIFICANT CHANGE UP (ref 10.6–13.6)
RBC # BLD: 4.66 M/UL — SIGNIFICANT CHANGE UP (ref 3.8–5.2)
RBC # FLD: 13.2 % — SIGNIFICANT CHANGE UP (ref 10.3–14.5)
SAO2 % BLDV: 49 % — LOW (ref 67–88)
SARS-COV-2 RNA SPEC QL NAA+PROBE: SIGNIFICANT CHANGE UP
SODIUM SERPL-SCNC: 137 MMOL/L — SIGNIFICANT CHANGE UP (ref 135–145)
TROPONIN T, HIGH SENSITIVITY RESULT: 23 NG/L — SIGNIFICANT CHANGE UP (ref 0–51)
TROPONIN T, HIGH SENSITIVITY RESULT: 24 NG/L — SIGNIFICANT CHANGE UP (ref 0–51)
WBC # BLD: 10.76 K/UL — HIGH (ref 3.8–10.5)
WBC # FLD AUTO: 10.76 K/UL — HIGH (ref 3.8–10.5)

## 2021-03-03 PROCEDURE — 70498 CT ANGIOGRAPHY NECK: CPT | Mod: 26,MH

## 2021-03-03 PROCEDURE — 70450 CT HEAD/BRAIN W/O DYE: CPT | Mod: 26,59,59,MA

## 2021-03-03 PROCEDURE — 70496 CT ANGIOGRAPHY HEAD: CPT | Mod: 26

## 2021-03-03 PROCEDURE — 71045 X-RAY EXAM CHEST 1 VIEW: CPT | Mod: 26

## 2021-03-03 PROCEDURE — 99285 EMERGENCY DEPT VISIT HI MDM: CPT

## 2021-03-03 PROCEDURE — 93010 ELECTROCARDIOGRAM REPORT: CPT

## 2021-03-03 PROCEDURE — 99223 1ST HOSP IP/OBS HIGH 75: CPT

## 2021-03-03 RX ORDER — CHOLECALCIFEROL (VITAMIN D3) 125 MCG
1000 CAPSULE ORAL DAILY
Refills: 0 | Status: DISCONTINUED | OUTPATIENT
Start: 2021-03-03 | End: 2021-03-05

## 2021-03-03 RX ORDER — ACETAMINOPHEN 500 MG
2 TABLET ORAL
Qty: 0 | Refills: 0 | DISCHARGE

## 2021-03-03 RX ORDER — LOSARTAN POTASSIUM 100 MG/1
50 TABLET, FILM COATED ORAL DAILY
Refills: 0 | Status: DISCONTINUED | OUTPATIENT
Start: 2021-03-04 | End: 2021-03-05

## 2021-03-03 RX ORDER — METOPROLOL TARTRATE 50 MG
50 TABLET ORAL AT BEDTIME
Refills: 0 | Status: DISCONTINUED | OUTPATIENT
Start: 2021-03-03 | End: 2021-03-05

## 2021-03-03 RX ORDER — ENOXAPARIN SODIUM 100 MG/ML
40 INJECTION SUBCUTANEOUS DAILY
Refills: 0 | Status: DISCONTINUED | OUTPATIENT
Start: 2021-03-03 | End: 2021-03-05

## 2021-03-03 RX ORDER — ASPIRIN/CALCIUM CARB/MAGNESIUM 324 MG
81 TABLET ORAL DAILY
Refills: 0 | Status: DISCONTINUED | OUTPATIENT
Start: 2021-03-04 | End: 2021-03-05

## 2021-03-03 RX ORDER — VERAPAMIL HCL 240 MG
240 CAPSULE, EXTENDED RELEASE PELLETS 24 HR ORAL DAILY
Refills: 0 | Status: DISCONTINUED | OUTPATIENT
Start: 2021-03-04 | End: 2021-03-05

## 2021-03-03 RX ADMIN — Medication 50 MILLIGRAM(S): at 20:22

## 2021-03-03 RX ADMIN — ENOXAPARIN SODIUM 40 MILLIGRAM(S): 100 INJECTION SUBCUTANEOUS at 17:26

## 2021-03-03 RX ADMIN — Medication 3 MILLIGRAM(S): at 17:19

## 2021-03-03 RX ADMIN — Medication 1000 UNIT(S): at 17:26

## 2021-03-03 NOTE — ED ADULT TRIAGE NOTE - CHIEF COMPLAINT QUOTE
Feeling off balance, unsteady gait since 0900  Also had a similar episode with feeling flushed, lightheaded and off balance on Monday that resolved

## 2021-03-03 NOTE — H&P ADULT - PROBLEM SELECTOR PLAN 6
Transitions of Care Status:  1.  Name of PCP:   Isaac Figueroa MD  2.  PCP Contacted on Admission: [ x] Y    [ ] N  by the ER.  3.  PCP contacted at Discharge: [ ] Y    [ ] N    [ ] N/A  4.  Post-Discharge Appointment Date and Location:  5.  Summary of Handoff given to PCP:

## 2021-03-03 NOTE — STROKE CODE NOTE - DISPOSITION
Other Otolaryngologist Procedure Text (C): After obtaining clear surgical margins the patient was sent to otolaryngology for surgical repair.  The patient understands they will receive post-surgical care and follow-up from the referring physician's office.

## 2021-03-03 NOTE — INPATIENT CERTIFICATION FOR MEDICARE PATIENTS - RISKS OF ADVERSE EVENTS
Concern for cardiopulmonary deterioration/Concern for neurologic deterioration/Concern for delay in diagnosis and treatment

## 2021-03-03 NOTE — CONSULT NOTE ADULT - ATTENDING COMMENTS
code stroke called and neurology emergently assessed patient. Briefly 87 yo RH F HTN, GERD p/w lightheaded sensation. CTH unremarkable, CTA H/N with L carotid bulb 50-69% stenosis.   - ASA 81mg, lipitor 40mg  - check A1c and lipids  - CD  - MRI brain  - PT/OT  - check orthostatics

## 2021-03-03 NOTE — CONSULT NOTE ADULT - MINUTES
Telephone Encounter by Tabitha Rolon RN, BSN at 01/09/18 01:29 PM     Author:  Tabitha Rolon RN, BSN Service:  (none) Author Type:  Registered Nurse     Filed:  01/09/18 01:34 PM Encounter Date:  1/9/2018 Status:  Signed     :  Tabitha Rolon RN, BSN (Registered Nurse)            Patient complains of productive cough - clear and states that Dr. Bhatt did not give her cough medicine.    This Nurse checked and notified patient that Dr. Bhatt did a refill for Guaifenesin with Codeine on 1/2/18, and [facility].    Patient can be reached at [phone].[BS1.1M]    3-way repeat back was completed on the information provided by the patient for verification and accuracy. Patient was in agreement and denied further questions or concerns.[BS1.1T]    Forward to [facility] clinical [place]. Please make sure that medication ordered for patient and give a call, thank you.[BS1.1M]      Revision History        User Key Date/Time User Provider Type Action    > BS1.1 01/09/18 01:34 PM Tabitha Rolon, RN, BSN Registered Nurse Sign    M - Manual, T - Template             54

## 2021-03-03 NOTE — ED PROVIDER NOTE - ATTENDING CONTRIBUTION TO CARE
87 yo female p/w unsteady gait.  vertigo persistent here without focal motor weakness.  CT head without acute infarct.  unclear etiology but cannot safely ambulate -- will admit for consideration of MRI head, PT eval and continued neuro input.

## 2021-03-03 NOTE — H&P ADULT - NSHPREVIEWOFSYSTEMS_GEN_ALL_CORE
NO fever, no chills, no rigors.  NO chest pain/pressure.  No palpitations.  No HA, no focal weakness.  NO abdominal pain, no red blood per rectum or melena.  No back pain, no tearing back pain.    No rash.  NO joint pain.  NO breast symptoms.  No vaginal bleeding.  No thyroid symptoms.  NO SI/HI.

## 2021-03-03 NOTE — CONSULT NOTE ADULT - SUBJECTIVE AND OBJECTIVE BOX
HPI:    Patient is an 87 yo RH lady who presents to the facility on the afternoon of 3/3/21 regarding complaints of lightheadedness and difficulty ambulating that has persisted since 09:00 this morning. 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 and attends vestibular therapy, HTN, GERD. Patient reports she suffered a similar episode earlier in the week, BP noted to be elevated at that time by symptoms spontaneously resolved after about 30 minutes. Currently patient complaints of an "off feeling in her head" and lightheadedness which is exacerbated when standing up and produces difficultly ambulating secondary to symptoms. Patient currently denies speech/visual changes, n/v/d, abd pain, cp, sob, head trauma, headache, numbness/tingling. Code Stroke called on arrival, NIHSS of 0, BP of 210/78. CT Head noncontrast: Mild volume loss, microvascular disease, no acute hemorrhage or midline shift. CTA Head  Patent intracranial circulation without flow limiting stenosis. CTA NECK: Calcified plaque with suspected 50-69% stenosis in the left carotid bulb. Neurology consulted via Code Stroke      (Stroke only)  NIHSS: 0  MRS: 0  ICH: 0      PAST MEDICAL & SURGICAL HISTORY:  Hypertension    Gastroesophageal reflux disease, esophagitis presence not specified    Hypertension    History of cataract extraction, unspecified laterality      FAMILY HISTORY:  Family history of acute myocardial infarction      SOCIAL HISTORY:   T/E/D:   Occupation:   Lives with:     MEDICATIONS (HOME):  Home Medications:  aspirin 81 mg oral delayed release tablet: 1 tab(s) orally once a day (03 Mar 2021 16:04)  losartan 50 mg oral tablet: 1 tab(s) orally once a day (03 Mar 2021 16:04)  metoprolol succinate 50 mg oral tablet, extended release: 1 tab(s) orally once a day (03 Mar 2021 16:04)  predniSONE 1 mg oral tablet: 3 tab(s) orally once a day (03 Mar 2021 16:04)  PriLOSEC 20 mg oral delayed release capsule: 1 cap(s) orally once a day (03 Mar 2021 16:04)  Refresh ophthalmic solution: 1 drop(s) to each affected eye , As Needed (03 Mar 2021 16:04)  sodium chloride 1 g oral tablet: 1 tab(s) orally once a day (03 Mar 2021 16:04)  torsemide:  (03 Mar 2021 16:04)  verapamil 240 mg/24 hours oral capsule, extended release: 1 cap(s) orally once a day (03 Mar 2021 16:04)  Vitamin D3 1000 intl units oral tablet: 1 tab(s) orally once a day (03 Mar 2021 16:04)  Xanax:  (03 Mar 2021 16:04)    MEDICATIONS  (STANDING):    MEDICATIONS  (PRN):    ALLERGIES/INTOLERANCES:  Allergies  adhesives (Other)  latex (Rash)  penicillins (Hives)    Intolerances    VITALS & EXAMINATION:  Vital Signs Last 24 Hrs  T(C): 36.6 (03 Mar 2021 16:23), Max: 36.8 (03 Mar 2021 14:28)  T(F): 97.8 (03 Mar 2021 16:23), Max: 98.2 (03 Mar 2021 14:28)  HR: 78 (03 Mar 2021 16:23) (76 - 84)  BP: 165/75 (03 Mar 2021 16:23) (140/58 - 215/70)  BP(mean): --  RR: 15 (03 Mar 2021 16:23) (13 - 20)  SpO2: 100% (03 Mar 2021 16:23) (98% - 100%)    General:  Constitutional: Female, appears stated age, in no apparent distress including pain  Head: Normocephalic & atraumatic.  Extremities: No cyanosis, clubbing, or edema.  Skin: No rashes, bruising, or discoloration.       Neurological (>12):  MS: Awake, alert, oriented to person, place, situation, time. Normal affect. Follows all commands including cross commands No apraxia, no neglect, no    Language: Speech is clear, fluent with good repetition & comprehension (able to name objects: pen, watch, glove)    CNs: PERRLA (R = 3mm, L = 3mm). VFF. EOMI no nystagmus, no diplopia. V1-3 intact to LT/pinprick, well developed masseter muscles b/l. No facial asymmetry b/l, full eye closure strength b/l. Hearing grossly normal (rubbing fingers) b/l. Symmetric palate elevation in midline. Gag reflex deferred. Head turning & shoulder shrug intact b/l. Tongue midline, normal movements, no atrophy.    Motor: Normal muscle bulk & tone. No noticeable tremor or seizure. No pronator drift.              Deltoid	Biceps	Triceps	Wrist	   R	5	5	5	5	5 	  L	5	5	5	5	5    	H-Flex	H-Ext	K-Flex	K-Ext	D-Flex	P-Flex  R	5	5	5	5	5	5 	   L	5	5	5	5	5	5	     Sensation: Intact to LT/PP/ b/l throughout.     Cortical: Extinction on DSS (neglect): none    Reflexes:              Biceps(C5)       BR(C6)         Patellar(L4)      Plantar Resp  R	2	          2	                    2		    	Up   L	2	          2	                    2		    	Down     Coordination: intact rapid-alt movements. No dysmetria to FTN    Gait: Abnormal Romberg. No postural instability. Wide based stance and notably ataxic gait    LABORATORY:  CBC                       13.2   10.76 )-----------( 264      ( 03 Mar 2021 14:07 )             40.5     Chem 03-03    137  |  97  |  12  ----------------------------<  98  3.7   |  30  |  0.79    Ca    9.7      03 Mar 2021 14:07    TPro  7.2  /  Alb  4.1  /  TBili  0.2  /  DBili  x   /  AST  20  /  ALT  14  /  AlkPhos  59  03-03    LFTs LIVER FUNCTIONS - ( 03 Mar 2021 14:07 )  Alb: 4.1 g/dL / Pro: 7.2 g/dL / ALK PHOS: 59 U/L / ALT: 14 U/L / AST: 20 U/L / GGT: x           Coagulopathy PT/INR - ( 03 Mar 2021 14:07 )   PT: 12.0 sec;   INR: 1.00 ratio         PTT - ( 03 Mar 2021 14:07 )  PTT:30.2 sec      Radiology (XR, CT, MR, U/S, TTE/MATTHEW):    CT Brain Stroke Protocol (03.03.21 @ 14:17)     FINDINGS:    Mild prominence of the sulci and ventricles are consistent with age-appropriate volume loss.  There are hemispheric white matter areas of low attenuation which are nonspecific but likely related to sequelae of microvascular disease.  There is no intraparenchymal hematoma, mass effect or midline shift.  No abnormal extra-axial fluid collections are present.  The basal cisterns are patent.    The calvarium is intact.  Bilateral cataract surgery.  Visualized paranasal sinuses appear free of acute disease.  Mastoid air cells are clear.    IMPRESSION:    Volume loss with microvascular disease, no acute hemorrhage or midline shift. If symptoms persist consider follow-up head CT or MR if no contraindications.      CT Angio Head w/ IV Cont (03.03.21 @ 15:13)     IMPRESSION:      HEAD CT:    HEAD CT: Mild volume loss, microvascular disease, no acute hemorrhage or midline shift.    If symptoms persist consider follow up head CT or MRI, MRA  if no contraindication.    CTA COW:  Patent intracranial circulation without flow limiting stenosis.    CTA NECK: Calcified plaque with suspected 50-69% stenosis in the left carotid bulb.         HPI:    Patient is an 89 yo RH lady who presents to the facility on the afternoon of 3/3/21 regarding complaints of lightheadedness and difficulty ambulating that has persisted since 09:00 this morning. 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 and attends vestibular therapy, HTN, GERD. Patient reports she suffered a similar episode earlier in the week, BP noted to be elevated at that time by symptoms spontaneously resolved after about 30 minutes. Currently patient complaints of an "off feeling in her head" and lightheadedness which is exacerbated when standing up and produces difficultly ambulating secondary to symptoms. Patient currently denies speech/visual changes, n/v/d, abd pain, cp, sob, head trauma, headache, numbness/tingling. Code Stroke called on arrival, NIHSS of 0, BP of 210/78. CT Head noncontrast: Mild volume loss, microvascular disease, no acute hemorrhage or midline shift. CTA Head  Patent intracranial circulation without flow limiting stenosis. CTA NECK: Calcified plaque with suspected 50-69% stenosis in the left carotid bulb. Neurology consulted via Code Stroke      (Stroke only)  NIHSS: 0  MRS: 0  ICH: 0      PAST MEDICAL & SURGICAL HISTORY:  Hypertension    Gastroesophageal reflux disease, esophagitis presence not specified    Hypertension    History of cataract extraction, unspecified laterality      FAMILY HISTORY:  Family history of acute myocardial infarction      SOCIAL HISTORY:   T/E/D:   Occupation:   Lives with:     MEDICATIONS (HOME):  Home Medications:  aspirin 81 mg oral delayed release tablet: 1 tab(s) orally once a day (03 Mar 2021 16:04)  losartan 50 mg oral tablet: 1 tab(s) orally once a day (03 Mar 2021 16:04)  metoprolol succinate 50 mg oral tablet, extended release: 1 tab(s) orally once a day (03 Mar 2021 16:04)  predniSONE 1 mg oral tablet: 3 tab(s) orally once a day (03 Mar 2021 16:04)  PriLOSEC 20 mg oral delayed release capsule: 1 cap(s) orally once a day (03 Mar 2021 16:04)  Refresh ophthalmic solution: 1 drop(s) to each affected eye , As Needed (03 Mar 2021 16:04)  sodium chloride 1 g oral tablet: 1 tab(s) orally once a day (03 Mar 2021 16:04)  torsemide:  (03 Mar 2021 16:04)  verapamil 240 mg/24 hours oral capsule, extended release: 1 cap(s) orally once a day (03 Mar 2021 16:04)  Vitamin D3 1000 intl units oral tablet: 1 tab(s) orally once a day (03 Mar 2021 16:04)  Xanax:  (03 Mar 2021 16:04)    MEDICATIONS  (STANDING):    MEDICATIONS  (PRN):    ALLERGIES/INTOLERANCES:  Allergies  adhesives (Other)  latex (Rash)  penicillins (Hives)    Intolerances    VITALS & EXAMINATION:  Vital Signs Last 24 Hrs  T(C): 36.6 (03 Mar 2021 16:23), Max: 36.8 (03 Mar 2021 14:28)  T(F): 97.8 (03 Mar 2021 16:23), Max: 98.2 (03 Mar 2021 14:28)  HR: 78 (03 Mar 2021 16:23) (76 - 84)  BP: 165/75 (03 Mar 2021 16:23) (140/58 - 215/70)  BP(mean): --  RR: 15 (03 Mar 2021 16:23) (13 - 20)  SpO2: 100% (03 Mar 2021 16:23) (98% - 100%)    General:  Constitutional: Female, appears stated age, in no apparent distress including pain  Head: Normocephalic & atraumatic.  Extremities: No cyanosis, clubbing, or edema.  Skin: No rashes, bruising, or discoloration.       Neurological (>12):  MS: Awake, alert, oriented to person, place, situation, time. Normal affect. Follows all commands including cross commands No apraxia, no neglect, no    Language: Speech is clear, fluent with good repetition & comprehension (able to name objects: pen, watch, glove)    CNs: PERRLA (R = 3mm, L = 3mm). VFF. EOMI no nystagmus, no diplopia. V1-3 intact to LT/pinprick, well developed masseter muscles b/l. No facial asymmetry b/l, full eye closure strength b/l. Hearing grossly normal (rubbing fingers) b/l. Symmetric palate elevation in midline. Gag reflex deferred. Head turning & shoulder shrug intact b/l. Tongue midline, normal movements, no atrophy.    Motor: Normal muscle bulk & tone. No noticeable tremor or seizure. No pronator drift.              Deltoid	Biceps	Triceps	Wrist	   R	5	5	5	5	5 	  L	5	5	5	5	5    	H-Flex	H-Ext	K-Flex	K-Ext	D-Flex	P-Flex  R	5	5	5	5	5	5 	   L	5	5	5	5	5	5	     Sensation: Intact to LT/PP/ b/l throughout.     Cortical: Extinction on DSS (neglect): none    Reflexes:              Biceps(C5)       BR(C6)         Patellar(L4)      Plantar Resp  R	2	          2	                    2		    	Down   L	2	          2	                    2		    	Down     Coordination: intact rapid-alt movements. No dysmetria to FTN    Gait: Abnormal Romberg. No postural instability. Wide based stance and notably ataxic gait    LABORATORY:  CBC                       13.2   10.76 )-----------( 264      ( 03 Mar 2021 14:07 )             40.5     Chem 03-03    137  |  97  |  12  ----------------------------<  98  3.7   |  30  |  0.79    Ca    9.7      03 Mar 2021 14:07    TPro  7.2  /  Alb  4.1  /  TBili  0.2  /  DBili  x   /  AST  20  /  ALT  14  /  AlkPhos  59  03-03    LFTs LIVER FUNCTIONS - ( 03 Mar 2021 14:07 )  Alb: 4.1 g/dL / Pro: 7.2 g/dL / ALK PHOS: 59 U/L / ALT: 14 U/L / AST: 20 U/L / GGT: x           Coagulopathy PT/INR - ( 03 Mar 2021 14:07 )   PT: 12.0 sec;   INR: 1.00 ratio         PTT - ( 03 Mar 2021 14:07 )  PTT:30.2 sec      Radiology (XR, CT, MR, U/S, TTE/MATTHEW):    CT Brain Stroke Protocol (03.03.21 @ 14:17)     FINDINGS:    Mild prominence of the sulci and ventricles are consistent with age-appropriate volume loss.  There are hemispheric white matter areas of low attenuation which are nonspecific but likely related to sequelae of microvascular disease.  There is no intraparenchymal hematoma, mass effect or midline shift.  No abnormal extra-axial fluid collections are present.  The basal cisterns are patent.    The calvarium is intact.  Bilateral cataract surgery.  Visualized paranasal sinuses appear free of acute disease.  Mastoid air cells are clear.    IMPRESSION:    Volume loss with microvascular disease, no acute hemorrhage or midline shift. If symptoms persist consider follow-up head CT or MR if no contraindications.      CT Angio Head w/ IV Cont (03.03.21 @ 15:13)     IMPRESSION:      HEAD CT:    HEAD CT: Mild volume loss, microvascular disease, no acute hemorrhage or midline shift.    If symptoms persist consider follow up head CT or MRI, MRA  if no contraindication.    CTA COW:  Patent intracranial circulation without flow limiting stenosis.    CTA NECK: Calcified plaque with suspected 50-69% stenosis in the left carotid bulb.

## 2021-03-03 NOTE — H&P ADULT - ASSESSMENT
NIGHT HOSPITALIST:   Presentation of patient with dizziness in the setting of labile BP control admitted as hypertensive urgency, upgraded to telemetry.   Patient with a complex medical history of PMR on Prednisone 3 mg daily, essential HTN, presumed GERD.  Seen by neurology in the ER as context of Code Stroke.  CTT head and neck nondiagnostic but with evidence of nonobstructive carotid disease.   Will obtain echo and carotid Duplex studies.  Will temporarily HOLD the Xanax (taken rarely but will HOLD due to BEERS risk).  Will also hold salt tablets with no evidence of hyponatremia, and patient already hypertensive.    Chest radiograph ordered (not done in the ER).      Would consider MRI brain with and without through internal auditory canal cuts, MRA head /neck

## 2021-03-03 NOTE — H&P ADULT - NSHPPHYSICALEXAM_GEN_ALL_CORE
Physical exam with an elderly, nontoxic F in no acute distress.    Afebrile.   HR  80  BP initially 208/83>> 140/58 without Rx.   100% on RA    HEENT< PERRL< EOMI  Neck supple  No thyromegaly  Chest clear  Cor s1 s2  Declined breast exam  Abdomen soft nontender, normal bowel sounds, no rebound  Skin dry  Ext no oedema.  Poor nail hygiene.   No ulcers.  Neurologic AxOx3.  speech fluent.  cognition intact.   UE/LE 5/5  No SI/HI.

## 2021-03-03 NOTE — CONSULT NOTE ADULT - ASSESSMENT
87 yo RH lady who presents to the facility on the afternoon of 3/3/21 regarding complaints of lightheadedness and difficulty ambulating that has persisted since 09:00 this morning. 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 and attends vestibular therapy, HTN, GERD. Patient reports she suffered a similar episode earlier in the week, BP noted to be elevated at that time by symptoms spontaneously resolved after about 30 minutes. Currently patient complaints of an "off feeling in her head" and lightheadedness which is exacerbated when standing up and produces difficultly ambulating secondary to symptoms. Patient currently denies speech/visual changes, n/v/d, abd pain, cp, sob, head trauma, headache, numbness/tingling. Code Stroke called on arrival, NIHSS of 0, BP of 210/78. CT Head noncontrast: Mild volume loss, microvascular disease, no acute hemorrhage or midline shift. CTA Head  Patent intracranial circulation without flow limiting stenosis. CTA NECK: Calcified plaque with suspected 50-69% stenosis in the left carotid bulb. Patient physical exam is nonfocal however patient demonstrates a significantly ataxic gait when asked to ambulate, this is without notable truncal ataxia and appears to be exacerbated by position changes. Patient may be suffering from hypertensive urgency given elevated BP and nonlocalizing findings. Given unilateral distribution and lack of left hemispheric findings it would appear that current symptomatology is unrelated to evident carotid stenosis despite positional component. Patient should be monitored for further observation and potential investigations in the form of an MRI to ascertain the absence of a neurovascular event related to imaged carotid stenosis although at present this does not appear to be the case. Hisotry        Impression: Hypertensive Urgency, R/O symptomatic carotid stenosis        Recommendations:    Gradual lowering of BP avoiding precipitous drops and avoiding hypotension  Obtain MRI Brain w/ and w/o with thin cuts through Internal Auditory Canal  Obtain MRA Head w/o, Neck w/  C/w Aspirin 81mg qd po  Consider BL Carotid Dopplers  Vitals and Neurochecks q4h  Fall precautions  PT/OT   87 yo RH lady who presents to the facility on the afternoon of 3/3/21 regarding complaints of lightheadedness and difficulty ambulating that has persisted since 09:00 this morning. 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 and attends vestibular therapy, HTN, GERD. Patient reports she suffered a similar episode earlier in the week, BP noted to be elevated at that time by symptoms spontaneously resolved after about 30 minutes. Currently patient complaints of an "off feeling in her head" and lightheadedness which is exacerbated when standing up and produces difficultly ambulating secondary to symptoms. Patient currently denies speech/visual changes, n/v/d, abd pain, cp, sob, head trauma, headache, numbness/tingling. Code Stroke called on arrival, NIHSS of 0, BP of 210/78. CT Head noncontrast: Mild volume loss, microvascular disease, no acute hemorrhage or midline shift. CTA Head  Patent intracranial circulation without flow limiting stenosis. CTA NECK: Calcified plaque with suspected 50-69% stenosis in the left carotid bulb. Patient physical exam is nonfocal however patient demonstrates a significantly ataxic gait when asked to ambulate, this is without notable truncal ataxia and appears to be exacerbated by position changes. Patient may be suffering from hypertensive urgency given elevated BP and nonlocalizing findings. Given unilateral distribution and lack of left hemispheric findings it would appear that current symptomatology is unrelated to evident carotid stenosis despite positional component. Patient should be monitored for further observation and potential investigations in the form of an MRI to ascertain the absence of a neurovascular event related to imaged carotid stenosis although at present this does not appear to be the case. Hisotry        Impression: Hypertensive Urgency, R/O symptomatic carotid stenosis        Recommendations:    Gradual lowering of BP avoiding precipitous drops and avoiding hypotension  Obtain MRI Brain w/ and w/o with thin cuts through Internal Auditory Canal  Obtain MRA Head w/o, Neck w/  C/w Aspirin 81mg qd po  Consider BL Carotid Dopplers  Lipid Panel and HgbA1c  Vitals and Neurochecks q4h  Fall precautions  PT/OT   87 yo RH lady who presents to the facility on the afternoon of 3/3/21 regarding complaints of lightheadedness and difficulty ambulating that has persisted since 09:00 this morning. 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 and attends vestibular therapy, HTN, GERD. Patient reports she suffered a similar episode earlier in the week, BP noted to be elevated at that time by symptoms spontaneously resolved after about 30 minutes. Currently patient complaints of an "off feeling in her head" and lightheadedness which is exacerbated when standing up and produces difficultly ambulating secondary to symptoms. Patient currently denies speech/visual changes, n/v/d, abd pain, cp, sob, head trauma, headache, numbness/tingling. Code Stroke called on arrival, NIHSS of 0, BP of 210/78. CT Head noncontrast: Mild volume loss, microvascular disease, no acute hemorrhage or midline shift. CTA Head  Patent intracranial circulation without flow limiting stenosis. CTA NECK: Calcified plaque with suspected 50-69% stenosis in the left carotid bulb. Patient physical exam is nonfocal however patient demonstrates a significantly ataxic gait when asked to ambulate, this is without notable truncal ataxia and appears to be exacerbated by position changes. Patient may be suffering from hypertensive urgency given elevated BP and nonlocalizing findings. Given unilateral distribution and lack of left hemispheric findings it would appear that current symptomatology is unrelated to evident carotid stenosis despite positional component. Patient should be monitored for further observation and potential investigations in the form of an MRI to ascertain the absence of a neurovascular event related to imaged carotid stenosis although at present this does not appear to be the case. History of vestibular neuronitis may be contributory but presence of elevated BP leans against this at the moment. No TPA given due to patient presenting outside of the window. No MT performed due to NIHSS < 6 and no LVO.        Impression: Hypertensive Urgency, R/O symptomatic carotid stenosis        Recommendations:    Gradual lowering of BP avoiding precipitous drops and avoiding hypotension  Obtain MRI Brain w/ and w/o with thin cuts through Internal Auditory Canal  Obtain MRA Head w/o, Neck w/  C/w Aspirin 81mg qd po  Consider BL Carotid Dopplers  Lipid Panel and HgbA1c  Vitals and Neurochecks q4h  Fall precautions  PT/OT   89 yo RH lady who presents to the facility on the afternoon of 3/3/21 regarding complaints of lightheadedness and difficulty ambulating that has persisted since 09:00 this morning. 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 and attends vestibular therapy, HTN, GERD. Patient reports she suffered a similar episode earlier in the week, BP noted to be elevated at that time by symptoms spontaneously resolved after about 30 minutes. Currently patient complaints of an "off feeling in her head" and lightheadedness which is exacerbated when standing up and produces difficultly ambulating secondary to symptoms. Patient currently denies speech/visual changes, n/v/d, abd pain, cp, sob, head trauma, headache, numbness/tingling. Code Stroke called on arrival, NIHSS of 0, BP of 210/78. CT Head noncontrast: Mild volume loss, microvascular disease, no acute hemorrhage or midline shift. CTA Head  Patent intracranial circulation without flow limiting stenosis. CTA NECK: Calcified plaque with suspected 50-69% stenosis in the left carotid bulb. Patient physical exam is nonfocal however patient demonstrates a significantly ataxic gait when asked to ambulate, this is without notable truncal ataxia and appears to be exacerbated by position changes. Patient may be suffering from hypertensive urgency given elevated BP and nonlocalizing findings. Given unilateral distribution and lack of left hemispheric findings it would appear that current symptomatology is unrelated to evident carotid stenosis despite positional component. Patient should be monitored for further observation and potential investigations in the form of an MRI to ascertain the absence of a neurovascular event related to imaged carotid stenosis although at present this does not appear to be the case. History of vestibular neuronitis may be contributory but presence of elevated BP leans against this at the moment. No TPA given due to patient presenting outside of the window. No MT performed due to NIHSS < 6 and no LVO.        Impression: Hypertensive Urgency, R/O symptomatic carotid stenosis        Recommendations:    Gradual lowering of BP avoiding precipitous drops and avoiding hypotension  Obtain MRI Brain w/ and w/o with thin cuts through Internal Auditory Canal  Obtain MRA Head w/o, Neck w/  C/w Aspirin 81mg qd po  Consider BL Carotid Dopplers  Lipid Panel and HgbA1c  Vitals and Neurochecks q4h  Orthostatic Vitals  Fall precautions  PT/OT

## 2021-03-03 NOTE — H&P ADULT - ATTENDING COMMENTS
NIGHT HOSPITALIST:  Patient/ son aware of course and agrees with plan/care as above.   Patient did not wish examiner to contact son at this time.  Emotional support provided to patient.  Care reviewed with covering Analy AGUILA for endorsement to Dr. Figueroa.    August Saha MD  [phone]

## 2021-03-03 NOTE — ED PROVIDER NOTE - OBJECTIVE STATEMENT
87 yo female PMhx HTN, GERD presents to the ED c/o feeling off-balance/unsteady gait that began at 0900 today. Pt had similar episode earlier in the week, BP noted to be elevated at that time by symptoms spontaneously resolved after about 30 minutes. Currently c/o feeling room spinning dizziness, difficultly ambulating secondary to sxs. Denies speech/visual changes, n/v/d, abd pain, cp, sob, head trauma, headache, numbness/tingling.

## 2021-03-03 NOTE — PROVIDER CONTACT NOTE (OTHER) - ACTION/TREATMENT ORDERED:
NP aware. EKG ordered. NP to bedside. Will continue to monitor. NP aware. EKG ordered. NP to bedside. Administer Toprolol xl 50mg early per NP. Will continue to monitor.

## 2021-03-03 NOTE — CHART NOTE - NSCHARTNOTEFT_GEN_A_CORE
Notified by RN that patient on Tele with  brief episodes of A-Tachy lasting around 3 minutes with -120. RN notes that arrythmia occurred while position changing.  Patient seen and evaluated at bedside, reports brief episode of palpitation, denies chest pain, SOB, dizziness, lightheadedness. Further states that she usually feels palpitations at home, especially when she does not take her Metoprolol. Notified by RN that patient on Tele with  brief episodes of A-Tachy lasting around 3 minutes with -120. RN noteed that arrythmia occurred while position changing.  Patient seen and evaluated at bedside, reports brief episode of palpitation, denies chest pain, SOB, dizziness, lightheadedness. Further states that she usually feels palpitations at home, especially when she does not take her Metoprolol.    Vital Signs Last 24 Hrs  T(C): 36.8 (03 Mar 2021 20:20), Max: 36.8 (03 Mar 2021 14:28)  T(F): 98.3 (03 Mar 2021 20:20), Max: 98.3 (03 Mar 2021 20:20)  HR: 73 (03 Mar 2021 20:20) (73 - 84)  BP: 128/60 (03 Mar 2021 20:20) (128/60 - 215/70)  BP(mean): --  RR: 18 (03 Mar 2021 20:20) (13 - 20)  SpO2: 95% (03 Mar 2021 20:20) (95% - 100%)    Arrythmia- Atrial Tachycardia  > STAT EKG- SR with 1st degree AVB with PVC's HR 75  > Will give home Notified by RN that patient on Tele with  brief episodes of A-Tachy lasting around 3 minutes with -120. RN noted that arrythmia occurred while position changing.  Patient seen and evaluated at bedside, reports brief episode of palpitation, denies chest pain, SOB, dizziness, lightheadedness.   Further states that she usually feels palpitations at home, especially when she does not take her Metoprolol.    Vital Signs Last 24 Hrs  T(C): 36.8 (03 Mar 2021 20:20), Max: 36.8 (03 Mar 2021 14:28)  T(F): 98.3 (03 Mar 2021 20:20), Max: 98.3 (03 Mar 2021 20:20)  HR: 73 (03 Mar 2021 20:20) (73 - 84)  BP: 128/60 (03 Mar 2021 20:20) (128/60 - 215/70)  BP(mean): --  RR: 18 (03 Mar 2021 20:20) (13 - 20)  SpO2: 95% (03 Mar 2021 20:20) (95% - 100%)    Arrythmia- Atrial Tachycardia  > STAT EKG- SR with 1st degree AVB with PVC's HR 75  > Will give home dose Metoprolol 50 mg now  > Continue Telemetry  > F/u with Primary team in [facility]  Department of Medicine  [phone]

## 2021-03-03 NOTE — H&P ADULT - NSCORESITESY/N_GEN_A_CORE_RD
No Patient resting comfortably, side rails up, call bell w/in reach, no further needs expressed at this time, aware of POC.

## 2021-03-03 NOTE — H&P ADULT - NSHPSOURCEINFOTX_GEN_ALL_CORE
Reviewed Medex with patient via patient recall.   [facility]  Reviewed Medex with patient via patient recall.   [facility]  contacted to confirm partial medication list.  Adult son aware of admission but patient did not wish examiner to contact family at this time. Reviewed Medex with patient via patient recall.   [place] Stop # [phone].  [facility]  contacted to confirm partial medication list.  Adult son aware of admission but patient did not wish examiner to contact family at this time.

## 2021-03-03 NOTE — H&P ADULT - NSHPSOCIALHISTORY_GEN_ALL_CORE
No tobacco or ethanol history.  Independent.  Lives alone.   Supportive adult son but patient did not wish examiner to contact family at this time.

## 2021-03-03 NOTE — H&P ADULT - NSHPLABSRESULTS_GEN_ALL_CORE
WBC 10.7    Hgb 13.2    Platelets of 264K.    INR 1.0    Normal differential.    sinus at 80 with 1AV.    CTT head with microvascular disease    CTT neck with calcified plaque LEFT carotid bulb 69%.    COVID-19 PCR>>pending. WBC 10.7    Hgb 13.2    Platelets of 264K.    INR 1.0    Normal differential.    sinus at 80 with 1AV.    CTT head with microvascular disease    CTT neck with calcified plaque LEFT carotid bulb 69%.    COVID-19 PCR>>pending.    Chest radiograph reviewed with no infiltrate or effusion. WBC 10.7    Hgb 13.2    Platelets of 264K.    INR 1.0    Normal differential.    sinus at 80 with 1AV.    CTT head with microvascular disease    CTT neck with calcified plaque LEFT carotid bulb 69%.    COVID-19 PCR>>negative.    Chest radiograph reviewed with no infiltrate or effusion.

## 2021-03-03 NOTE — ED ADULT NURSE NOTE - OBJECTIVE STATEMENT
87 y/o female coming in with c/o dizziness and off balance. A&Ox4. Ambulatory with a cane. PMH HTN, and vertigo. Patient reports monday she started to feel dizziness with changes in position that resolved on its own. Today around 0930 she began to feel dizzy and felt she had an unsteady gait, reports it "doesn't feel like vertigo." PERRL. Patient moving all extremities with equal strength and sensation b/l. Speech is clear. Abdomen is soft, non distended, non tender. Capillary refill less than 2 seconds. Patient able to ambulate from the wheelchair to the stretcher with assistance. Normal sinus on the monitor. EKG performed. Patient denies chest pain, SOB, fever, chills, n/v/d, urinary symptoms, abdominal pain. Safety measures maintained. Bed in the lowest position. Call bell within reach.  MD at the bedside. No acute distress noted or further complaints at this time. See yellow neuro sheet for neuro exams.

## 2021-03-04 ENCOUNTER — TRANSCRIPTION ENCOUNTER (OUTPATIENT)
Age: 86
End: 2021-03-04

## 2021-03-04 LAB
ANION GAP SERPL CALC-SCNC: 9 MMOL/L — SIGNIFICANT CHANGE UP (ref 5–17)
BASOPHILS # BLD AUTO: 0.02 K/UL — SIGNIFICANT CHANGE UP (ref 0–0.2)
BASOPHILS NFR BLD AUTO: 0.2 % — SIGNIFICANT CHANGE UP (ref 0–2)
BUN SERPL-MCNC: 12 MG/DL — SIGNIFICANT CHANGE UP (ref 7–23)
CALCIUM SERPL-MCNC: 9.5 MG/DL — SIGNIFICANT CHANGE UP (ref 8.4–10.5)
CHLORIDE SERPL-SCNC: 97 MMOL/L — SIGNIFICANT CHANGE UP (ref 96–108)
CO2 SERPL-SCNC: 30 MMOL/L — SIGNIFICANT CHANGE UP (ref 22–31)
CREAT SERPL-MCNC: 0.77 MG/DL — SIGNIFICANT CHANGE UP (ref 0.5–1.3)
EOSINOPHIL # BLD AUTO: 0.11 K/UL — SIGNIFICANT CHANGE UP (ref 0–0.5)
EOSINOPHIL NFR BLD AUTO: 1.4 % — SIGNIFICANT CHANGE UP (ref 0–6)
GLUCOSE SERPL-MCNC: 112 MG/DL — HIGH (ref 70–99)
HCT VFR BLD CALC: 37.1 % — SIGNIFICANT CHANGE UP (ref 34.5–45)
HGB BLD-MCNC: 11.8 G/DL — SIGNIFICANT CHANGE UP (ref 11.5–15.5)
IMM GRANULOCYTES NFR BLD AUTO: 0.2 % — SIGNIFICANT CHANGE UP (ref 0–1.5)
LYMPHOCYTES # BLD AUTO: 1.65 K/UL — SIGNIFICANT CHANGE UP (ref 1–3.3)
LYMPHOCYTES # BLD AUTO: 20.3 % — SIGNIFICANT CHANGE UP (ref 13–44)
MCHC RBC-ENTMCNC: 27.7 PG — SIGNIFICANT CHANGE UP (ref 27–34)
MCHC RBC-ENTMCNC: 31.8 GM/DL — LOW (ref 32–36)
MCV RBC AUTO: 87.1 FL — SIGNIFICANT CHANGE UP (ref 80–100)
MONOCYTES # BLD AUTO: 0.8 K/UL — SIGNIFICANT CHANGE UP (ref 0–0.9)
MONOCYTES NFR BLD AUTO: 9.8 % — SIGNIFICANT CHANGE UP (ref 2–14)
NEUTROPHILS # BLD AUTO: 5.53 K/UL — SIGNIFICANT CHANGE UP (ref 1.8–7.4)
NEUTROPHILS NFR BLD AUTO: 68.1 % — SIGNIFICANT CHANGE UP (ref 43–77)
NRBC # BLD: 0 /100 WBCS — SIGNIFICANT CHANGE UP (ref 0–0)
PLATELET # BLD AUTO: 250 K/UL — SIGNIFICANT CHANGE UP (ref 150–400)
POTASSIUM SERPL-MCNC: 3.5 MMOL/L — SIGNIFICANT CHANGE UP (ref 3.5–5.3)
POTASSIUM SERPL-SCNC: 3.5 MMOL/L — SIGNIFICANT CHANGE UP (ref 3.5–5.3)
RBC # BLD: 4.26 M/UL — SIGNIFICANT CHANGE UP (ref 3.8–5.2)
RBC # FLD: 13.3 % — SIGNIFICANT CHANGE UP (ref 10.3–14.5)
SARS-COV-2 IGG SERPL QL IA: NEGATIVE — SIGNIFICANT CHANGE UP
SARS-COV-2 IGM SERPL IA-ACNC: 0.08 INDEX — SIGNIFICANT CHANGE UP
SODIUM SERPL-SCNC: 136 MMOL/L — SIGNIFICANT CHANGE UP (ref 135–145)
WBC # BLD: 8.13 K/UL — SIGNIFICANT CHANGE UP (ref 3.8–10.5)
WBC # FLD AUTO: 8.13 K/UL — SIGNIFICANT CHANGE UP (ref 3.8–10.5)

## 2021-03-04 PROCEDURE — 93306 TTE W/DOPPLER COMPLETE: CPT | Mod: 26

## 2021-03-04 PROCEDURE — 93880 EXTRACRANIAL BILAT STUDY: CPT | Mod: 26

## 2021-03-04 PROCEDURE — 93010 ELECTROCARDIOGRAM REPORT: CPT

## 2021-03-04 RX ORDER — PANTOPRAZOLE SODIUM 20 MG/1
40 TABLET, DELAYED RELEASE ORAL
Refills: 0 | Status: DISCONTINUED | OUTPATIENT
Start: 2021-03-04 | End: 2021-03-05

## 2021-03-04 RX ORDER — ATORVASTATIN CALCIUM 80 MG/1
40 TABLET, FILM COATED ORAL AT BEDTIME
Refills: 0 | Status: DISCONTINUED | OUTPATIENT
Start: 2021-03-04 | End: 2021-03-05

## 2021-03-04 RX ADMIN — LOSARTAN POTASSIUM 50 MILLIGRAM(S): 100 TABLET, FILM COATED ORAL at 05:39

## 2021-03-04 RX ADMIN — ENOXAPARIN SODIUM 40 MILLIGRAM(S): 100 INJECTION SUBCUTANEOUS at 11:18

## 2021-03-04 RX ADMIN — Medication 81 MILLIGRAM(S): at 11:18

## 2021-03-04 RX ADMIN — Medication 1000 UNIT(S): at 11:18

## 2021-03-04 RX ADMIN — Medication 3 MILLIGRAM(S): at 05:38

## 2021-03-04 RX ADMIN — Medication 240 MILLIGRAM(S): at 05:37

## 2021-03-04 RX ADMIN — PANTOPRAZOLE SODIUM 40 MILLIGRAM(S): 20 TABLET, DELAYED RELEASE ORAL at 14:46

## 2021-03-04 NOTE — PHYSICAL THERAPY INITIAL EVALUATION ADULT - DISCHARGE DISPOSITION, PT EVAL
home w/ home PT Acute rehab. Pt prefers d/c home. if dc'd home will benefit from Home PT for strength/balance training, progression of gait , rolling walker for ambulation at all times (pt owns), HHA to assist pt as needed

## 2021-03-04 NOTE — PHYSICAL THERAPY INITIAL EVALUATION ADULT - PERTINENT HX OF CURRENT PROBLEM, REHAB EVAL
Pt is an 89 y/o F--patient with a history of PMR steroid dependent, essential HTN, presumed GERD, with patient self referring following symptoms of dizziness and unsteadiness from this past Monday and another episode this AM at 0900.  No LOC.  Apparent resolution of symptoms after 30 minutes.   Patient denies HA or focal weakness.  NO speech or visual changes.  Patient denies a PPM.

## 2021-03-04 NOTE — CONSULT NOTE ADULT - SUBJECTIVE AND OBJECTIVE BOX
Patient seen and evaluated @ bedside  Chief Complaint: Dizziness and imbalance    HPI:    Patient is an 87 yo female who yesterday morning developed sudden onset of lightheadedness and imbalance with difficulty ambulating that has persisted prompting ER visit.  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  She differentiates current symptom from more usual vestibulitis because of a new, different feeling in her head.  BP was in the 150s at onset yesterday AM but subsequently increased to 170s.  In ER more marked elevation.  Patient has history of HTN and often more severe elevations related to anxiety.   Patient denies speech/visual changes, n/v/d, abd pain, cp, sob, head trauma, headache, numbness/tingling. Code Stroke called on arrival.  BP wasf 210/78. CT Head noncontrast: Mild volume loss, microvascular disease, no acute hemorrhage or midline shift. CTA Head  Patent intracranial circulation without flow limiting stenosis. CTA NECK: Calcified plaque with suspected 50-69% stenosis in the left carotid bulb.       Patient reports she suffered a similar episode 2 days prior and spontaneously resolved after about 30 minutes.     Approximately 2-3 weeks ago patient had visual disturbance where all objects appeared blurry.  This occurred with each eye and was self limited.    In ER had some SVT    This AM patient feels improved, able to walk but not quite at baseline.    PMH:     Hypertension  Asthma since childhood  Gastroesophageal reflux disease, ? evidence of prior Parish's esophagus  Hyponatremia  Diverticulitis x 3  Temporal arteritis/ PMR now quiescent  SVT  Pre-diabetes  Pernicious anemia  BL carpal tunnel syndrome  Gallstones symptomatic on one occasion  Bilateral carotid stenosis - moderate right, mild left  Aortic stenosis, moderate  IgG kappa paraproteinemia  Spinal stenosis  Basal cell carcinoma eyelid, Squamous cell 2020  Mobitz I block when on higher doses of metoprolol and verapamil  History of cataract extraction, unspecified laterality    PSH:   T & A as child  History of cataract extraction, BL   L lower lip Mohs for basal cell 2013  Blepharoplasty OD    Medications:   aspirin enteric coated 81 milliGRAM(s) Oral daily  atorvastatin 40 milliGRAM(s) Oral at bedtime  cholecalciferol 1000 Unit(s) Oral daily  enoxaparin Injectable 40 milliGRAM(s) SubCutaneous daily  losartan 50 milliGRAM(s) Oral daily  metoprolol succinate ER 50 milliGRAM(s) Oral at bedtime  pantoprazole    Tablet 40 milliGRAM(s) Oral before breakfast  predniSONE   Tablet 3 milliGRAM(s) Oral daily  verapamil  milliGRAM(s) Oral daily    Allergies:  adhesives (Other)  latex (Rash)  penicillins (Hives)    FAMILY HISTORY:  Family history of acute myocardial infarction father age 56  HTN 2 sisters  1 sister CHF  1 sister PVD  1 sister bladder ca  2 brothers bladder ca  1 brother PVD      Social History:  Smoking: None    REVIEW OF SYSTEMS:  CONSTITUTIONAL: No fevers or chills  EYES/ENT: No current visual changes  RESPIRATORY: No cough, No shortness of breath  CARDIOVASCULAR: No chest pain or palpitations  NEUROLOGICAL: No numbness headache  All other review of systems is negative unless indicated above    Physical Exam:  T(C): 37 (21 @ 19:58), Max: 37 (21 @ 19:58)  HR: 67 (21 @ 19:58) (60 - 78)  BP: 148/74 (21 @ 19:58) (116/65 - 155/74)  RR: 18 (21 @ 19:58) (18 - 18)  SpO2: 98% (21 @ 19:58) (95% - 98%)  Wt(kg): --     @ 07:01  -   @ 21:41  --------------------------------------------------------  IN: 200 mL / OUT: 0 mL / NET: 200 mL      Daily     Daily Weight in k.9 (03 Mar 2021 23:11)    Appearance:  Normal, NAD  Eyes:  EOMI  HEENT:  NC/AT  Neck:  No JVD  Respiratory: Clear to auscultation bilaterally  Cardiovascular: Normal S1 and S2 with 1/6 systolic murmur base and LSB.  No rubs or gallops  Abdomen:   BS normal, Soft,  Non-tender without organomegaly or masses  Extremities: Without edema    Labs:                        11.8   8.13  )-----------( 250      ( 04 Mar 2021 07:01 )             37.1         136  |  97  |  12  ----------------------------<  112<H>  3.5   |  30  |  0.77    Ca    9.5      04 Mar 2021 07:00    TPro  7.2  /  Alb  4.1  /  TBili  0.2  /  DBili  x   /  AST  20  /  ALT  14  /  AlkPhos  59  03-03    PT/INR - ( 03 Mar 2021 14:07 )   PT: 12.0 sec;   INR: 1.00 ratio         PTT - ( 03 Mar 2021 14:07 )  PTT:30.2 sec

## 2021-03-04 NOTE — PHYSICAL THERAPY INITIAL EVALUATION ADULT - PLANNED THERAPY INTERVENTIONS, PT EVAL
Stair Negotiation Training: GOAL- Patient will be able to negotiate up & down 1 flight of stairs independently with single railing, step to gait pattern, in 1-2 weeks/balance training/bed mobility training/gait training/strengthening/transfer training

## 2021-03-04 NOTE — PHYSICAL THERAPY INITIAL EVALUATION ADULT - GAIT TRAINING, PT EVAL
GOAL: Patient will ambulate 200-300  feet independently with appropriate assistive device as needed, in 1-2 weeks.

## 2021-03-04 NOTE — PROVIDER CONTACT NOTE (OTHER) - SITUATION
Pt w/ atrial tachycardia / atrial flutter on tele 110-120's for 3 minutes
Pt was seen on tele to be in [place]

## 2021-03-04 NOTE — CONSULT NOTE ADULT - ASSESSMENT
Impression:  Probable vestibulitis given history and thus far no evidence of stroke on CT or flow limiting stenosis on CTA.                       Hypertension with  occasional marked elevations when anxious.  BP at onset of symptoms was not at a level to have caused the symptom.                           SVT with patient not electing ablation.  Adeuqate control with metoprolol and verapamil however does have occasional Wenckebach which was associated with symptomatic pauses when on higher does of                      beta blockers/verapamil.     Plan: MRI brain, IAC as per neuro.           Physical therapy.           Observe on tele.          IF MRI without evidence of acute process, D/C soon continues to be able to ambulate without much imbalance.

## 2021-03-04 NOTE — PROVIDER CONTACT NOTE (OTHER) - BACKGROUND
Pt admitted for Hypertensive Urgency, R/O symptomatic carotid stenosis, Near Syncope.
Pt admitted for Hypertensive Urgency, R/O symptomatic carotid stenosis, Near Syncope.

## 2021-03-04 NOTE — CONSULT NOTE ADULT - REASON FOR ADMISSION
Episode of subjective dizziness this past Monday and this AM.
Episode of subjective dizziness this past Monday and this AM.

## 2021-03-04 NOTE — PHYSICAL THERAPY INITIAL EVALUATION ADULT - IMPAIRMENTS CONTRIBUTING TO GAIT DEVIATIONS, PT EVAL
Pt unsteady without AD, reaching for walls/objects in hallway for balance, listing towards R at times. Pt ambulated an additional 40 ft x 2 with rolling walker and CG assist. Pt's balance/sukhwinder much improved with use of rolling walker/impaired balance/narrow base of support/impaired postural control/decreased strength

## 2021-03-04 NOTE — PROVIDER CONTACT NOTE (OTHER) - ASSESSMENT
Pt AO x 4, pt neurologically stable. Pt endorses feeling some difference in her heart rate. Pt denies chest pain or SOB. VSS. No s/s of distress.
Pt AO x 4, pt neurologically stable. Pt endorses feeling heart racing during event. Pt denies chest pain or SOB. VSS. No s/s of distress.

## 2021-03-04 NOTE — DISCHARGE NOTE NURSING/CASE MANAGEMENT/SOCIAL WORK - PATIENT PORTAL LINK FT
You can access the FollowMyHealth Patient Portal offered by [facility] by registering at the following website: [email or web address]. By joining [facility]’s FollowMyHealth portal, you will also be able to view your health information using other applications (apps) compatible with our system.

## 2021-03-04 NOTE — PHYSICAL THERAPY INITIAL EVALUATION ADULT - LIVES WITH, PROFILE
Pt lives alone in a private house. Pt reports 5 stairs to enter with binu railings, 1 FOS to bedroom/alone

## 2021-03-04 NOTE — PHYSICAL THERAPY INITIAL EVALUATION ADULT - PRECAUTIONS/LIMITATIONS, REHAB EVAL
BP noted to be elevated at that time by symptoms spontaneously resolved after about 30 minutes. Currently patient complaints of an "off feeling in her head" and lightheadedness which is exacerbated when standing up and produces difficultly ambulating secondary to symptoms. Patient currently denies speech/visual changes, n/v/d, abd pain, cp, sob, head trauma, headache, numbness/tingling. Code Stroke called on arrival, NIHSS of 0, BP of 210/78. CT Head noncontrast: Mild volume loss, microvascular disease, no acute hemorrhage or midline shift. CTA Head  Patent intracranial circulation without flow limiting stenosis. CTA NECK: Calcified plaque with suspected 50-69% stenosis in the left carotid bulb. Patient physical exam is nonfocal however patient demonstrates a significantly ataxic gait when asked to ambulate, this is without notable truncal ataxia and appears to be exacerbated by position changes. Patient may be suffering from hypertensive urgency given elevated BP and nonlocalizing findings. Given unilateral distribution and lack of left hemispheric findings it would appear that current symptomatology is unrelated to evident carotid stenosis despite positional component. Patient should be monitored for further observation and potential investigations in the form of an MRI to ascertain the absence of a neurovascular event related to imaged carotid stenosis although at present this does not appear to be the case. History of vestibular neuronitis may be contributory but presence of elevated BP leans against this at the moment. No TPA given due to patient presenting outside of the window. No MT performed due to NIHSS < 6 and no LVO. Patient reports she suffered a similar episode earlier in the week BP noted to be elevated at that time by symptoms spontaneously resolved after about 30 minutes. Currently patient complaints of an "off feeling in her head" and lightheadedness which is exacerbated when standing up and produces difficultly ambulating secondary to symptoms. Patient currently denies speech/visual changes, n/v/d, abd pain, cp, sob, head trauma, headache, numbness/tingling. Code Stroke called on arrival, NIHSS of 0, BP of 210/78. CT Head noncontrast: Mild volume loss, microvascular disease, no acute hemorrhage or midline shift. CTA Head  Patent intracranial circulation without flow limiting stenosis. CTA NECK: Calcified plaque with suspected 50-69% stenosis in the left carotid bulb. Patient physical exam is nonfocal however patient demonstrates a significantly ataxic gait when asked to ambulate, this is without notable truncal ataxia and appears to be exacerbated by position changes. Patient may be suffering from hypertensive urgency given elevated BP and nonlocalizing findings. Given unilateral distribution and lack of left hemispheric findings it would appear that current symptomatology is unrelated to evident carotid stenosis despite positional component. Patient should be monitored for further observation and potential investigations in the form of an MRI to ascertain the absence of a neurovascular event related to imaged carotid stenosis although at present this does not appear to be the case. History of vestibular neuronitis may be contributory but presence of elevated BP leans against this at the moment. No TPA given due to patient presenting outside of the window. No MT performed due to NIHSS < 6 and no LVO. Patient reports she suffered a similar episode earlier in the week BP noted to be elevated at that time by symptoms spontaneously resolved after about 30 minutes. Currently patient complaints of an "off feeling in her head" and lightheadedness which is exacerbated when standing up and produces difficultly ambulating secondary to symptoms. Patient currently denies speech/visual changes, n/v/d, abd pain, cp, sob, head trauma, headache, numbness/tingling. Code Stroke called on arrival, NIHSS of 0, BP of 210/78. CT Head noncontrast: Mild volume loss, microvascular disease, no acute hemorrhage or midline shift. CTA Head  Patent intracranial circulation without flow limiting stenosis. CTA NECK: Calcified plaque with suspected 50-69% stenosis in the left carotid bulb. Patient physical exam is nonfocal however patient demonstrates a significantly ataxic gait when asked to ambulate, this is without notable truncal ataxia and appears to be exacerbated by position changes. Patient may be suffering from hypertensive urgency given elevated BP and nonlocalizing findings. Given unilateral distribution and lack of left hemispheric findings it would appear that current symptomatology is unrelated to evident carotid stenosis despite positional component. Patient should be monitored for further observation and potential investigations in the form of an MRI to ascertain the absence of a neurovascular event related to imaged carotid stenosis although at present this does not appear to be the case. History of vestibular neuronitis may be contributory but presence of elevated BP leans against this at the moment. No TPA given due to patient presenting outside of the window. No MT performed due to NIHSS < 6 and no LVO./fall precautions

## 2021-03-04 NOTE — CHART NOTE - NSCHARTNOTEFT_GEN_A_CORE
GEORGE GUTIERREZ  [id number]      Notified by RN that pt has wenckeback on tele w/ HR 50s but can go down as 40s. Pt seen and evaluated at bedside. NAD. Pt denies CP, SOB, dizziness, nausea, vomiting, or pain in elsewhere. But stated she feels "her heartbeat is skipping sometimes."     Vital Signs Last 24 Hrs  T(C): 36.7 (21 @ 11:21), Max: 36.9 (21 @ 22:19)  T(F): 98.1 (21 @ 11:21), Max: 98.4 (21 @ 22:19)  HR: 60 (21 @ 11:21) (60 - 84)  BP: 144/78 (21 @ 11:21) (116/65 - 208/83)  RR: 18 (21 @ 11:21) (13 - 20)  SpO2: 97% (21 @ 11:21) (95% - 100%)    Daily Weight in k.9 (03 Mar 2021 23:11)  I&O's Summary    04 Mar 2021 07:01  -  04 Mar 2021 14:27  --------------------------------------------------------  IN: 200 mL / OUT: 0 mL / NET: 200 mL      CAPILLARY BLOOD GLUCOSE  89 (03 Mar 2021 16:05)                          11.8   8.13  )-----------( 250      ( 04 Mar 2021 07:01 )             37.1     04    136  |  97  |  12  ----------------------------<  112<H>  3.5   |  30  |  0.77    Ca    9.5      04 Mar 2021 07:00    TPro  7.2  /  Alb  4.1  /  TBili  0.2  /  DBili  x   /  AST  20  /  ALT  14  /  AlkPhos  59  03-03    PT/INR - ( 03 Mar 2021 14:07 )   PT: 12.0 sec;   INR: 1.00 ratio         PTT - ( 03 Mar 2021 14:07 )  PTT:30.2 sec    PHYSICAL EXAM:  GENERAL: NAD, well-developed  HEAD:  Atraumatic, Normocephalic  NECK: Supple, No JVD  CHEST/LUNG: Clear to auscultation bilaterally; No wheeze  HEART: Regular rate and rhythm; No murmurs, rubs, or gallops  NEUROLOGY: non-focal    Assessment/Plan:   89 yo RH lady who presents to the facility on the afternoon of 3/3/21 regarding complaints of lightheadedness and difficulty ambulating admitted w/ Hypertensive Urgency, R/O symptomatic carotid stenosis.  Now pt has wenckeback on tele and  EKG consistent w/ wenckeback. D/w Dr. Figueroa regarding this findings AV block and bradycardia and EKG texted and faxed to his office.  Dr. Figueroa stated pt had h/o Wenckeback and recommends to continue w/ current regimen since pt tends to develop SVT if pt not taking bb.     # Wenckeback w/ bradycardia   - Monitor on tele  - R2 pads at bedside given bradycardia to 40s  - C/w current regimen including toprol 50g QHS (but call Dr. Figueroa prior to give toprol if HR <50s)     Roxanne Grover PA-C  [id number] GEORGE GUTIERREZ  [id number]    Notified by RN that pt has wenckeback on tele w/ HR 50s but can go down as 40s. Pt seen and evaluated at bedside. NAD. Pt denies CP, SOB, dizziness, nausea, vomiting, or pain in elsewhere. But stated she feels "her heartbeat is skipping sometimes."     Vital Signs Last 24 Hrs  T(C): 36.7 (21 @ 11:21), Max: 36.9 (21 @ 22:19)  T(F): 98.1 (21 @ 11:21), Max: 98.4 (21 @ 22:19)  HR: 60 (21 @ 11:21) (60 - 84)  BP: 144/78 (21 @ 11:21) (116/65 - 208/83)  RR: 18 (21 @ 11:21) (13 - 20)  SpO2: 97% (21 @ 11:21) (95% - 100%)    Daily Weight in k.9 (03 Mar 2021 23:11)  I&O's Summary    04 Mar 2021 07:01  -  04 Mar 2021 14:27  --------------------------------------------------------  IN: 200 mL / OUT: 0 mL / NET: 200 mL      CAPILLARY BLOOD GLUCOSE  89 (03 Mar 2021 16:05)                          11.8   8.13  )-----------( 250      ( 04 Mar 2021 07:01 )             37.1     04    136  |  97  |  12  ----------------------------<  112<H>  3.5   |  30  |  0.77    Ca    9.5      04 Mar 2021 07:00    TPro  7.2  /  Alb  4.1  /  TBili  0.2  /  DBili  x   /  AST  20  /  ALT  14  /  AlkPhos  59  03-03    PT/INR - ( 03 Mar 2021 14:07 )   PT: 12.0 sec;   INR: 1.00 ratio         PTT - ( 03 Mar 2021 14:07 )  PTT:30.2 sec    PHYSICAL EXAM:  GENERAL: NAD, well-developed  HEAD:  Atraumatic, Normocephalic  NECK: Supple, No JVD  CHEST/LUNG: Clear to auscultation bilaterally; No wheeze  HEART: Regular rate and rhythm; No murmurs, rubs, or gallops  NEUROLOGY: non-focal    Assessment/Plan:   87 yo RH lady who presents to the facility on the afternoon of 3/3/21 regarding complaints of lightheadedness and difficulty ambulating admitted w/ Hypertensive Urgency, R/O symptomatic carotid stenosis.    Now pt has Wenckeback on tele and  EKG consistent w/ Wenckeback. D/w Dr. Figueroa regarding AV block and bradycardia, he stated pt had h/o Wenckeback and recommends to continue w/ current regimen since pt tends to develop SVT if pt not taking bb. EKG texted and faxed to his office.    # Wenckeback w/ bradycardia   - Monitor on tele  - R2 pads at bedside given bradycardia as low as 45, currently at 50s  - C/w current regimen including toprol 50mg QHS (but call Dr. Figueroa prior to give toprol if HR <50s)     Roxanne Grover PA-C  [id number]

## 2021-03-05 VITALS
TEMPERATURE: 98 F | HEART RATE: 50 BPM | OXYGEN SATURATION: 98 % | DIASTOLIC BLOOD PRESSURE: 66 MMHG | RESPIRATION RATE: 18 BRPM | SYSTOLIC BLOOD PRESSURE: 133 MMHG

## 2021-03-05 LAB
A1C WITH ESTIMATED AVERAGE GLUCOSE RESULT: 6 % — HIGH (ref 4–5.6)
ANION GAP SERPL CALC-SCNC: 11 MMOL/L — SIGNIFICANT CHANGE UP (ref 5–17)
BUN SERPL-MCNC: 12 MG/DL — SIGNIFICANT CHANGE UP (ref 7–23)
CALCIUM SERPL-MCNC: 9.5 MG/DL — SIGNIFICANT CHANGE UP (ref 8.4–10.5)
CHLORIDE SERPL-SCNC: 98 MMOL/L — SIGNIFICANT CHANGE UP (ref 96–108)
CHOLEST SERPL-MCNC: 198 MG/DL — SIGNIFICANT CHANGE UP
CO2 SERPL-SCNC: 26 MMOL/L — SIGNIFICANT CHANGE UP (ref 22–31)
CREAT SERPL-MCNC: 0.71 MG/DL — SIGNIFICANT CHANGE UP (ref 0.5–1.3)
ESTIMATED AVERAGE GLUCOSE: 126 MG/DL — HIGH (ref 68–114)
GLUCOSE SERPL-MCNC: 111 MG/DL — HIGH (ref 70–99)
HCT VFR BLD CALC: 36.4 % — SIGNIFICANT CHANGE UP (ref 34.5–45)
HDLC SERPL-MCNC: 65 MG/DL — SIGNIFICANT CHANGE UP
HGB BLD-MCNC: 11.7 G/DL — SIGNIFICANT CHANGE UP (ref 11.5–15.5)
LIPID PNL WITH DIRECT LDL SERPL: 108 MG/DL — HIGH
MAGNESIUM SERPL-MCNC: 2.1 MG/DL — SIGNIFICANT CHANGE UP (ref 1.6–2.6)
MCHC RBC-ENTMCNC: 28.1 PG — SIGNIFICANT CHANGE UP (ref 27–34)
MCHC RBC-ENTMCNC: 32.1 GM/DL — SIGNIFICANT CHANGE UP (ref 32–36)
MCV RBC AUTO: 87.3 FL — SIGNIFICANT CHANGE UP (ref 80–100)
NON HDL CHOLESTEROL: 133 MG/DL — HIGH
NRBC # BLD: 0 /100 WBCS — SIGNIFICANT CHANGE UP (ref 0–0)
PHOSPHATE SERPL-MCNC: 3.4 MG/DL — SIGNIFICANT CHANGE UP (ref 2.5–4.5)
PLATELET # BLD AUTO: 233 K/UL — SIGNIFICANT CHANGE UP (ref 150–400)
POTASSIUM SERPL-MCNC: 3.7 MMOL/L — SIGNIFICANT CHANGE UP (ref 3.5–5.3)
POTASSIUM SERPL-SCNC: 3.7 MMOL/L — SIGNIFICANT CHANGE UP (ref 3.5–5.3)
RBC # BLD: 4.17 M/UL — SIGNIFICANT CHANGE UP (ref 3.8–5.2)
RBC # FLD: 13.3 % — SIGNIFICANT CHANGE UP (ref 10.3–14.5)
SODIUM SERPL-SCNC: 135 MMOL/L — SIGNIFICANT CHANGE UP (ref 135–145)
TRIGL SERPL-MCNC: 125 MG/DL — SIGNIFICANT CHANGE UP
WBC # BLD: 8 K/UL — SIGNIFICANT CHANGE UP (ref 3.8–10.5)
WBC # FLD AUTO: 8 K/UL — SIGNIFICANT CHANGE UP (ref 3.8–10.5)

## 2021-03-05 PROCEDURE — 70551 MRI BRAIN STEM W/O DYE: CPT | Mod: 26

## 2021-03-05 RX ORDER — CHOLECALCIFEROL (VITAMIN D3) 125 MCG
1000 CAPSULE ORAL
Qty: 0 | Refills: 0 | DISCHARGE
Start: 2021-03-05

## 2021-03-05 RX ADMIN — Medication 3 MILLIGRAM(S): at 06:25

## 2021-03-05 RX ADMIN — Medication 50 MILLIGRAM(S): at 00:30

## 2021-03-05 RX ADMIN — LOSARTAN POTASSIUM 50 MILLIGRAM(S): 100 TABLET, FILM COATED ORAL at 06:25

## 2021-03-05 RX ADMIN — PANTOPRAZOLE SODIUM 40 MILLIGRAM(S): 20 TABLET, DELAYED RELEASE ORAL at 06:25

## 2021-03-05 RX ADMIN — ENOXAPARIN SODIUM 40 MILLIGRAM(S): 100 INJECTION SUBCUTANEOUS at 11:07

## 2021-03-05 RX ADMIN — Medication 81 MILLIGRAM(S): at 11:07

## 2021-03-05 RX ADMIN — Medication 1000 UNIT(S): at 11:07

## 2021-03-05 RX ADMIN — Medication 240 MILLIGRAM(S): at 06:25

## 2021-03-05 NOTE — DISCHARGE NOTE PROVIDER - NSDCCPCAREPLAN_GEN_ALL_CORE_FT
PRINCIPAL DISCHARGE DIAGNOSIS  Diagnosis: Disequilibrium  Assessment and Plan of Treatment: Symptoms have resolved, patient states no recurrence of the severe episodes that occurred Monday and Wednesday.  Most likely exacerbation of know vestibular dysfunction.        SECONDARY DISCHARGE DIAGNOSES  Diagnosis: SVT (supraventricular tachycardia)  Assessment and Plan of Treatment: Continue taking Toprol XL and verapamil as prescribed.    Diagnosis: Hyperlipidemia  Assessment and Plan of Treatment: Low salt, low fat, low cholesterol, diabetic diet if appropriate  Exercise, increase your activity level.  Continue taking pravastatin as prescribed.     PRINCIPAL DISCHARGE DIAGNOSIS  Diagnosis: Disequilibrium  Assessment and Plan of Treatment: Symptoms have resolved, patient states no recurrence of the severe episodes that occurred Monday and Wednesday.  Most likely exacerbation of know vestibular dysfunction.  Orthostatics negative.      SECONDARY DISCHARGE DIAGNOSES  Diagnosis: Hypertensive urgency  Assessment and Plan of Treatment: Low salt diet.  Activity as tolerated.  Follow up with your medical doctor for routine blood pressure monitoring at your next visit.  Notify your doctor if you have any of the following symptoms:   Dizziness, Lightheadedness, Blurry vision, Headache, Chest pain, Shortness of breath.  Continue taking losartan as prescribed.    Diagnosis: SVT (supraventricular tachycardia)  Assessment and Plan of Treatment: Continue taking Toprol XL and verapamil as prescribed.    Diagnosis: Hyperlipidemia  Assessment and Plan of Treatment: Low salt, low fat, low cholesterol, diabetic diet if appropriate  Exercise, increase your activity level.  Continue taking pravastatin as prescribed.

## 2021-03-05 NOTE — PROGRESS NOTE ADULT - ASSESSMENT
87 yo RH F HTN, GERD p/w lightheaded sensation. CTH unremarkable, CTA H/N with L carotid bulb 50-69% stenosis, CD with R mod ICA stenosis, A1c 6%. doubt vascular event.   - ASA 81mg, lipitor 40mg  -  lipids  - MRI brain done awaiting read. no further inpatient neuro workup if negative   - PT/OT  - check orthostatics .   - d/c planning if MRI neg. outpt f/u   - check FS, glucose control <180  - GI/DVT ppx  - Counseling on diet, exercise, and medication adherence was done  - Counseling on smoking cessation and alcohol consumption offered when appropriate.  - Pain assessed and judicious use of narcotics when appropriate was discussed.    - Stroke education given when appropriate.  - Importance of fall prevention discussed.   - Differential diagnosis and plan of care discussed with patient and/or family and primary team  - Thank you for allowing me to participate in the care of this patient. Call with questions.   - needs outpt f/u  Zac Gentile MD  Vascular Neurology  Office: [phone]

## 2021-03-05 NOTE — DISCHARGE NOTE PROVIDER - HOSPITAL COURSE
87 yo RH F HTN, GERD p/w lightheaded sensation. CTH unremarkable, CTA H/N with L carotid bulb 50-69% stenosis, carotid dopplers with R mod ICA stenosis, MRI/MRA Head unremarkable, A1c 6%. Vascular etiology of symptoms highly unlikely as per vascular. Most likely exacerbation of know vestibular dysfunction, now at baseline.  PT recommending Acute rehab but pt refusing and wants home w/ home PT instead. Pt stable for discharge and to follow up with PCP/cardiologist Dr Figueroa upon discharge.

## 2021-03-05 NOTE — DISCHARGE NOTE PROVIDER - NSDCMRMEDTOKEN_GEN_ALL_CORE_FT
aspirin 81 mg oral delayed release tablet: 1 tab(s) orally once a day  cholecalciferol oral tablet: 1000 unit(s) orally once a day  losartan 50 mg oral tablet: 1 tab(s) orally once a day  metoprolol succinate 50 mg oral tablet, extended release: 1 tab(s) orally once a day  pravastatin 20 mg oral tablet: 1 tab(s) orally once a day  predniSONE 1 mg oral tablet: 3 tab(s) orally once a day  PriLOSEC 20 mg oral delayed release capsule: 1 cap(s) orally once a day  Refresh ophthalmic solution: 1 drop(s) to each affected eye , As Needed  verapamil 240 mg/24 hours oral capsule, extended release: 1 cap(s) orally once a day  Vitamin D3 1000 intl units oral tablet: 1 tab(s) orally once a day  Xanax:

## 2021-03-05 NOTE — PROGRESS NOTE ADULT - ASSESSMENT
Impression:  Most likely exacerbation of know vestibular dysfunction now at baseline.                       SVT and occasional Mobitz 1 block on current medical regiom SVT frequency is acceptable as patient will not go for ablation.                        Lip d abnormality and carotid atherosclerosis.  Patient takes Pravastatin 40 mg.      Plan:  Unless any MRI finding when resulted mandates  further hospitalization, D/C to home later today with home physical therapy.              Office f/u 1-2 weeks.

## 2021-03-05 NOTE — PROGRESS NOTE ADULT - SUBJECTIVE AND OBJECTIVE BOX
Neurology Progress Note    S: Patient seen and examined. No new events overnight. patient denied CP, SOB, HA or pain. feels herself     Medication:  aspirin enteric coated 81 milliGRAM(s) Oral daily  atorvastatin 40 milliGRAM(s) Oral at bedtime  cholecalciferol 1000 Unit(s) Oral daily  enoxaparin Injectable 40 milliGRAM(s) SubCutaneous daily  losartan 50 milliGRAM(s) Oral daily  metoprolol succinate ER 50 milliGRAM(s) Oral at bedtime  pantoprazole    Tablet 40 milliGRAM(s) Oral before breakfast  predniSONE   Tablet 3 milliGRAM(s) Oral daily  verapamil  milliGRAM(s) Oral daily      Vitals:  Vital Signs Last 24 Hrs  T(C): 36.8 (05 Mar 2021 09:27), Max: 37 (04 Mar 2021 19:58)  T(F): 98.3 (05 Mar 2021 09:27), Max: 98.6 (04 Mar 2021 19:58)  HR: 52 (05 Mar 2021 09:27) (45 - 86)  BP: 118/65 (05 Mar 2021 09:27) (108/62 - 156/78)  BP(mean): --  RR: 18 (05 Mar 2021 09:27) (18 - 18)  SpO2: 96% (05 Mar 2021 09:27) (96% - 98%)    General Exam:   General Appearance: Appropriately dressed and in no acute distress       Head: Normocephalic, atraumatic and no dysmorphic features  Ear, Nose, and Throat: Moist mucous membranes  CVS: S1S2+  Resp: No SOB, no wheeze or rhonchi  Abd: soft NTND  Extremities: No edema, no cyanosis  Skin: No bruises, no rashes     Neurological Exam:  Mental Status: Awake, alert and oriented x 3.  Able to follow simple and complex verbal commands. Able to name and repeat. fluent speech. No obvious aphasia or dysarthria noted.   Cranial Nerves: PERRL, EOMI, VFFC, sensation V1-V3 intact,  no obvious facial asymmetry , equal elevation of palate, scm/trap 5/5, tongue is midline on protrusion. no obvious papilledema on fundoscopic exam. Hearing is grossly intact.   Motor: Normal bulk, tone and strength throughout. Fine finger movements were intact and symmetric. no tremors or drift noted.    Sensation: Intact to light touch and pinprick throughout. no right/left confusion. no extinction to tactile on DSS. Romberg was negative.   Reflexes: 1+ throughout at biceps, brachioradialis, triceps, patellars and ankles bilaterally and equal. No clonus. R toe and L toe were both downgoing.  Coordination: No dysmetria on FNF or HKS  Gait:  no limitations in gait.     I personally reviewed the below data/images/labs:      CBC Full  -  ( 05 Mar 2021 05:53 )  WBC Count : 8.00 K/uL  RBC Count : 4.17 M/uL  Hemoglobin : 11.7 g/dL  Hematocrit : 36.4 %  Platelet Count - Automated : 233 K/uL  Mean Cell Volume : 87.3 fl  Mean Cell Hemoglobin : 28.1 pg  Mean Cell Hemoglobin Concentration : 32.1 gm/dL  Auto Neutrophil # : x  Auto Lymphocyte # : x  Auto Monocyte # : x  Auto Eosinophil # : x  Auto Basophil # : x  Auto Neutrophil % : x  Auto Lymphocyte % : x  Auto Monocyte % : x  Auto Eosinophil % : x  Auto Basophil % : x    03-05    135  |  98  |  12  ----------------------------<  111<H>  3.7   |  26  |  0.71    Ca    9.5      05 Mar 2021 05:53  Phos  3.4     03-05  Mg     2.1     03-05    TPro  7.2  /  Alb  4.1  /  TBili  0.2  /  DBili  x   /  AST  20  /  ALT  14  /  AlkPhos  59  03-03    LIVER FUNCTIONS - ( 03 Mar 2021 14:07 )  Alb: 4.1 g/dL / Pro: 7.2 g/dL / ALK PHOS: 59 U/L / ALT: 14 U/L / AST: 20 U/L / GGT: x           PT/INR - ( 03 Mar 2021 14:07 )   PT: 12.0 sec;   INR: 1.00 ratio         PTT - ( 03 Mar 2021 14:07 )  PTT:30.2 sec    CT Brain Stroke Protocol (03.03.21 @ 14:17)     FINDINGS:    Mild prominence of the sulci and ventricles are consistent with age-appropriate volume loss.  There are hemispheric white matter areas of low attenuation which are nonspecific but likely related to sequelae of microvascular disease.  There is no intraparenchymal hematoma, mass effect or midline shift.  No abnormal extra-axial fluid collections are present.  The basal cisterns are patent.    The calvarium is intact.  Bilateral cataract surgery.  Visualized paranasal sinuses appear free of acute disease.  Mastoid air cells are clear.    IMPRESSION:    Volume loss with microvascular disease, no acute hemorrhage or midline shift. If symptoms persist consider follow-up head CT or MR if no contraindications.      CT Angio Head w/ IV Cont (03.03.21 @ 15:13)     IMPRESSION:      HEAD CT:    HEAD CT: Mild volume loss, microvascular disease, no acute hemorrhage or midline shift.    If symptoms persist consider follow up head CT or MRI, MRA  if no contraindication.    CTA COW:  Patent intracranial circulation without flow limiting stenosis.    CTA NECK: Calcified plaque with suspected 50-69% stenosis in the left carotid bulb.        EXAM:  CAROTID DUPLEX BILATERAL                            PROCEDURE DATE:  03/04/2021            INTERPRETATION:  CLINICAL INFORMATION: Syncope and collapse. Hypertension.    COMPARISON: Brain CT angiography 3/3/2021. Report from CT angiography:    COMMON CAROTID ARTERIES:  RIGHT: Patent without flow limiting stenosis.  LEFT: Patent without flow limiting stenosis.    CAROTID BULBS:  RIGHT: Calcified plaque.  LEFT: Calcified plaque. 50-69% stenosis suspected.    INTERNAL CAROTID ARTERIES:  RIGHT: Less than 50% stenosis at the ICA origin by NASCET criteria.  LEFT: Patent no evidence for any hemodynamically significant stenosis at the ICA origin by NASCET criteria.    TECHNIQUE: Grayscale, color and spectral Doppler examination of both carotid arteries was performed.    FINDINGS: Intimal thickening is seen bilaterally.  Calcified plaque is seen at the right carotid bulb and proximal right internal and external carotid arteries.  Calcified plaque is seen at the mid left common carotid artery, left carotid bulb and proximal left external carotid artery.    Peak systolic velocities are as follows:    RIGHT:  PROX CCA = 75 cm/s  DIST CCA = 59 cm/s  PROX ICA = 144 cm/s  DIST ICA = 75 cm/s  ECA = 152 cm/s    LEFT:  PROX CCA = 67 cm/s  DIST CCA = 54 cm/s  PROX ICA = 70 cm/s  DIST ICA = 49 cm/s  ECA = 99 cm/s    Antegrade flow is noted within both vertebral arteries.    IMPRESSION: Intimal thickening and plaque bilaterally as described above.    50-69% stenosis at the proximal right internal carotid artery with associated calcified atherosclerotic plaque.  Mild flow-limiting stenosis involving the right external carotid artery.    No definite significant hemodynamic stenosis at the left carotid system.    Measurement of carotid stenosis is based on velocity parameters that correlate the residual internal carotid diameter with that of the more distal vessel in accordance with a method such as the [place] [employer] Symptomatic Carotid Endarterectomy Trial (NASCET).    KANA RAMIRZE MD; Attending Radiologist  This document has been electronically signed. Mar  4 2021 12:27PM

## 2021-03-05 NOTE — PROGRESS NOTE ADULT - SUBJECTIVE AND OBJECTIVE BOX
Patient states back to her recent baseline re vestibular symptoms with usual sensation in head and imbalance with walking.  No recurrence of the severe episodes that  occurred Monday and Wednesday.    Had some Mobitz 1 block yesterday with transient heart rate in high 40s.  None since on tele.    Had MRI head and IAC but did not stay to do neck.  Not yet resulted.    Mild systolic BP elevation.        REVIEW OF SYSTEMS: See HPI  CARDIOVASCULAR: No chest pain, dyspnea or palpitations  All other review of systems is negative unless indicated above    Medications:  aspirin enteric coated 81 milliGRAM(s) Oral daily  atorvastatin 40 milliGRAM(s) Oral at bedtime  cholecalciferol 1000 Unit(s) Oral daily  enoxaparin Injectable 40 milliGRAM(s) SubCutaneous daily  losartan 50 milliGRAM(s) Oral daily  metoprolol succinate ER 50 milliGRAM(s) Oral at bedtime  pantoprazole    Tablet 40 milliGRAM(s) Oral before breakfast  predniSONE   Tablet 3 milliGRAM(s) Oral daily  verapamil  milliGRAM(s) Oral daily      Physical Exam:  Vitals:  T(C): 36.4 (03-05-21 @ 04:00), Max: 37 (03-04-21 @ 19:58)  HR: 63 (03-05-21 @ 04:00) (60 - 86)  BP: 108/62 (03-05-21 @ 04:00) (108/62 - 156/78)  BP(mean): --  RR: 18 (03-05-21 @ 04:00) (18 - 18)  SpO2: 96% (03-05-21 @ 04:00) (95% - 98%)  Wt(kg): --  Daily     Daily   I&O's Summary    04 Mar 2021 07:01  -  05 Mar 2021 07:00  --------------------------------------------------------  IN: 700 mL / OUT: 0 mL / NET: 700 mL        Appearance:  Normal, NAD  Eyes:  PERRL, EOMI  HEENT: Normal oral muscosa, NC/AT  Neck:  No JVD or HJR  Respiratory: Clear to auscultation bilaterally  Cardiovascular: Normal S1 and S2 without murmurs, rubs or gallops  Abdomen:   BS normal, Soft,  Non-tender without organomegally or masses  Extremities: Without edema, pulses are full          03-05    135  |  98  |  12  ----------------------------<  111<H>  3.7   |  26  |  0.71    Ca    9.5      05 Mar 2021 05:53  Phos  3.4     03-05  Mg     2.1     03-05    TPro  7.2  /  Alb  4.1  /  TBili  0.2  /  DBili  x   /  AST  20  /  ALT  14  /  AlkPhos  59  03-03    PT/INR - ( 03 Mar 2021 14:07 )   PT: 12.0 sec;   INR: 1.00 ratio         PTT - ( 03 Mar 2021 14:07 )  PTT:30.2 sec         Patient states back to her recent baseline re vestibular symptoms with usual sensation in head and imbalance with walking.  No recurrence of the severe episodes that  occurred Monday and Wednesday.    Had some Mobitz 1 block yesterday with transient heart rate in high 40s.  None since on tele.    Had MRI head and IAC but did not stay to do neck.  Not yet resulted.    Mild systolic BP elevation.        REVIEW OF SYSTEMS: See HPI  CARDIOVASCULAR: No chest pain, dyspnea or palpitations  All other review of systems is negative unless indicated above    Medications:  aspirin enteric coated 81 milliGRAM(s) Oral daily  atorvastatin 40 milliGRAM(s) Oral at bedtime  cholecalciferol 1000 Unit(s) Oral daily  enoxaparin Injectable 40 milliGRAM(s) SubCutaneous daily  losartan 50 milliGRAM(s) Oral daily  metoprolol succinate ER 50 milliGRAM(s) Oral at bedtime  pantoprazole    Tablet 40 milliGRAM(s) Oral before breakfast  predniSONE   Tablet 3 milliGRAM(s) Oral daily  verapamil  milliGRAM(s) Oral daily      Physical Exam:  Vitals:  T(C): 36.4 (03-05-21 @ 04:00), Max: 37 (03-04-21 @ 19:58)  HR: 63 (03-05-21 @ 04:00) (60 - 86)  BP: 108/62 (03-05-21 @ 04:00) (108/62 - 156/78)  BP(mean): --  RR: 18 (03-05-21 @ 04:00) (18 - 18)  SpO2: 96% (03-05-21 @ 04:00) (95% - 98%)  Wt(kg): --  Daily     Daily   I&O's Summary    04 Mar 2021 07:01  -  05 Mar 2021 07:00  --------------------------------------------------------  IN: 700 mL / OUT: 0 mL / NET: 700 mL        Appearance:  Normal, NAD  Eyes:  EOMI  HEENT:  NC/AT  Neck:  No JVD  Respiratory: Clear to auscultation bilaterally  Cardiovascular: Normal S1 and S2 with 1/6 systolic murmur base and LSB.  No rubs or gallops  Abdomen:   BS normal, Soft,  Non-tender without organomegaly or masses  Extremities: Without edema        03-05    Complete Blood Count in AM (03.05.21 @ 05:53)   Nucleated RBC: 0 /100 WBCs   WBC Count: 8.00 K/uL   RBC Count: 4.17 M/uL   Hemoglobin: 11.7 g/dL   Hematocrit: 36.4 %   Mean Cell Volume: 87.3 fl   Mean Cell Hemoglobin: 28.1 pg   Mean Cell Hemoglobin Conc: 32.1 gm/dL   Red Cell Distrib Width: 13.3 %   Platelet Count - Automated: 233 K/uL     135  |  98  |  12  ----------------------------<  111<H>  3.7   |  26  |  0.71    Ca    9.5      05 Mar 2021 05:53  Phos  3.4     03-05  Mg     2.1     03-05    TPro  7.2  /  Alb  4.1  /  TBili  0.2  /  DBili  x   /  AST  20  /  ALT  14  /  AlkPhos  59  03-03    PT/INR - ( 03 Mar 2021 14:07 )   PT: 12.0 sec;   INR: 1.00 ratio         PTT - ( 03 Mar 2021 14:07 )  PTT:30.2 sec

## 2021-03-05 NOTE — DISCHARGE NOTE PROVIDER - CARE PROVIDER_API CALL
Isaac Figueroa  CARDIOVASCULAR DISEASE  [facility], [place], operated [facility], [place] [address]  Phone: [phone]  Fax: [phone]  Established Patient  Follow Up Time: Routine

## 2021-03-16 PROCEDURE — 70450 CT HEAD/BRAIN W/O DYE: CPT

## 2021-03-16 PROCEDURE — 82962 GLUCOSE BLOOD TEST: CPT

## 2021-03-16 PROCEDURE — U0005: CPT

## 2021-03-16 PROCEDURE — 80048 BASIC METABOLIC PNL TOTAL CA: CPT

## 2021-03-16 PROCEDURE — 99285 EMERGENCY DEPT VISIT HI MDM: CPT | Mod: 25

## 2021-03-16 PROCEDURE — 84100 ASSAY OF PHOSPHORUS: CPT

## 2021-03-16 PROCEDURE — 85025 COMPLETE CBC W/AUTO DIFF WBC: CPT

## 2021-03-16 PROCEDURE — 83735 ASSAY OF MAGNESIUM: CPT

## 2021-03-16 PROCEDURE — 82803 BLOOD GASES ANY COMBINATION: CPT

## 2021-03-16 PROCEDURE — 80053 COMPREHEN METABOLIC PANEL: CPT

## 2021-03-16 PROCEDURE — 93005 ELECTROCARDIOGRAM TRACING: CPT

## 2021-03-16 PROCEDURE — 80061 LIPID PANEL: CPT

## 2021-03-16 PROCEDURE — U0003: CPT

## 2021-03-16 PROCEDURE — 85730 THROMBOPLASTIN TIME PARTIAL: CPT

## 2021-03-16 PROCEDURE — 85027 COMPLETE CBC AUTOMATED: CPT

## 2021-03-16 PROCEDURE — 97162 PT EVAL MOD COMPLEX 30 MIN: CPT

## 2021-03-16 PROCEDURE — 93306 TTE W/DOPPLER COMPLETE: CPT

## 2021-03-16 PROCEDURE — 71045 X-RAY EXAM CHEST 1 VIEW: CPT

## 2021-03-16 PROCEDURE — 93880 EXTRACRANIAL BILAT STUDY: CPT

## 2021-03-16 PROCEDURE — 85610 PROTHROMBIN TIME: CPT

## 2021-03-16 PROCEDURE — 83036 HEMOGLOBIN GLYCOSYLATED A1C: CPT

## 2021-03-16 PROCEDURE — 84484 ASSAY OF TROPONIN QUANT: CPT

## 2021-03-16 PROCEDURE — 70496 CT ANGIOGRAPHY HEAD: CPT

## 2021-03-16 PROCEDURE — 70551 MRI BRAIN STEM W/O DYE: CPT

## 2021-03-16 PROCEDURE — 70498 CT ANGIOGRAPHY NECK: CPT

## 2021-03-16 PROCEDURE — 86769 SARS-COV-2 COVID-19 ANTIBODY: CPT

## 2021-04-07 ENCOUNTER — TRANSCRIPTION ENCOUNTER (OUTPATIENT)
Age: 86
End: 2021-04-07

## 2021-08-30 NOTE — PATIENT PROFILE ADULT - NSPROMUTINFOINDIVIDFT_GEN_A_NUR
Thank you cor choosing [facility] for your care.     Today we discussed your medical conditions, recent ED visit and concerns. I have reviewed your hospitalization and labs, which were largely within normal. There was low concern for you having a blood clot in your lungs. I recommend that you get the COVID vaccine as soon as you can.     In terms of your leg pain, this seems related to pain involving your sciatic nerve. Please continue to follow up with the podiatrist and bring the paperwork for the next visit.     I have placed an order for your screening mammogram which you are due for.     Thank you and take care.   F/u in 3 months.     Dr. Shafer    not at this time

## 2021-10-11 ENCOUNTER — APPOINTMENT (OUTPATIENT)
Dept: ORTHOPEDIC SURGERY | Facility: CLINIC | Age: 86
End: 2021-10-11
Payer: MEDICARE

## 2021-10-11 VITALS
BODY MASS INDEX: 29.06 KG/M2 | DIASTOLIC BLOOD PRESSURE: 71 MMHG | OXYGEN SATURATION: 98 % | HEIGHT: 60 IN | WEIGHT: 148 LBS | HEART RATE: 70 BPM | SYSTOLIC BLOOD PRESSURE: 176 MMHG

## 2021-10-11 DIAGNOSIS — M48.061 SPINAL STENOSIS, LUMBAR REGION WITHOUT NEUROGENIC CLAUDICATION: ICD-10-CM

## 2021-10-11 PROCEDURE — 72100 X-RAY EXAM L-S SPINE 2/3 VWS: CPT

## 2021-10-11 PROCEDURE — 99204 OFFICE O/P NEW MOD 45 MIN: CPT

## 2021-11-03 NOTE — DISCHARGE NOTE NURSING/CASE MANAGEMENT/SOCIAL WORK - NSDCVIVACCINE_GEN_ALL_CORE_FT
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 When her caregiver, Olga Araujo, and I stand her up and walk with her, she tends to lean back and pull backward, instead of standing straight and leaning slightly forward. In the past, when we have observed this posture, we have requested a physical therapist from [facility] to provide therapy and exercises that have relieved this condition. Given the increased risk of falling from her backward lean, a physical therapist should be helpful in avoiding falls. [facility] requires an order from the Alzheimers patients personal physician. I would be grateful if you provide such an order.   Shubham Lorenzo No Vaccines Administered.

## 2022-04-06 ENCOUNTER — APPOINTMENT (OUTPATIENT)
Dept: VASCULAR SURGERY | Facility: CLINIC | Age: 87
End: 2022-04-06
Payer: MEDICARE

## 2022-04-06 PROCEDURE — 93880 EXTRACRANIAL BILAT STUDY: CPT

## 2022-05-03 ENCOUNTER — NON-APPOINTMENT (OUTPATIENT)
Age: 87
End: 2022-05-03

## 2022-05-04 ENCOUNTER — TRANSCRIPTION ENCOUNTER (OUTPATIENT)
Age: 87
End: 2022-05-04

## 2022-05-05 ENCOUNTER — OUTPATIENT (OUTPATIENT)
Dept: OUTPATIENT SERVICES | Facility: HOSPITAL | Age: 87
LOS: 1 days | End: 2022-05-05

## 2022-05-05 ENCOUNTER — APPOINTMENT (OUTPATIENT)
Dept: DISASTER EMERGENCY | Facility: HOSPITAL | Age: 87
End: 2022-05-05

## 2022-05-05 VITALS
HEART RATE: 117 BPM | DIASTOLIC BLOOD PRESSURE: 99 MMHG | SYSTOLIC BLOOD PRESSURE: 165 MMHG | WEIGHT: 147.05 LBS | HEIGHT: 60 IN | RESPIRATION RATE: 18 BRPM | TEMPERATURE: 99 F | OXYGEN SATURATION: 98 %

## 2022-05-05 VITALS
RESPIRATION RATE: 18 BRPM | OXYGEN SATURATION: 96 % | SYSTOLIC BLOOD PRESSURE: 146 MMHG | DIASTOLIC BLOOD PRESSURE: 76 MMHG | HEART RATE: 78 BPM | TEMPERATURE: 98 F

## 2022-05-05 DIAGNOSIS — Z98.49 CATARACT EXTRACTION STATUS, UNSPECIFIED EYE: Chronic | ICD-10-CM

## 2022-05-05 DIAGNOSIS — U07.1 COVID-19: ICD-10-CM

## 2022-05-05 RX ORDER — ACETAMINOPHEN 500 MG
650 TABLET ORAL ONCE
Refills: 0 | Status: DISCONTINUED | OUTPATIENT
Start: 2022-05-05 | End: 2022-05-19

## 2022-05-05 RX ORDER — BEBTELOVIMAB 87.5 MG/ML
175 INJECTION, SOLUTION INTRAVENOUS ONCE
Refills: 0 | Status: COMPLETED | OUTPATIENT
Start: 2022-05-05 | End: 2022-05-05

## 2022-05-05 RX ADMIN — BEBTELOVIMAB 175 MILLIGRAM(S): 87.5 INJECTION, SOLUTION INTRAVENOUS at 09:57

## 2022-05-05 NOTE — CHART NOTE - NSCHARTNOTEFT_GEN_A_CORE
Patient noted with tachycardia to 118. Patient with known history of anxiety & upon arrival patient encouraged to relax with deep breathing. Patient's HR noted 83 after some time to relax and conversation with daughter. Patient cleared for infusion. Suspect tachycardia 2/2 severe anxiety for which she takes Xanax chronically, last dose this AM 5/5 prior to arrival. Patient denies lightheadedness, HA, visual changes, chest pain, palpitations, SOB, dyspnea.    Ricki Owens PA-C  Medicine ACP, pgr [phone]  Available on [employer] Teams

## 2022-08-10 ENCOUNTER — NON-APPOINTMENT (OUTPATIENT)
Age: 87
End: 2022-08-10

## 2022-09-13 ENCOUNTER — EMERGENCY (EMERGENCY)
Facility: HOSPITAL | Age: 87
LOS: 1 days | Discharge: HOME | End: 2022-09-13
Attending: STUDENT IN AN ORGANIZED HEALTH CARE EDUCATION/TRAINING PROGRAM
Payer: MEDICARE

## 2022-09-13 VITALS
RESPIRATION RATE: 18 BRPM | SYSTOLIC BLOOD PRESSURE: 174 MMHG | HEART RATE: 80 BPM | DIASTOLIC BLOOD PRESSURE: 70 MMHG | TEMPERATURE: 98 F | OXYGEN SATURATION: 98 %

## 2022-09-13 VITALS
SYSTOLIC BLOOD PRESSURE: 184 MMHG | WEIGHT: 149.91 LBS | OXYGEN SATURATION: 97 % | HEART RATE: 83 BPM | TEMPERATURE: 98 F | RESPIRATION RATE: 20 BRPM | DIASTOLIC BLOOD PRESSURE: 77 MMHG | HEIGHT: 60 IN

## 2022-09-13 DIAGNOSIS — Z98.49 CATARACT EXTRACTION STATUS, UNSPECIFIED EYE: Chronic | ICD-10-CM

## 2022-09-13 LAB
ALBUMIN SERPL ELPH-MCNC: 4 G/DL — SIGNIFICANT CHANGE UP (ref 3.3–5)
ALP SERPL-CCNC: 47 U/L — SIGNIFICANT CHANGE UP (ref 40–120)
ALT FLD-CCNC: 11 U/L — SIGNIFICANT CHANGE UP (ref 10–45)
ANION GAP SERPL CALC-SCNC: 9 MMOL/L — SIGNIFICANT CHANGE UP (ref 5–17)
APPEARANCE UR: CLEAR — SIGNIFICANT CHANGE UP
AST SERPL-CCNC: 17 U/L — SIGNIFICANT CHANGE UP (ref 10–40)
BACTERIA # UR AUTO: NEGATIVE — SIGNIFICANT CHANGE UP
BASOPHILS # BLD AUTO: 0.01 K/UL — SIGNIFICANT CHANGE UP (ref 0–0.2)
BASOPHILS NFR BLD AUTO: 0.1 % — SIGNIFICANT CHANGE UP (ref 0–2)
BILIRUB SERPL-MCNC: 0.6 MG/DL — SIGNIFICANT CHANGE UP (ref 0.2–1.2)
BILIRUB UR-MCNC: NEGATIVE — SIGNIFICANT CHANGE UP
BUN SERPL-MCNC: 7 MG/DL — SIGNIFICANT CHANGE UP (ref 7–23)
CALCIUM SERPL-MCNC: 9.4 MG/DL — SIGNIFICANT CHANGE UP (ref 8.4–10.5)
CHLORIDE SERPL-SCNC: 94 MMOL/L — LOW (ref 96–108)
CO2 SERPL-SCNC: 29 MMOL/L — SIGNIFICANT CHANGE UP (ref 22–31)
COLOR SPEC: COLORLESS — SIGNIFICANT CHANGE UP
CREAT SERPL-MCNC: 0.65 MG/DL — SIGNIFICANT CHANGE UP (ref 0.5–1.3)
DIFF PNL FLD: NEGATIVE — SIGNIFICANT CHANGE UP
EGFR: 84 ML/MIN/1.73M2 — SIGNIFICANT CHANGE UP
EOSINOPHIL # BLD AUTO: 0.02 K/UL — SIGNIFICANT CHANGE UP (ref 0–0.5)
EOSINOPHIL NFR BLD AUTO: 0.2 % — SIGNIFICANT CHANGE UP (ref 0–6)
EPI CELLS # UR: 1 /HPF — SIGNIFICANT CHANGE UP
GLUCOSE SERPL-MCNC: 98 MG/DL — SIGNIFICANT CHANGE UP (ref 70–99)
GLUCOSE UR QL: NEGATIVE — SIGNIFICANT CHANGE UP
HCT VFR BLD CALC: 38.4 % — SIGNIFICANT CHANGE UP (ref 34.5–45)
HGB BLD-MCNC: 12.3 G/DL — SIGNIFICANT CHANGE UP (ref 11.5–15.5)
HYALINE CASTS # UR AUTO: 0 /LPF — SIGNIFICANT CHANGE UP (ref 0–2)
IMM GRANULOCYTES NFR BLD AUTO: 0.3 % — SIGNIFICANT CHANGE UP (ref 0–1.5)
KETONES UR-MCNC: SIGNIFICANT CHANGE UP
LEUKOCYTE ESTERASE UR-ACNC: ABNORMAL
LIDOCAIN IGE QN: 37 U/L — SIGNIFICANT CHANGE UP (ref 7–60)
LYMPHOCYTES # BLD AUTO: 0.81 K/UL — LOW (ref 1–3.3)
LYMPHOCYTES # BLD AUTO: 6.2 % — LOW (ref 13–44)
MCHC RBC-ENTMCNC: 27.6 PG — SIGNIFICANT CHANGE UP (ref 27–34)
MCHC RBC-ENTMCNC: 32 GM/DL — SIGNIFICANT CHANGE UP (ref 32–36)
MCV RBC AUTO: 86.3 FL — SIGNIFICANT CHANGE UP (ref 80–100)
MONOCYTES # BLD AUTO: 1.29 K/UL — HIGH (ref 0–0.9)
MONOCYTES NFR BLD AUTO: 9.9 % — SIGNIFICANT CHANGE UP (ref 2–14)
NEUTROPHILS # BLD AUTO: 10.89 K/UL — HIGH (ref 1.8–7.4)
NEUTROPHILS NFR BLD AUTO: 83.3 % — HIGH (ref 43–77)
NITRITE UR-MCNC: NEGATIVE — SIGNIFICANT CHANGE UP
NRBC # BLD: 0 /100 WBCS — SIGNIFICANT CHANGE UP (ref 0–0)
PH UR: 7 — SIGNIFICANT CHANGE UP (ref 5–8)
PLATELET # BLD AUTO: 267 K/UL — SIGNIFICANT CHANGE UP (ref 150–400)
POTASSIUM SERPL-MCNC: 4.4 MMOL/L — SIGNIFICANT CHANGE UP (ref 3.5–5.3)
POTASSIUM SERPL-SCNC: 4.4 MMOL/L — SIGNIFICANT CHANGE UP (ref 3.5–5.3)
PROT SERPL-MCNC: 6.8 G/DL — SIGNIFICANT CHANGE UP (ref 6–8.3)
PROT UR-MCNC: NEGATIVE — SIGNIFICANT CHANGE UP
RBC # BLD: 4.45 M/UL — SIGNIFICANT CHANGE UP (ref 3.8–5.2)
RBC # FLD: 13.3 % — SIGNIFICANT CHANGE UP (ref 10.3–14.5)
RBC CASTS # UR COMP ASSIST: 3 /HPF — SIGNIFICANT CHANGE UP (ref 0–4)
SARS-COV-2 RNA SPEC QL NAA+PROBE: SIGNIFICANT CHANGE UP
SODIUM SERPL-SCNC: 132 MMOL/L — LOW (ref 135–145)
SP GR SPEC: 1.03 — HIGH (ref 1.01–1.02)
UROBILINOGEN FLD QL: NEGATIVE — SIGNIFICANT CHANGE UP
WBC # BLD: 13.06 K/UL — HIGH (ref 3.8–10.5)
WBC # FLD AUTO: 13.06 K/UL — HIGH (ref 3.8–10.5)
WBC UR QL: 9 /HPF — HIGH (ref 0–5)

## 2022-09-13 PROCEDURE — 80053 COMPREHEN METABOLIC PANEL: CPT

## 2022-09-13 PROCEDURE — 99284 EMERGENCY DEPT VISIT MOD MDM: CPT | Mod: FS

## 2022-09-13 PROCEDURE — 99284 EMERGENCY DEPT VISIT MOD MDM: CPT | Mod: 25

## 2022-09-13 PROCEDURE — U0003: CPT

## 2022-09-13 PROCEDURE — 96374 THER/PROPH/DIAG INJ IV PUSH: CPT | Mod: XU

## 2022-09-13 PROCEDURE — 74177 CT ABD & PELVIS W/CONTRAST: CPT | Mod: 26,MA

## 2022-09-13 PROCEDURE — 83690 ASSAY OF LIPASE: CPT

## 2022-09-13 PROCEDURE — 81001 URINALYSIS AUTO W/SCOPE: CPT

## 2022-09-13 PROCEDURE — 74177 CT ABD & PELVIS W/CONTRAST: CPT | Mod: MA

## 2022-09-13 PROCEDURE — 85025 COMPLETE CBC W/AUTO DIFF WBC: CPT

## 2022-09-13 PROCEDURE — U0005: CPT

## 2022-09-13 RX ORDER — MOXIFLOXACIN HYDROCHLORIDE TABLETS, 400 MG 400 MG/1
1 TABLET, FILM COATED ORAL
Qty: 10 | Refills: 0
Start: 2022-09-13 | End: 2022-09-22

## 2022-09-13 RX ORDER — MOXIFLOXACIN HYDROCHLORIDE TABLETS, 400 MG 400 MG/1
1 TABLET, FILM COATED ORAL
Qty: 14 | Refills: 0
Start: 2022-09-13 | End: 2022-09-19

## 2022-09-13 RX ORDER — METRONIDAZOLE 500 MG
1 TABLET ORAL
Qty: 21 | Refills: 0
Start: 2022-09-13 | End: 2022-09-19

## 2022-09-13 RX ORDER — ACETAMINOPHEN 500 MG
1000 TABLET ORAL ONCE
Refills: 0 | Status: COMPLETED | OUTPATIENT
Start: 2022-09-13 | End: 2022-09-13

## 2022-09-13 RX ADMIN — Medication 400 MILLIGRAM(S): at 09:15

## 2022-09-13 NOTE — ED ADULT NURSE NOTE - NSIMPLEMENTINTERV_GEN_ALL_ED
Implemented All Fall with Harm Risk Interventions:  [place] to call system. Call bell, personal items and telephone within reach. Instruct patient to call for assistance. Room bathroom lighting operational. Non-slip footwear when patient is off stretcher. Physically safe environment: no spills, clutter or unnecessary equipment. Stretcher in lowest position, wheels locked, appropriate side rails in place. Provide visual cue, wrist band, yellow gown, etc. Monitor gait and stability. Monitor for mental status changes and reorient to person, place, and time. Review medications for side effects contributing to fall risk. Reinforce activity limits and safety measures with patient and family. Provide visual clues: red socks.

## 2022-09-13 NOTE — ED PROVIDER NOTE - NSFOLLOWUPINSTRUCTIONS_ED_ALL_ED_FT
138 You were seen and evaluated in the ED for acute diverticulitis.   Please make sure to follow up with your primary care doctor within 1-2 days. Bring a copy of all of your results with you to your follow up appointments.   Return to the ER as discussed if you develop any new or worsening symptoms.   You may take Tylenol 1000mg every 6 hours as needed for pain.     PLEASE TAKE YOUR ANTIBIOTICS AS PRESCRIBED, FINISH THE ENTIRE COURSE.  MAKE SURE TO ADEQUATELY HYDRATE.

## 2022-09-13 NOTE — ED PROVIDER NOTE - OBJECTIVE STATEMENT
89 yo F with a PMH of HTN, GERD, Polymyalgia rheumatica on prednisone 3mg daily, hyponatremia, recurrent diverticulitis followed by GI, Dr. James p/w acute onset of sharp LLQ pain x yesterday AM. Pt states pain has been constant and worsening since onset. Given her hx of recurrent episodes tried to manage at home by transitioning to liquids but pain persisted and worsened last night. Now c/o radiating pain across her abdomen. Associated dysuria today. Had chills last night, none this morning. BM normal for pt, does not go regularly at baseline. Denies nausea, vomiting, fever, abd distension, blood in stool, diarrhea, hematuria, weakness, headache. Last divertic flare was some time last year. States she is usually tx with cipro solely as she is allergic to PCN and experiences vomiting with Flagyl.

## 2022-09-13 NOTE — ED PROVIDER NOTE - PHYSICAL EXAMINATION
CONSTITUTIONAL: Well appearing and in no apparent distress.  ENT: Airway patent, moist mucous membranes.   EYES: Pupils equal, round and reactive to light. EOMI. Conjunctiva normal appearing.   CARDIAC: Normal rate, regular rhythm.  Heart sounds S1, S2.    RESPIRATORY: Breath sounds clear and equal bilaterally.   GASTROINTESTINAL: Abdomen soft, not distended. +LLQ TTP. No rebound or guarding. NO CVAT bilaterally.   MUSCULOSKELETAL: Spine appears normal.  NEUROLOGICAL: Alert and oriented x3, no focal deficits, no motor or sensory deficits. 5/5 muscle strength throughout.  SKIN: Skin normal color, warm, dry and intact.   PSYCHIATRIC: Normal mood and affect.

## 2022-09-13 NOTE — ED PROVIDER NOTE - NSICDXPASTMEDICALHX_GEN_ALL_CORE_FT
PAST MEDICAL HISTORY:  Gastroesophageal reflux disease, esophagitis presence not specified     History of polymyalgia rheumatica     Hypertension

## 2022-09-13 NOTE — ED PROVIDER NOTE - IV ALTEPLASE EXCL REL HIDDEN
Detail Level: Detailed
Quality 110: Preventive Care And Screening: Influenza Immunization: Influenza Immunization Administered during Influenza season
show

## 2022-09-13 NOTE — ED ADULT NURSE NOTE - SUICIDE SCREENING QUESTION 3
Pt c/o left sided flank pain x 24 hours. Denies dysuria, denies hematuria. Pt alert and oriented x 4. Skin pink, warm, dry. Respirations even and unlabored. No distress noted at this time.
No

## 2022-09-13 NOTE — ED PROVIDER NOTE - PATIENT PORTAL LINK FT
You can access the FollowMyHealth Patient Portal offered by [facility] by registering at the following website: [email or web address]. By joining [email or web address]’s FollowMyHealth portal, you will also be able to view your health information using other applications (apps) compatible with our system.

## 2022-09-13 NOTE — ED PROVIDER NOTE - ATTENDING APP SHARED VISIT CONTRIBUTION OF CARE
89 F w/ PMH of HTN, GERD, Polymyalgia rheumatica on prednisone 3mg daily follows w/ Dr. Dillon, hyponatremia, recurrent diverticulitis followed by GI, Dr. James p/w acute onset of sharp LLQ pain x yesterday AM. constant and worsening no pain meds today reports she switched diet to liquids, now pain radiating across abdomen, w/ mild dysuria, no fevers +chills. On exam, pt is awake and alert in no distress, has clear lungs soft abd w/ LLQ suprapubic tenderness w/ guarding, pt w/ no cva tenderness, no lower leg edema, pt w/ 2+ radial and DP pulse, Plan for labs imaging concern for diveritulitis, less likely immunocompromised, pt on chronic steroids --> immunocompromised, dispo pendint results, has PCN allergy. accompanied by son, lives alone, on verapamil, prilosec, metoprolol, torsemide twice a week, pred 3mg

## 2022-10-06 NOTE — CONSULT NOTE ADULT - CONSULT REQUESTED BY NAME
- Tests today: blood levels (see if anemia is better), kidneys, urine  Preparing for Your Surgery  Getting started  A nurse will call you to review your health history and instructions. They will give you an arrival time based on your scheduled surgery time. Please be ready to share:  Your doctor's clinic name and phone number  Your medical, surgical and anesthesia history  A list of allergies and sensitivities  A list of medicines, including herbal treatments and over-the-counter drugs  Whether the patient has a legal guardian (ask how to send us the papers in advance)  Please tell us if you're pregnant--or if there's any chance you might be pregnant. Some surgeries may injure a fetus (unborn baby), so they require a pregnancy test. Surgeries that are safe for a fetus don't always need a test, and you can choose whether to have one.   If you have a child who's having surgery, please ask for a copy of Preparing for Your Child's Surgery.    Preparing for surgery  Within 10 to 30 days of surgery: Have a pre-op exam (sometimes called an H&P, or History and Physical). This can be done at a clinic or pre-operative center.  If you're having a , you may not need this exam. Talk to your care team.  At your pre-op exam, talk to your care team about all medicines you take. If you need to stop any medicines before surgery, ask when to start taking them again.  We do this for your safety. Many medicines can make you bleed too much during surgery. Some change how well surgery (anesthesia) drugs work.  Call your insurance company to let them know you're having surgery. (If you don't have insurance, call [phone].)  Call your clinic if there's any change in your health. This includes signs of a cold or flu (sore throat, runny nose, cough, rash, fever). It also includes a scrape or scratch near the surgery site.  If you have questions on the day of surgery, call your hospital or surgery center.  COVID testing  You may 
Patient is being seen via telehealth and does not have the necessary tools to report vitals.     The vitals from the last in-person visit are:    Height __5'7_____     Weight __161 lb_____  Temp _______   BP __110/70_____   Pulse __68_____
need to be tested for COVID-19 before having surgery. If so, we will give you instructions (or click here).  Eating and drinking guidelines  For your safety: Unless your surgeon tells you otherwise, follow the guidelines below.  Eat and drink as usual until 8 hours before surgery. After that, no food or milk.  Drink clear liquids until 2 hours before surgery. These are liquids you can see through, like water, Gatorade and Propel Water. You may also have black coffee and tea (no cream or milk).  Nothing by mouth within 2 hours of surgery. This includes gum, candy and breath mints.  If you drink alcohol: Stop drinking it the night before surgery.  If your care team tells you to take medicine on the morning of surgery, it's okay to take it with a sip of water.  Preventing infection  Shower or bathe the night before and morning of your surgery. Follow the instructions your clinic gave you. (If no instructions, use regular soap.)  Don't shave or clip hair near your surgery site. We'll remove the hair if needed.  Don't smoke or vape the morning of surgery. You may chew nicotine gum up to 2 hours before surgery. A nicotine patch is okay.  Note: Some surgeries require you to completely quit smoking and nicotine. Check with your surgeon.  Your care team will make every effort to keep you safe from infection. We will:  Clean our hands often with soap and water (or an alcohol-based hand rub).  Clean the skin at your surgery site with a special soap that kills germs.  Give you a special gown to keep you warm. (Cold raises the risk of infection.)  Wear special hair covers, masks, gowns and gloves during surgery.  Give antibiotic medicine, if prescribed. Not all surgeries need antibiotics.  What to bring on the day of surgery  Photo ID and insurance card  Copy of your health care directive, if you have one  Glasses and hearing aides (bring cases)  You can't wear contacts during surgery  Inhaler and eye drops, if you use them 
(tell us about these when you arrive)  CPAP machine or breathing device, if you use them  A few personal items, if spending the night  If you have . . .  A pacemaker, ICD (cardiac defibrillator) or other implant: Bring the ID card.  An implanted stimulator: Bring the remote control.  A legal guardian: Bring a copy of the certified (court-stamped) guardianship papers.  Please remove any jewelry, including body piercings. Leave jewelry and other valuables at home.  If you're going home the day of surgery  You must have a responsible adult drive you home. They should stay with you overnight as well.  If you don't have someone to stay with you, and you aren't safe to go home alone, we may keep you overnight. Insurance often won't pay for this.  After surgery  If it's hard to control your pain or you need more pain medicine, please call your surgeon's office.  Questions?   If you have any questions for your care team, list them here: _________________________________________________________________________________________________________________________________________________________________________ ____________________________________ ____________________________________ ____________________________________  For informational purposes only. Not to replace the advice of your health care provider. Copyright   2003, 2019 [facility]. All rights reserved. Clinically reviewed by Lynda Julian MD. [employer] [id number] - REV 07/22.    How to Take Your Medication Before Surgery  - Take all of your medications before surgery except as noted below (Hold calcium and fish oil until day after surgery.)     
Office patient of mine
ED

## 2022-10-11 NOTE — ED ADULT NURSE NOTE - PHONE #
Mariposa Gates was seen today for   Chief Complaint   Patient presents with   • Hearing Loss   • Office Visit   . Patient was referred by PCP.    Patient reports : hearing loss bilaterally. Patient denies: otalgia, tinnitus , noise exposure  and dizziness .    Otoscopy :  Right ear:Clear canal and visible tympanic membrane  Left ear:Clear canal and visible tympanic membrane    Tympanometry revealed   Right ear: Type A (normal)  Left ear: Type A (normal)    Speech reception thresholds were in agreement with audiometric configuration bilaterally.   Speech Discrimination:   Right ear: 88  %  Left ear:   96  %    Audiogram revealed a mild to moderately-severe sensorineural hearing loss bilaterallySee scanned audiogram for more details. Audiometry was completed without incident.   Insert ear phones  were used for testing. Reliability was Good.    Patient was notified that they are a candidate for amplification bilaterally.  Patient has medicaid and will need medical clearance for amplification. Will review case with an otolaryngologist, and have patient return if needed. Schedule for annual audiogram.  Schedule HA (hearing aid) Consultation with the [employer].  Patient was given a copy of Their audiogram today.    Recommendations:     Schedule hearing aid consultation with [employer].  and Schedule hearing aid consultation with [employer] after obtaining medical clearance for amplification    Return in one year for audiologic evaluation or sooner if a change in hearing is noticed    Patient was given a copy of Their audiogram today.   [phone]

## 2022-11-10 NOTE — H&P ADULT - NEGATIVE GENERAL GENITOURINARY SYMPTOMS
Physician part of Pt Assist forms completed and mailed to pt   normal urinary frequency/no hematuria/no dysuria

## 2022-11-26 ENCOUNTER — EMERGENCY (EMERGENCY)
Facility: HOSPITAL | Age: 87
LOS: 1 days | Discharge: HOME | End: 2022-11-26
Attending: STUDENT IN AN ORGANIZED HEALTH CARE EDUCATION/TRAINING PROGRAM
Payer: MEDICARE

## 2022-11-26 VITALS
DIASTOLIC BLOOD PRESSURE: 86 MMHG | RESPIRATION RATE: 19 BRPM | OXYGEN SATURATION: 98 % | SYSTOLIC BLOOD PRESSURE: 143 MMHG | HEIGHT: 65 IN | WEIGHT: 154.98 LBS | TEMPERATURE: 97 F | HEART RATE: 110 BPM

## 2022-11-26 VITALS
OXYGEN SATURATION: 98 % | SYSTOLIC BLOOD PRESSURE: 166 MMHG | RESPIRATION RATE: 18 BRPM | DIASTOLIC BLOOD PRESSURE: 78 MMHG | HEART RATE: 71 BPM

## 2022-11-26 DIAGNOSIS — Z98.49 CATARACT EXTRACTION STATUS, UNSPECIFIED EYE: Chronic | ICD-10-CM

## 2022-11-26 PROBLEM — Z87.39 PERSONAL HISTORY OF OTHER DISEASES OF THE MUSCULOSKELETAL SYSTEM AND CONNECTIVE TISSUE: Chronic | Status: ACTIVE | Noted: 2022-09-13

## 2022-11-26 PROCEDURE — 70450 CT HEAD/BRAIN W/O DYE: CPT | Mod: 26,MA

## 2022-11-26 PROCEDURE — 99284 EMERGENCY DEPT VISIT MOD MDM: CPT | Mod: 25

## 2022-11-26 PROCEDURE — 70450 CT HEAD/BRAIN W/O DYE: CPT | Mod: MA

## 2022-11-26 PROCEDURE — 99284 EMERGENCY DEPT VISIT MOD MDM: CPT

## 2022-11-26 RX ORDER — ACETAMINOPHEN 500 MG
975 TABLET ORAL ONCE
Refills: 0 | Status: COMPLETED | OUTPATIENT
Start: 2022-11-26 | End: 2022-11-26

## 2022-11-26 RX ADMIN — Medication 975 MILLIGRAM(S): at 09:02

## 2022-11-26 RX ADMIN — Medication 975 MILLIGRAM(S): at 09:44

## 2022-11-26 NOTE — ED PROVIDER NOTE - CARE PROVIDER_API CALL
Isaac Figueroa  CARDIOVASCULAR DISEASE  [facility] [facility], [place] [address]  Phone: [phone]  Fax: [phone]  Follow Up Time:

## 2022-11-26 NOTE — ED PROVIDER NOTE - PHYSICAL EXAMINATION
GENERAL: elderly female, lying in bed, NAD. Hypertensive otherwise Vitals wnl  -A: intact  -B: b/l breath sounds, ventilating well  -C: 2+ peripheral distal pulses, equal, bilateral. No signs of active external hemorrhage  -D: GCS 15  -E: No obvious deformities    HEENT: NC/AT except for slight left parietal scalp bruise, no facial tenderness, no periorbital or periauricular bruising  NECK: Trachea midline  CV: RRR, no murmurs  PULM: CTA b/l, no wheezes, rales  GI: Soft, NTND, no abdominal wall bruising/hematomas  MSK: Nontender extremities. No midline spinal tenderness, step-offs, or deformities. No paraspinal tenderness  -Range of motion: Full range UE b/l (shoulder, elbow, wrist, fingers); LE b/l (hip, knee, ankle); nonrestricted flexion/extension/rotation of back  -Strength: 5/5 muscle strength UE/LE b/l   BACK: No midline or paraspinal tenderness, step-offs, deformities  NEURO: AAOx4 (to person, place, time, event), GCS 15, pupils 3mm PERRL, sensation grossly intact  SKIN: No cyanosis, lacerations  PSCYH: Normal mood and affect

## 2022-11-26 NOTE — ED PROVIDER NOTE - NSFOLLOWUPINSTRUCTIONS_ED_ALL_ED_FT
YOU WERE SEEN IN THE ED FOR: fall    (R)EST  (I)CE (20 mins on area(s) every 2-4 hours for the next 48 hours)    FOR PAIN/FEVER, YOU MAY TAKE TYLENOL (acetaminophen). FOLLOW THE INSTRUCTIONS ON THE LABEL/CONTAINER.    PLEASE FOLLOW UP WITH YOUR PRIVATE PHYSICIAN WITHIN THE NEXT 48 HOURS. BRING COPIES OF YOUR RESULTS.    RETURN TO THE EMERGENCY DEPARTMENT IF YOU EXPERIENCE ANY NEW/CONCERNING/WORSENING SYMPTOMS.

## 2022-11-26 NOTE — ED ADULT NURSE NOTE - NSIMPLEMENTINTERV_GEN_ALL_ED
Implemented All Fall with Harm Risk Interventions:  [place] to call system. Call bell, personal items and telephone within reach. Instruct patient to call for assistance. Room bathroom lighting operational. Non-slip footwear when patient is off stretcher. Physically safe environment: no spills, clutter or unnecessary equipment. Stretcher in lowest position, wheels locked, appropriate side rails in place. Provide visual cue, wrist band, yellow gown, etc. Monitor gait and stability. Monitor for mental status changes and reorient to person, place, and time. Review medications for side effects contributing to fall risk. Reinforce activity limits and safety measures with patient and family. Provide visual clues: red socks.

## 2022-11-26 NOTE — ED PROVIDER NOTE - CLINICAL SUMMARY MEDICAL DECISION MAKING FREE TEXT BOX
Attending (Madi Vega D.O.):  89F hx of HTN here s/p mechanical fall with left sided head strike w/o loc, not in thinners/ac. +mild left deltoid pain, since improved. No analgesia taken. ABCs intact, GCS 15. Primary and 2ndary survery w/o acute findings except for slight left parietal scalp bruise. No midline spinal tenderness or step offs. No current indication for advanced c-spine imaging. No other signs of intrathoracic/intrabadominal pathology. Eval for ICH given age. CTH, ice, analgesia.

## 2022-11-26 NOTE — ED ADULT NURSE NOTE - OBJECTIVE STATEMENT
88 y/o F presents to the ED with son c/o post fall. Pt states she had to go to the bathroom this morning and she lost her balance when getting to the bathroom and fell back into the tub. Pt got herself up and called her son to come bring her to the ED. She denies any recent falls in the past 6 months, denies blood thinners. Pt states after a virus she had in 2019 she has had left her with chronic spells of vertigo on and off which have caused her to fall down in the past. She denies LOC, dizziness, chest pain, SOB, NVD. Pt has a PMH of HTN, HLD. She lives at home and is ambulatory and does not use any assistive devices. Pt denies any weakness. all four extremities are strong and pulses are present. PEERL is normal.

## 2022-11-26 NOTE — ED PROVIDER NOTE - PROGRESS NOTE DETAILS
[place] PGY2: CTH no acute bleed, notable for soft tissue swelling. [place] PGY2: CTH no acute bleed, notable for soft tissue swelling. Patient feels well. Plan to DC

## 2022-11-26 NOTE — ED PROVIDER NOTE - OBJECTIVE STATEMENT
89F not on ac/anitplt presents to the ED s/p mechanical fall. Patient was getting up to get ready, rushing in the bathroom. Patient has a baseline problem with gait stability but does not use assistance to ambulate. She lost her balance, and fell forward, flipped into the tub, hit the left side of her head. Able to stand and move after. Having left scalp pain and left arm pain. Denies vision changes, chest pain, shortness of breath, nausea, vomiting.

## 2022-11-26 NOTE — ED PROVIDER NOTE - CARE PLAN
Principal Discharge DX:	Mild closed head injury   1 Principal Discharge DX:	Mild closed head injury  Assessment and plan of treatment:	CTH R/O Bleed. notable for soft tissue swelling. F/u with PCP.

## 2022-11-26 NOTE — ED PROVIDER NOTE - PATIENT PORTAL LINK FT
You can access the FollowMyHealth Patient Portal offered by [facility] by registering at the following website: [email or web address]. By joining "[employer]"’s FollowMyHealth portal, you will also be able to view your health information using other applications (apps) compatible with our system.

## 2023-01-23 NOTE — ED PROVIDER NOTE - NS ED NOTE AC HIGH RISK COUNTRIES
Patient picked up by EMS for transport to [facility]. Transferred from bed to standing position with walker, pivot to cart with assist of 2-3 individuals. Patient sent with personal belongings bags x 3 and 1 red reusable bag. Patient preferred to have his phone in his pocket and hat on. Glasses in case in red bag along with wallet. Writer called Maria Ines POLANCO at [facility] to provide updated ETA.    No

## 2023-02-28 NOTE — STROKE CODE NOTE - IV ALTEPASE ADMIN HIDDEN
----- Message from Fausto Ch MD sent at 2/26/2023  8:55 AM CST -----  Let patient know tests are normal LV function      
Letter sent  
show

## 2023-05-01 NOTE — DISCHARGE NOTE PROVIDER - NSDCQMAMI_CARD_ALL_CORE

## 2023-05-10 NOTE — ED ADULT NURSE NOTE - TEMPLATE LIST FOR HEAD TO TOE ASSESSMENT
Behavior modification discussed in detail in regards to dietary habits. Nutritional education occurred during visit. Continue following recommendations  per dietitian. Improvement in fitness/exercise discussed with patient and the need for this  with/without surgery. Cardiac Clearance needed prior to surgery prn  Psychology evaluation with Dr. Terry Hamilton completed and reviewed  EGD completed 4/22/23 with Dr. Julius Perez- will call for results. Colonoscopy completed and reviewed. No concerning findings. Gastric emptying study completed and reviewed/ no concerning findings. Gastric emptying study wnl  Encouraged to attend support groups. Has completed x 2. Seca scale completed and reviewed. Initial labs completed and reviewed  Cholesterol 235/ Triglycerides 312- PCP recommended diet high in Omega 3 fatty acids/ consider fish oil/RYR. Repeat Parathyroid hormone wnl (28)  Drug screen (+)amphetamines- on rx Adderall  Nicotine (-). Discussed risks of nicotine a/w bariatric surgery. Must be nicotine free at least 90 days prior to surgery. Avoid nicotine life long post-op. EKG to be completed asap ( has orders)  Denies current pregnancy. Advised not to get pregnant for 18 months post bariatric surgery as this can increase risk of malnourishment potentially leading to low birth weight or malformation. Patient agrees to avoid pregnancy for at least 18 months post-op. Discussed importance of appropriate contraception. ( Hysterectomy)  Will need to be off work for 3-4 weeks post-bariatric surgery. No lifting/pushing/pulling over 20# for 4 weeks post-op  No NSAIDS 10 days prior to bariatric surgery. Avoid NSAID use post-op  Discussed Lovenox post-op bariatric surgery  LOMN received. Will continue following with multi-disciplinary team in preparation for bariatric surgery with the expectation of lifelong follow-up post-operatively.    Mammogram/ US completed ( Fibroglandular tissue) will repeat mammogram in 6
Abdominal Pain, N/V/D

## 2023-05-22 NOTE — ED ADULT NURSE NOTE - OBJECTIVE STATEMENT
Detail Level: Generalized
Detail Level: Zone
Detail Level: Simple
Patient is an 89 year old female c/o abdominal pain.  PT has pmhx of HTN, GERD, diverticulitis.  PT has had abdominal pain that has worsened over night.  Pt states the pain is radiating across abdomen.  PT is alert and oriented x 3.  PT denies SOB or chest pain.  PT respirations even and unlabored.  PT skin warm dry and she has a small skin tear to left forearm. PT lined and medicated per dr's orders.

## 2023-06-26 NOTE — STROKE CODE NOTE - NIH STROKE SCALE: 1B. LOC QUESTIONS, QM
(0) Answers both questions correctly
< from: CT Head No Cont (06.26.23 @ 08:43) >    FINDINGS:  No acute hemorrhage, hydrocephalus, midline shift or   extra-axial collections are identified. Age-appropriate involutional and   moderate to severe microvascular ischemic changes are present. A    The orbits are not remarkable in appearance.    Tiny left maxillary sinus polyp versus retention cyst.    The tympanomastoid cavities are free of acute disease.    IMPRESSION:    No acute hemorrhage, mass effect or extra-axial collections. No change   compared with prior study dated 4/19/2022    < end of copied text >    < from: Xray Chest 1 View- PORTABLE-Urgent (06.26.23 @ 07:53) >    Heart appears enlarged but is exaggerated by AP film shallow inspiration.   No focal consolidation bilaterally. Small left pleural effusion/pleural   scarring at the costophrenic angle similar to prior.    IMPRESSION: No acute change. Small left pleural effusion versus pleural   scarring    < end of copied text >

## 2023-07-20 NOTE — PHYSICAL THERAPY INITIAL EVALUATION ADULT - PATIENT PROFILE REVIEW, REHAB EVAL
Hpi Title: Evaluation of Skin Lesions
Have Your Spot(S) Been Treated In The Past?: has not been treated
yes

## 2023-12-10 ENCOUNTER — NON-APPOINTMENT (OUTPATIENT)
Age: 88
End: 2023-12-10

## 2023-12-28 ENCOUNTER — NON-APPOINTMENT (OUTPATIENT)
Age: 88
End: 2023-12-28

## 2024-01-01 NOTE — DISCHARGE NOTE ADULT - PRINCIPAL DIAGNOSIS
[de-identified] : Hypertensive urgency

## 2024-04-14 ENCOUNTER — EMERGENCY (EMERGENCY)
Facility: HOSPITAL | Age: 89
LOS: 1 days | Discharge: HOME | End: 2024-04-14
Attending: EMERGENCY MEDICINE
Payer: MEDICARE

## 2024-04-14 VITALS
TEMPERATURE: 98 F | HEART RATE: 65 BPM | DIASTOLIC BLOOD PRESSURE: 71 MMHG | SYSTOLIC BLOOD PRESSURE: 169 MMHG | RESPIRATION RATE: 15 BRPM | OXYGEN SATURATION: 97 %

## 2024-04-14 VITALS
OXYGEN SATURATION: 97 % | WEIGHT: 130.07 LBS | RESPIRATION RATE: 18 BRPM | TEMPERATURE: 99 F | HEART RATE: 65 BPM | SYSTOLIC BLOOD PRESSURE: 194 MMHG | DIASTOLIC BLOOD PRESSURE: 70 MMHG | HEIGHT: 60 IN

## 2024-04-14 DIAGNOSIS — Z98.49 CATARACT EXTRACTION STATUS, UNSPECIFIED EYE: Chronic | ICD-10-CM

## 2024-04-14 LAB
ALBUMIN SERPL ELPH-MCNC: 3.7 G/DL — SIGNIFICANT CHANGE UP (ref 3.3–5)
ALP SERPL-CCNC: 48 U/L — SIGNIFICANT CHANGE UP (ref 40–120)
ALT FLD-CCNC: 11 U/L — SIGNIFICANT CHANGE UP (ref 10–45)
ANION GAP SERPL CALC-SCNC: 12 MMOL/L — SIGNIFICANT CHANGE UP (ref 5–17)
APPEARANCE UR: CLEAR — SIGNIFICANT CHANGE UP
APTT BLD: 27.7 SEC — SIGNIFICANT CHANGE UP (ref 24.5–35.6)
AST SERPL-CCNC: 15 U/L — SIGNIFICANT CHANGE UP (ref 10–40)
BACTERIA # UR AUTO: NEGATIVE /HPF — SIGNIFICANT CHANGE UP
BASE EXCESS BLDV CALC-SCNC: 2.8 MMOL/L — SIGNIFICANT CHANGE UP (ref -2–3)
BASOPHILS # BLD AUTO: 0.03 K/UL — SIGNIFICANT CHANGE UP (ref 0–0.2)
BASOPHILS NFR BLD AUTO: 0.3 % — SIGNIFICANT CHANGE UP (ref 0–2)
BILIRUB SERPL-MCNC: 0.2 MG/DL — SIGNIFICANT CHANGE UP (ref 0.2–1.2)
BILIRUB UR-MCNC: NEGATIVE — SIGNIFICANT CHANGE UP
BUN SERPL-MCNC: 6 MG/DL — LOW (ref 7–23)
CA-I SERPL-SCNC: 1.19 MMOL/L — SIGNIFICANT CHANGE UP (ref 1.15–1.33)
CALCIUM SERPL-MCNC: 8.3 MG/DL — LOW (ref 8.4–10.5)
CAST: 0 /LPF — SIGNIFICANT CHANGE UP (ref 0–4)
CHLORIDE BLDV-SCNC: 92 MMOL/L — LOW (ref 96–108)
CHLORIDE SERPL-SCNC: 94 MMOL/L — LOW (ref 96–108)
CO2 BLDV-SCNC: 31 MMOL/L — HIGH (ref 22–26)
CO2 SERPL-SCNC: 26 MMOL/L — SIGNIFICANT CHANGE UP (ref 22–31)
COLOR SPEC: YELLOW — SIGNIFICANT CHANGE UP
CREAT SERPL-MCNC: 0.56 MG/DL — SIGNIFICANT CHANGE UP (ref 0.5–1.3)
DIFF PNL FLD: ABNORMAL
EGFR: 86 ML/MIN/1.73M2 — SIGNIFICANT CHANGE UP
EOSINOPHIL # BLD AUTO: 0.12 K/UL — SIGNIFICANT CHANGE UP (ref 0–0.5)
EOSINOPHIL NFR BLD AUTO: 1.3 % — SIGNIFICANT CHANGE UP (ref 0–6)
FLUAV AG NPH QL: SIGNIFICANT CHANGE UP
FLUBV AG NPH QL: SIGNIFICANT CHANGE UP
GAS PNL BLDV: 128 MMOL/L — LOW (ref 136–145)
GAS PNL BLDV: SIGNIFICANT CHANGE UP
GAS PNL BLDV: SIGNIFICANT CHANGE UP
GLUCOSE BLDV-MCNC: 111 MG/DL — HIGH (ref 70–99)
GLUCOSE SERPL-MCNC: 115 MG/DL — HIGH (ref 70–99)
GLUCOSE UR QL: NEGATIVE MG/DL — SIGNIFICANT CHANGE UP
HCO3 BLDV-SCNC: 29 MMOL/L — SIGNIFICANT CHANGE UP (ref 22–29)
HCT VFR BLD CALC: 34.6 % — SIGNIFICANT CHANGE UP (ref 34.5–45)
HCT VFR BLDA CALC: 35 % — SIGNIFICANT CHANGE UP (ref 34.5–46.5)
HGB BLD CALC-MCNC: 11.6 G/DL — LOW (ref 11.7–16.1)
HGB BLD-MCNC: 11.4 G/DL — LOW (ref 11.5–15.5)
IMM GRANULOCYTES NFR BLD AUTO: 0.3 % — SIGNIFICANT CHANGE UP (ref 0–0.9)
INR BLD: 1.16 RATIO — SIGNIFICANT CHANGE UP (ref 0.85–1.18)
KETONES UR-MCNC: NEGATIVE MG/DL — SIGNIFICANT CHANGE UP
LACTATE BLDV-MCNC: 1.8 MMOL/L — SIGNIFICANT CHANGE UP (ref 0.5–2)
LEUKOCYTE ESTERASE UR-ACNC: ABNORMAL
LYMPHOCYTES # BLD AUTO: 1.54 K/UL — SIGNIFICANT CHANGE UP (ref 1–3.3)
LYMPHOCYTES # BLD AUTO: 16.4 % — SIGNIFICANT CHANGE UP (ref 13–44)
MCHC RBC-ENTMCNC: 27.8 PG — SIGNIFICANT CHANGE UP (ref 27–34)
MCHC RBC-ENTMCNC: 32.9 GM/DL — SIGNIFICANT CHANGE UP (ref 32–36)
MCV RBC AUTO: 84.4 FL — SIGNIFICANT CHANGE UP (ref 80–100)
MONOCYTES # BLD AUTO: 0.82 K/UL — SIGNIFICANT CHANGE UP (ref 0–0.9)
MONOCYTES NFR BLD AUTO: 8.8 % — SIGNIFICANT CHANGE UP (ref 2–14)
NEUTROPHILS # BLD AUTO: 6.83 K/UL — SIGNIFICANT CHANGE UP (ref 1.8–7.4)
NEUTROPHILS NFR BLD AUTO: 72.9 % — SIGNIFICANT CHANGE UP (ref 43–77)
NITRITE UR-MCNC: NEGATIVE — SIGNIFICANT CHANGE UP
NRBC # BLD: 0 /100 WBCS — SIGNIFICANT CHANGE UP (ref 0–0)
PCO2 BLDV: 53 MMHG — HIGH (ref 39–42)
PH BLDV: 7.35 — SIGNIFICANT CHANGE UP (ref 7.32–7.43)
PH UR: 7.5 — SIGNIFICANT CHANGE UP (ref 5–8)
PLATELET # BLD AUTO: 261 K/UL — SIGNIFICANT CHANGE UP (ref 150–400)
PO2 BLDV: 24 MMHG — LOW (ref 25–45)
POTASSIUM BLDV-SCNC: 3.6 MMOL/L — SIGNIFICANT CHANGE UP (ref 3.5–5.1)
POTASSIUM SERPL-MCNC: 3.6 MMOL/L — SIGNIFICANT CHANGE UP (ref 3.5–5.3)
POTASSIUM SERPL-SCNC: 3.6 MMOL/L — SIGNIFICANT CHANGE UP (ref 3.5–5.3)
PROT SERPL-MCNC: 6.2 G/DL — SIGNIFICANT CHANGE UP (ref 6–8.3)
PROT UR-MCNC: NEGATIVE MG/DL — SIGNIFICANT CHANGE UP
PROTHROM AB SERPL-ACNC: 12.1 SEC — SIGNIFICANT CHANGE UP (ref 9.5–13)
RBC # BLD: 4.1 M/UL — SIGNIFICANT CHANGE UP (ref 3.8–5.2)
RBC # FLD: 13.5 % — SIGNIFICANT CHANGE UP (ref 10.3–14.5)
RBC CASTS # UR COMP ASSIST: 1 /HPF — SIGNIFICANT CHANGE UP (ref 0–4)
RSV RNA NPH QL NAA+NON-PROBE: SIGNIFICANT CHANGE UP
SAO2 % BLDV: 39 % — LOW (ref 67–88)
SARS-COV-2 RNA SPEC QL NAA+PROBE: SIGNIFICANT CHANGE UP
SODIUM SERPL-SCNC: 132 MMOL/L — LOW (ref 135–145)
SP GR SPEC: 1.01 — SIGNIFICANT CHANGE UP (ref 1–1.03)
SQUAMOUS # UR AUTO: 0 /HPF — SIGNIFICANT CHANGE UP (ref 0–5)
TROPONIN T, HIGH SENSITIVITY RESULT: 22 NG/L — SIGNIFICANT CHANGE UP (ref 0–51)
UROBILINOGEN FLD QL: 0.2 MG/DL — SIGNIFICANT CHANGE UP (ref 0.2–1)
WBC # BLD: 9.37 K/UL — SIGNIFICANT CHANGE UP (ref 3.8–10.5)
WBC # FLD AUTO: 9.37 K/UL — SIGNIFICANT CHANGE UP (ref 3.8–10.5)
WBC UR QL: 6 /HPF — HIGH (ref 0–5)

## 2024-04-14 PROCEDURE — 87086 URINE CULTURE/COLONY COUNT: CPT

## 2024-04-14 PROCEDURE — 70450 CT HEAD/BRAIN W/O DYE: CPT | Mod: XU,MC

## 2024-04-14 PROCEDURE — 82803 BLOOD GASES ANY COMBINATION: CPT

## 2024-04-14 PROCEDURE — 99285 EMERGENCY DEPT VISIT HI MDM: CPT | Mod: GC

## 2024-04-14 PROCEDURE — 70496 CT ANGIOGRAPHY HEAD: CPT | Mod: 26,MC

## 2024-04-14 PROCEDURE — 93005 ELECTROCARDIOGRAM TRACING: CPT

## 2024-04-14 PROCEDURE — 81001 URINALYSIS AUTO W/SCOPE: CPT

## 2024-04-14 PROCEDURE — 84132 ASSAY OF SERUM POTASSIUM: CPT

## 2024-04-14 PROCEDURE — 71045 X-RAY EXAM CHEST 1 VIEW: CPT

## 2024-04-14 PROCEDURE — 70498 CT ANGIOGRAPHY NECK: CPT | Mod: 26,MC

## 2024-04-14 PROCEDURE — 85730 THROMBOPLASTIN TIME PARTIAL: CPT

## 2024-04-14 PROCEDURE — 83605 ASSAY OF LACTIC ACID: CPT

## 2024-04-14 PROCEDURE — 82330 ASSAY OF CALCIUM: CPT

## 2024-04-14 PROCEDURE — 71045 X-RAY EXAM CHEST 1 VIEW: CPT | Mod: 26

## 2024-04-14 PROCEDURE — 85018 HEMOGLOBIN: CPT

## 2024-04-14 PROCEDURE — 87637 SARSCOV2&INF A&B&RSV AMP PRB: CPT

## 2024-04-14 PROCEDURE — 85014 HEMATOCRIT: CPT

## 2024-04-14 PROCEDURE — 82435 ASSAY OF BLOOD CHLORIDE: CPT

## 2024-04-14 PROCEDURE — 85025 COMPLETE CBC W/AUTO DIFF WBC: CPT

## 2024-04-14 PROCEDURE — 70496 CT ANGIOGRAPHY HEAD: CPT | Mod: MC

## 2024-04-14 PROCEDURE — 85610 PROTHROMBIN TIME: CPT

## 2024-04-14 PROCEDURE — 82947 ASSAY GLUCOSE BLOOD QUANT: CPT

## 2024-04-14 PROCEDURE — 84484 ASSAY OF TROPONIN QUANT: CPT

## 2024-04-14 PROCEDURE — 93010 ELECTROCARDIOGRAM REPORT: CPT

## 2024-04-14 PROCEDURE — 84295 ASSAY OF SERUM SODIUM: CPT

## 2024-04-14 PROCEDURE — 80053 COMPREHEN METABOLIC PANEL: CPT

## 2024-04-14 PROCEDURE — 99285 EMERGENCY DEPT VISIT HI MDM: CPT | Mod: 25

## 2024-04-14 PROCEDURE — 70498 CT ANGIOGRAPHY NECK: CPT | Mod: MC

## 2024-04-14 RX ORDER — METOPROLOL TARTRATE 50 MG
25 TABLET ORAL DAILY
Refills: 0 | Status: DISCONTINUED | OUTPATIENT
Start: 2024-04-14 | End: 2024-04-18

## 2024-04-14 RX ORDER — METOPROLOL TARTRATE 50 MG
25 TABLET ORAL ONCE
Refills: 0 | Status: DISCONTINUED | OUTPATIENT
Start: 2024-04-14 | End: 2024-04-14

## 2024-04-14 RX ORDER — LOSARTAN POTASSIUM 100 MG/1
50 TABLET, FILM COATED ORAL DAILY
Refills: 0 | Status: DISCONTINUED | OUTPATIENT
Start: 2024-04-14 | End: 2024-04-18

## 2024-04-14 RX ORDER — METOPROLOL TARTRATE 50 MG
25 TABLET ORAL ONCE
Refills: 0 | Status: COMPLETED | OUTPATIENT
Start: 2024-04-14 | End: 2024-04-14

## 2024-04-14 RX ORDER — SODIUM CHLORIDE 9 MG/ML
1000 INJECTION INTRAMUSCULAR; INTRAVENOUS; SUBCUTANEOUS ONCE
Refills: 0 | Status: COMPLETED | OUTPATIENT
Start: 2024-04-14 | End: 2024-04-14

## 2024-04-14 RX ADMIN — SODIUM CHLORIDE 1000 MILLILITER(S): 9 INJECTION INTRAMUSCULAR; INTRAVENOUS; SUBCUTANEOUS at 18:46

## 2024-04-14 RX ADMIN — LOSARTAN POTASSIUM 50 MILLIGRAM(S): 100 TABLET, FILM COATED ORAL at 22:17

## 2024-04-14 RX ADMIN — Medication 25 MILLIGRAM(S): at 22:17

## 2024-04-14 NOTE — ED ADULT TRIAGE NOTE - CHIEF COMPLAINT QUOTE
BIBEMS from home with c/o generalized weakness and headache today which was resolved, also reports one episode of diarrhea

## 2024-04-14 NOTE — ED PROVIDER NOTE - PATIENT PORTAL LINK FT
You can access the FollowMyHealth Patient Portal offered by [facility] by registering at the following website: [email or web address]. By joining [employer]’s FollowMyHealth portal, you will also be able to view your health information using other applications (apps) compatible with our system.

## 2024-04-14 NOTE — ED ADULT NURSE NOTE - OBJECTIVE STATEMENT
89 yo presents to the ED from home. A&Ox4 by EMS c/o general weakness and HA today. pt reports at 1445 pt had sever, sudden onset L orbital HA with pressure behind eye. pt reports that she took medication and the pain resolved approximately 2 hours later. reports persistent nausea and generally not feeling well which prompted ED visit. pt reports near syncope. EKG done at bedside. reports 1 episode of diarrhea this AM. denies abd pain. normally ambulates with cane. 20G inserted RAC. no neuro deficits upon exam. following commands well. denies recent fever chills. Patient undressed and placed into gown, call bell in hand and side rails up for safety. warm blanket provided, vital signs stable, pt in no acute distress.

## 2024-04-14 NOTE — ED ADULT NURSE NOTE - NSFALLRISKINTERV_ED_ALL_ED

## 2024-04-14 NOTE — ED PROVIDER NOTE - OBJECTIVE STATEMENT
89-year-old female history of hypertension hyperlipidemia chief complaint of weakness severe headache.  Patient noted to have weakness/diarrhea x 2 no blood this morning went to [employer] after [facility] had severe headache that was sudden onset but resolved after getting Tylenol.  No fevers no chest pain.   Was also complaining of mild epigastric pain.

## 2024-04-14 NOTE — ED PROVIDER NOTE - ATTENDING CONTRIBUTION TO CARE
Attending MD Shalini Campos:  I personally have seen and examined this patient.  Resident note reviewed and agree on plan of care and except where noted.  See HPI, PE, and MDM for details.

## 2024-04-14 NOTE — ED PROVIDER NOTE - HIV OFFER
Pt to ED via PV accompanied by mother. Pt reports that since yesterday, she has had waxing and waning upper generalized abdominal pain. Pt with present colostomy and recent colon resection in February of this year. Pt reports that her colostomy has had normal output and denies nausea and vomiting. Pt rates the pain as 6/10 at its worse. Pt denies urinary symptoms. Pt reports feeling bloated.     This RN in appropriate PPE for all patient care interactions. Pt masked and redirected for proper mask use when necessary. Hand hygiene performed before and after all patient care interactions.     Opt out

## 2024-04-14 NOTE — ED PROVIDER NOTE - CLINICAL SUMMARY MEDICAL DECISION MAKING FREE TEXT BOX
given hx and PE cc for SAH given sudden onset of symptoms / HA weakness possible 2/2 infection will screen as such w/ cxr UA, anemia possible, dehydration 2/2 diarrhea possible as well given hx and PE cc for SAH given sudden onset of symptoms / HA weakness possible 2/2 infection will screen as such w/ cxr UA, anemia possible, dehydration 2/2 diarrhea possible as well  Attending Shalini Campos: 89-year-old female with history of high blood pressure, hyperlipidemia presenting with weakness and headache.  Patient states today started having a temporal headache that was present for some time and then was improved upon arrival to the emergency department.  Also reported having some diarrhea this morning.  Symptoms started while she was at [employer].  Upon arrival to the emergency department patient is awake and alert following commands.  Patient denies any head trauma or falls.  On exam patient is awake moving all extremities with nonfocal neurologic exam.  No temporal tenderness to palpation making temporal arteritis less likely.  No known history of any brain aneurysms.  With concern for significant headache and no prior history of severe headaches CT scans were performed to evaluate for any intracranial hemorrhage versus subarachnoid versus any evidence of any aneurysms.  Patient also endorsed some diarrhea earlier in the day.  On exam abdomen is soft and nontender and patient currently denies any abdominal pain making intra-abdominal process less likely.  Will obtain labs, CT scan and reevaluate.  No sinus tenderness to palpation and no fevers or chills making infectious etiology less likely.

## 2024-04-14 NOTE — ED PROVIDER NOTE - PROGRESS NOTE DETAILS
Shanthi PGY 3   pt made aware of results     Discussed lab and radiology findings with patient. Patient feels comfortable going home. Gave home care and follow up instructions. Discussed which symptoms to look out for and when to return to the ED for further evaluation. Patient given opportunity to ask questions about their medical condition and had all questions answered. Attending Shalini Campos: pt feeling well. family concerned that does have some bruising around where iv was placed. d/w nursing IV placed by ambulance. apologized to family

## 2024-04-14 NOTE — ED ADULT TRIAGE NOTE - HEIGHT IN CM
Discharge Summary    Admission Date: 2023  Discharge Date: 11/3/2023    Discharge Diagnosis  Severe preeclampsia, third trimester    Hospital Course  Delivery Date: 10/31/2023  1:02 PM   Delivery type: , Low Transverse    GA at delivery: 39w3d  Outcome: Living   Anesthesia during delivery: Epidural;General   Intrapartum complications: Seizures During Labor   Feeding method: Breastfeeding Status: Yes     Procedures: none, CS at OSH  Contraception at discharge: none    Pt was transferred on POD#0 s/p urgent pLTCS in the s/o maternal seizure and fetal bradycardia. Pt was started on Mg for seizure ppx and keppra and acyclovir. On chart review she was found to have mild range Bps (thought < 4 hours apart) and labs were notable plts that nadired at 104, most c/w eclampsia following workup. Neurology consulted and workup negative including 24 hour EEG. Acyclovir and Keppra d/c'ed due to low suscipion for encephalitis or seizure disorder. No indication for BP. She received 24 hours of Mg. Blood pressures overall normotensive, no PO agent. Breastfeeding. Declines PPBC. Plan for 1 week BP check and incision check and 4 week postpartum follow up visit. For outpt neurology fuv.    Admission H&P  37 y.o.  at 39w3d admitted for seizure of unknown etiology. Patient presents as a transfer from [facility] after urgent pLTCS on 10/31 in the setting of intrapartum maternal seizure. At approximately 1230pm, patient was found to be unresponsive, eyes closed and rolled back, mouth open, and limbs rigid for about 30 seconds. Had postictal state in which she was disoriented and confused. Fetal bradycardia was noted for 6 minutes and decision was made to proceed with urgent CS, which was uncomplicated, EBL 1140mL.      Patient states that she remembers seeing flashing white lights and then does not remember anything until waking up after her CS. States she does not remember having a headache. Currently denies headache,  vision changes, RUQ pain, chest pain, SOB, numbness, tingling, weakness. Having incisional pain and feeling very tired, otherwise asymptomatic. Had an uncomplicated pregnancy prior to this, denies any personal or family history of HTN, seizure disorder, neurologic problems. Denies history of STIs, cold sores, HSV. Denies substance use. Prior delivery was 38wk  only complicated by FGR.    Pertinent Physical Exam At Time of Discharge  General: Well appearing, alert  HEENT: normocephalic, EOMI, clear sclera  Cardio: Warm and well perfused  Resp: breathing comfortably on room air  Abd: soft, nontender, nondistended, fundus firm, incision c/d/I with steri strips in place  Neuro: grossly intact, no focal deficits  Extremities: full ROM, no calf tenderness  Psych: A&O x3, appropriate mood and affect      Discharge Meds     Your medication list        ASK your doctor about these medications        Instructions Last Dose Given Next Dose Due   cholecalciferol 25 MCG (1000 UT) tablet  Commonly known as: Vitamin D-3           citalopram 40 mg tablet  Commonly known as: CeleXA           Prenatal 27 mg iron-800 mcg folic acid tablet  Generic drug: prenatal vitamin (iron-folic)                     Test Results Pending At Discharge  Pending Labs       No current pending labs.            Outpatient Follow-Up  No future appointments.      Discussed and seen with Dr. Iggy Feliz MD   152.4

## 2024-04-14 NOTE — ED PROVIDER NOTE - NSFOLLOWUPINSTRUCTIONS_ED_ALL_ED_FT
Today you were seen in the ED for weakness     It was found that you have No signs of medical emergency warranting further evaluation in the emergency department.    A copy of your results attached this document below.    Please follow up with Your primary care provider in regards to today's event.    Please return to the ED if you have more severe chest pain or the pain that brought you into the Emergency Dept. returns, an increase in shortness of breath, nausea and / or vomiting associated with your chest pain.

## 2024-04-14 NOTE — ED PROVIDER NOTE - PHYSICAL EXAMINATION
GENERAL: Awake, alert, NAD  LUNGS: non labored breathing   ABDOMEN: Soft, non tender, non distended, no rebound, no guarding  BACK: No midline spinal tenderness, no CVA tenderness  EXT: No edema, no calf tenderness, 2+ DP pulses bilaterally, no deformities.  NEURO: alert, CN 2-12 intact,, sensation intact throughout, coordination shows no abnormalities , finger to nose accomplished b/l , romberg negative, motor 5/5 RUE/LUE/RLE/LLE/EHL/Plantar flexion, ,  SKIN: Warm and dry. No rash.  PSYCH: Normal affect. GENERAL: Awake, alert, NAD  LUNGS: non labored breathing   ABDOMEN: Soft, non tender, non distended, no rebound, no guarding  BACK: No midline spinal tenderness, no CVA tenderness  EXT: No edema, no calf tenderness, 2+ DP pulses bilaterally, no deformities.  NEURO: alert, CN 2-12 intact,, sensation intact throughout, coordination shows no abnormalities , finger to nose accomplished b/l , romberg negative, motor 5/5 RUE/LUE/RLE/LLE/EHL/Plantar flexion, ,  SKIN: Warm and dry. No rash.  PSYCH: Normal affect.  Attending Shalini Campos: Gen: NAD, heent: atrauamtic, eomi, perrla, mmm, no temportal ttp, neck; nttp, no nuchal rigidity, chest: nttp, no crepitus, cv: rrr, , lungs: ctab, abd: soft, nontender, nondistended, no peritoneal signs, , no guarding, ext: wwp, neg homans, skin: no rash, neuro: awake and alert, following commands, speech clear, sensation and strength intact, no focal deficits no protonator drift

## 2024-04-16 LAB
CULTURE RESULTS: SIGNIFICANT CHANGE UP
SPECIMEN SOURCE: SIGNIFICANT CHANGE UP

## 2024-05-21 NOTE — DISCHARGE NOTE ADULT - BECAUSE OF A PHYSICAL, MENTAL OR EMOTIONAL CONDITION, DO YOU HAVE DIFFICULTY DOING  ERRANDS ALONE LIKE VISITING A DOCTOR'S OFFICE OR SHOPPING (15 YEARS AND OLDER)
Please do patient's exercise stress Myoview examination at the [facility] on [address] in [place], [place] prior to the patient's next visit see me.  
No

## 2024-06-11 ENCOUNTER — INPATIENT (INPATIENT)
Facility: HOSPITAL | Age: 88
LOS: 3 days | Discharge: HOME | DRG: 310 | End: 2024-06-15
Attending: INTERNAL MEDICINE | Admitting: INTERNAL MEDICINE
Payer: MEDICARE

## 2024-06-11 VITALS
TEMPERATURE: 98 F | DIASTOLIC BLOOD PRESSURE: 94 MMHG | RESPIRATION RATE: 18 BRPM | WEIGHT: 128.97 LBS | HEIGHT: 60 IN | HEART RATE: 85 BPM | SYSTOLIC BLOOD PRESSURE: 183 MMHG | OXYGEN SATURATION: 99 %

## 2024-06-11 DIAGNOSIS — F41.9 ANXIETY DISORDER, UNSPECIFIED: ICD-10-CM

## 2024-06-11 DIAGNOSIS — Z29.9 ENCOUNTER FOR PROPHYLACTIC MEASURES, UNSPECIFIED: ICD-10-CM

## 2024-06-11 DIAGNOSIS — I47.10 SUPRAVENTRICULAR TACHYCARDIA, UNSPECIFIED: ICD-10-CM

## 2024-06-11 DIAGNOSIS — Z98.49 CATARACT EXTRACTION STATUS, UNSPECIFIED EYE: Chronic | ICD-10-CM

## 2024-06-11 DIAGNOSIS — K21.9 GASTRO-ESOPHAGEAL REFLUX DISEASE WITHOUT ESOPHAGITIS: ICD-10-CM

## 2024-06-11 DIAGNOSIS — Z87.39 PERSONAL HISTORY OF OTHER DISEASES OF THE MUSCULOSKELETAL SYSTEM AND CONNECTIVE TISSUE: ICD-10-CM

## 2024-06-11 DIAGNOSIS — I49.9 CARDIAC ARRHYTHMIA, UNSPECIFIED: ICD-10-CM

## 2024-06-11 DIAGNOSIS — I10 ESSENTIAL (PRIMARY) HYPERTENSION: ICD-10-CM

## 2024-06-11 LAB
ALBUMIN SERPL ELPH-MCNC: 4 G/DL — SIGNIFICANT CHANGE UP (ref 3.3–5)
ALP SERPL-CCNC: 55 U/L — SIGNIFICANT CHANGE UP (ref 40–120)
ALT FLD-CCNC: 15 U/L — SIGNIFICANT CHANGE UP (ref 10–45)
ANION GAP SERPL CALC-SCNC: 14 MMOL/L — SIGNIFICANT CHANGE UP (ref 5–17)
AST SERPL-CCNC: 17 U/L — SIGNIFICANT CHANGE UP (ref 10–40)
BASOPHILS # BLD AUTO: 0.03 K/UL — SIGNIFICANT CHANGE UP (ref 0–0.2)
BASOPHILS NFR BLD AUTO: 0.2 % — SIGNIFICANT CHANGE UP (ref 0–2)
BILIRUB SERPL-MCNC: 0.5 MG/DL — SIGNIFICANT CHANGE UP (ref 0.2–1.2)
BUN SERPL-MCNC: 9 MG/DL — SIGNIFICANT CHANGE UP (ref 7–23)
CALCIUM SERPL-MCNC: 9.8 MG/DL — SIGNIFICANT CHANGE UP (ref 8.4–10.5)
CHLORIDE SERPL-SCNC: 94 MMOL/L — LOW (ref 96–108)
CO2 SERPL-SCNC: 25 MMOL/L — SIGNIFICANT CHANGE UP (ref 22–31)
CREAT SERPL-MCNC: 0.65 MG/DL — SIGNIFICANT CHANGE UP (ref 0.5–1.3)
EGFR: 83 ML/MIN/1.73M2 — SIGNIFICANT CHANGE UP
EGFR: 83 ML/MIN/1.73M2 — SIGNIFICANT CHANGE UP
EOSINOPHIL # BLD AUTO: 0.01 K/UL — SIGNIFICANT CHANGE UP (ref 0–0.5)
EOSINOPHIL NFR BLD AUTO: 0.1 % — SIGNIFICANT CHANGE UP (ref 0–6)
GLUCOSE SERPL-MCNC: 106 MG/DL — HIGH (ref 70–99)
HCT VFR BLD CALC: 39.5 % — SIGNIFICANT CHANGE UP (ref 34.5–45)
HGB BLD-MCNC: 12.6 G/DL — SIGNIFICANT CHANGE UP (ref 11.5–15.5)
IMM GRANULOCYTES NFR BLD AUTO: 0.3 % — SIGNIFICANT CHANGE UP (ref 0–0.9)
LYMPHOCYTES # BLD AUTO: 1.5 K/UL — SIGNIFICANT CHANGE UP (ref 1–3.3)
LYMPHOCYTES # BLD AUTO: 12.4 % — LOW (ref 13–44)
MAGNESIUM SERPL-MCNC: 2 MG/DL — SIGNIFICANT CHANGE UP (ref 1.6–2.6)
MCHC RBC-ENTMCNC: 27.8 PG — SIGNIFICANT CHANGE UP (ref 27–34)
MCHC RBC-ENTMCNC: 31.9 GM/DL — LOW (ref 32–36)
MCV RBC AUTO: 87 FL — SIGNIFICANT CHANGE UP (ref 80–100)
MONOCYTES # BLD AUTO: 1.21 K/UL — HIGH (ref 0–0.9)
MONOCYTES NFR BLD AUTO: 10 % — SIGNIFICANT CHANGE UP (ref 2–14)
NEUTROPHILS # BLD AUTO: 9.29 K/UL — HIGH (ref 1.8–7.4)
NEUTROPHILS NFR BLD AUTO: 77 % — SIGNIFICANT CHANGE UP (ref 43–77)
NRBC # BLD: 0 /100 WBCS — SIGNIFICANT CHANGE UP (ref 0–0)
NRBC BLD-RTO: 0 /100 WBCS — SIGNIFICANT CHANGE UP (ref 0–0)
NT-PROBNP SERPL-SCNC: 433 PG/ML — HIGH (ref 0–300)
PLATELET # BLD AUTO: 324 K/UL — SIGNIFICANT CHANGE UP (ref 150–400)
POTASSIUM SERPL-MCNC: 4 MMOL/L — SIGNIFICANT CHANGE UP (ref 3.5–5.3)
POTASSIUM SERPL-SCNC: 4 MMOL/L — SIGNIFICANT CHANGE UP (ref 3.5–5.3)
PROT SERPL-MCNC: 6.9 G/DL — SIGNIFICANT CHANGE UP (ref 6–8.3)
RBC # BLD: 4.54 M/UL — SIGNIFICANT CHANGE UP (ref 3.8–5.2)
RBC # FLD: 14.4 % — SIGNIFICANT CHANGE UP (ref 10.3–14.5)
SODIUM SERPL-SCNC: 133 MMOL/L — LOW (ref 135–145)
TROPONIN T, HIGH SENSITIVITY RESULT: 18 NG/L — SIGNIFICANT CHANGE UP (ref 0–51)
TROPONIN T, HIGH SENSITIVITY RESULT: 28 NG/L — SIGNIFICANT CHANGE UP (ref 0–51)
WBC # BLD: 12.08 K/UL — HIGH (ref 3.8–10.5)
WBC # FLD AUTO: 12.08 K/UL — HIGH (ref 3.8–10.5)

## 2024-06-11 PROCEDURE — 71045 X-RAY EXAM CHEST 1 VIEW: CPT | Mod: 26

## 2024-06-11 PROCEDURE — 99223 1ST HOSP IP/OBS HIGH 75: CPT

## 2024-06-11 PROCEDURE — 99285 EMERGENCY DEPT VISIT HI MDM: CPT | Mod: GC

## 2024-06-11 RX ORDER — LOSARTAN POTASSIUM 100 MG/1
1 TABLET, FILM COATED ORAL
Qty: 0 | Refills: 0 | DISCHARGE

## 2024-06-11 RX ORDER — LOSARTAN POTASSIUM 100 MG/1
50 TABLET, FILM COATED ORAL DAILY
Refills: 0 | Status: DISCONTINUED | OUTPATIENT
Start: 2024-06-11 | End: 2024-06-15

## 2024-06-11 RX ORDER — METOPROLOL SUCCINATE 50 MG/1
50 TABLET, EXTENDED RELEASE ORAL AT BEDTIME
Refills: 0 | Status: DISCONTINUED | OUTPATIENT
Start: 2024-06-11 | End: 2024-06-12

## 2024-06-11 RX ORDER — ENOXAPARIN SODIUM 100 MG/ML
40 INJECTION SUBCUTANEOUS EVERY 24 HOURS
Refills: 0 | Status: DISCONTINUED | OUTPATIENT
Start: 2024-06-11 | End: 2024-06-13

## 2024-06-11 RX ORDER — CHOLECALCIFEROL (VITAMIN D3) 125 MCG
1 CAPSULE ORAL
Qty: 0 | Refills: 0 | DISCHARGE

## 2024-06-11 RX ORDER — ALPRAZOLAM 0.25 MG
0 TABLET ORAL
Qty: 0 | Refills: 0 | DISCHARGE

## 2024-06-11 RX ORDER — ACETAMINOPHEN 500 MG/5ML
650 LIQUID (ML) ORAL EVERY 6 HOURS
Refills: 0 | Status: DISCONTINUED | OUTPATIENT
Start: 2024-06-11 | End: 2024-06-15

## 2024-06-11 RX ORDER — OMEPRAZOLE 10 MG/1
1 CAPSULE, DELAYED RELEASE ORAL
Qty: 0 | Refills: 0 | DISCHARGE

## 2024-06-11 RX ORDER — PREDNISONE 20 MG/1
3 TABLET ORAL DAILY
Refills: 0 | Status: DISCONTINUED | OUTPATIENT
Start: 2024-06-11 | End: 2024-06-15

## 2024-06-11 RX ORDER — ASPIRIN/CALCIUM CARB/MAGNESIUM 324 MG
1 TABLET ORAL
Qty: 0 | Refills: 0 | DISCHARGE

## 2024-06-11 RX ORDER — ALPRAZOLAM 0.5 MG
0.25 TABLET, EXTENDED RELEASE 24 HR ORAL DAILY
Refills: 0 | Status: DISCONTINUED | OUTPATIENT
Start: 2024-06-11 | End: 2024-06-15

## 2024-06-11 RX ADMIN — LOSARTAN POTASSIUM 50 MILLIGRAM(S): 100 TABLET, FILM COATED ORAL at 21:49

## 2024-06-11 RX ADMIN — ENOXAPARIN SODIUM 40 MILLIGRAM(S): 100 INJECTION SUBCUTANEOUS at 21:49

## 2024-06-12 ENCOUNTER — RESULT REVIEW (OUTPATIENT)
Age: 89
End: 2024-06-12

## 2024-06-12 LAB
ANION GAP SERPL CALC-SCNC: 10 MMOL/L — SIGNIFICANT CHANGE UP (ref 5–17)
BUN SERPL-MCNC: 9 MG/DL — SIGNIFICANT CHANGE UP (ref 7–23)
CALCIUM SERPL-MCNC: 9.2 MG/DL — SIGNIFICANT CHANGE UP (ref 8.4–10.5)
CHLORIDE SERPL-SCNC: 97 MMOL/L — SIGNIFICANT CHANGE UP (ref 96–108)
CO2 SERPL-SCNC: 26 MMOL/L — SIGNIFICANT CHANGE UP (ref 22–31)
CREAT SERPL-MCNC: 0.67 MG/DL — SIGNIFICANT CHANGE UP (ref 0.5–1.3)
EGFR: 82 ML/MIN/1.73M2 — SIGNIFICANT CHANGE UP
EGFR: 82 ML/MIN/1.73M2 — SIGNIFICANT CHANGE UP
GLUCOSE SERPL-MCNC: 93 MG/DL — SIGNIFICANT CHANGE UP (ref 70–99)
HCT VFR BLD CALC: 34.9 % — SIGNIFICANT CHANGE UP (ref 34.5–45)
HGB BLD-MCNC: 11.2 G/DL — LOW (ref 11.5–15.5)
MAGNESIUM SERPL-MCNC: 2.1 MG/DL — SIGNIFICANT CHANGE UP (ref 1.6–2.6)
MCHC RBC-ENTMCNC: 28.1 PG — SIGNIFICANT CHANGE UP (ref 27–34)
MCHC RBC-ENTMCNC: 32.1 GM/DL — SIGNIFICANT CHANGE UP (ref 32–36)
MCV RBC AUTO: 87.7 FL — SIGNIFICANT CHANGE UP (ref 80–100)
NRBC # BLD: 0 /100 WBCS — SIGNIFICANT CHANGE UP (ref 0–0)
NRBC BLD-RTO: 0 /100 WBCS — SIGNIFICANT CHANGE UP (ref 0–0)
PHOSPHATE SERPL-MCNC: 3.8 MG/DL — SIGNIFICANT CHANGE UP (ref 2.5–4.5)
PLATELET # BLD AUTO: 282 K/UL — SIGNIFICANT CHANGE UP (ref 150–400)
POTASSIUM SERPL-MCNC: 4.2 MMOL/L — SIGNIFICANT CHANGE UP (ref 3.5–5.3)
POTASSIUM SERPL-SCNC: 4.2 MMOL/L — SIGNIFICANT CHANGE UP (ref 3.5–5.3)
RBC # BLD: 3.98 M/UL — SIGNIFICANT CHANGE UP (ref 3.8–5.2)
RBC # FLD: 14.4 % — SIGNIFICANT CHANGE UP (ref 10.3–14.5)
SODIUM SERPL-SCNC: 133 MMOL/L — LOW (ref 135–145)
T3 SERPL-MCNC: 118 NG/DL — SIGNIFICANT CHANGE UP (ref 80–200)
T4 FREE SERPL-MCNC: 1 NG/DL — SIGNIFICANT CHANGE UP (ref 0.9–1.8)
TSH SERPL-MCNC: 2.17 UIU/ML — SIGNIFICANT CHANGE UP (ref 0.27–4.2)
TSH SERPL-MCNC: 2.93 UIU/ML — SIGNIFICANT CHANGE UP (ref 0.27–4.2)
WBC # BLD: 7.4 K/UL — SIGNIFICANT CHANGE UP (ref 3.8–10.5)
WBC # FLD AUTO: 7.4 K/UL — SIGNIFICANT CHANGE UP (ref 3.8–10.5)

## 2024-06-12 PROCEDURE — 99232 SBSQ HOSP IP/OBS MODERATE 35: CPT

## 2024-06-12 PROCEDURE — 93306 TTE W/DOPPLER COMPLETE: CPT | Mod: 26

## 2024-06-12 PROCEDURE — 93010 ELECTROCARDIOGRAM REPORT: CPT

## 2024-06-12 RX ORDER — METOPROLOL SUCCINATE 50 MG/1
25 TABLET, EXTENDED RELEASE ORAL AT BEDTIME
Refills: 0 | Status: COMPLETED | OUTPATIENT
Start: 2024-06-12 | End: 2024-06-12

## 2024-06-12 RX ADMIN — ENOXAPARIN SODIUM 40 MILLIGRAM(S): 100 INJECTION SUBCUTANEOUS at 21:21

## 2024-06-12 RX ADMIN — Medication 0.25 MILLIGRAM(S): at 19:39

## 2024-06-12 RX ADMIN — Medication 1 GRAM(S): at 17:57

## 2024-06-12 RX ADMIN — Medication 40 MILLIGRAM(S): at 05:23

## 2024-06-12 RX ADMIN — Medication 650 MILLIGRAM(S): at 04:42

## 2024-06-12 RX ADMIN — Medication 240 MILLIGRAM(S): at 05:23

## 2024-06-12 RX ADMIN — Medication 650 MILLIGRAM(S): at 04:07

## 2024-06-12 RX ADMIN — LOSARTAN POTASSIUM 50 MILLIGRAM(S): 100 TABLET, FILM COATED ORAL at 17:56

## 2024-06-12 RX ADMIN — PREDNISONE 3 MILLIGRAM(S): 20 TABLET ORAL at 12:26

## 2024-06-12 RX ADMIN — METOPROLOL SUCCINATE 25 MILLIGRAM(S): 50 TABLET, EXTENDED RELEASE ORAL at 21:21

## 2024-06-13 LAB
ANION GAP SERPL CALC-SCNC: 11 MMOL/L — SIGNIFICANT CHANGE UP (ref 5–17)
ANION GAP SERPL CALC-SCNC: 12 MMOL/L — SIGNIFICANT CHANGE UP (ref 5–17)
BLD GP AB SCN SERPL QL: NEGATIVE — SIGNIFICANT CHANGE UP
BUN SERPL-MCNC: 10 MG/DL — SIGNIFICANT CHANGE UP (ref 7–23)
BUN SERPL-MCNC: 11 MG/DL — SIGNIFICANT CHANGE UP (ref 7–23)
CALCIUM SERPL-MCNC: 9.4 MG/DL — SIGNIFICANT CHANGE UP (ref 8.4–10.5)
CALCIUM SERPL-MCNC: 9.9 MG/DL — SIGNIFICANT CHANGE UP (ref 8.4–10.5)
CHLORIDE SERPL-SCNC: 98 MMOL/L — SIGNIFICANT CHANGE UP (ref 96–108)
CHLORIDE SERPL-SCNC: 98 MMOL/L — SIGNIFICANT CHANGE UP (ref 96–108)
CO2 SERPL-SCNC: 25 MMOL/L — SIGNIFICANT CHANGE UP (ref 22–31)
CO2 SERPL-SCNC: 25 MMOL/L — SIGNIFICANT CHANGE UP (ref 22–31)
CREAT SERPL-MCNC: 0.68 MG/DL — SIGNIFICANT CHANGE UP (ref 0.5–1.3)
CREAT SERPL-MCNC: 0.69 MG/DL — SIGNIFICANT CHANGE UP (ref 0.5–1.3)
EGFR: 82 ML/MIN/1.73M2 — SIGNIFICANT CHANGE UP
GLUCOSE SERPL-MCNC: 144 MG/DL — HIGH (ref 70–99)
GLUCOSE SERPL-MCNC: 95 MG/DL — SIGNIFICANT CHANGE UP (ref 70–99)
MAGNESIUM SERPL-MCNC: 2 MG/DL — SIGNIFICANT CHANGE UP (ref 1.6–2.6)
PHOSPHATE SERPL-MCNC: 2.8 MG/DL — SIGNIFICANT CHANGE UP (ref 2.5–4.5)
POTASSIUM SERPL-MCNC: 4 MMOL/L — SIGNIFICANT CHANGE UP (ref 3.5–5.3)
POTASSIUM SERPL-MCNC: 4.3 MMOL/L — SIGNIFICANT CHANGE UP (ref 3.5–5.3)
POTASSIUM SERPL-SCNC: 4 MMOL/L — SIGNIFICANT CHANGE UP (ref 3.5–5.3)
POTASSIUM SERPL-SCNC: 4.3 MMOL/L — SIGNIFICANT CHANGE UP (ref 3.5–5.3)
RH IG SCN BLD-IMP: POSITIVE — SIGNIFICANT CHANGE UP
RH IG SCN BLD-IMP: POSITIVE — SIGNIFICANT CHANGE UP
SODIUM SERPL-SCNC: 134 MMOL/L — LOW (ref 135–145)
SODIUM SERPL-SCNC: 135 MMOL/L — SIGNIFICANT CHANGE UP (ref 135–145)

## 2024-06-13 PROCEDURE — 99232 SBSQ HOSP IP/OBS MODERATE 35: CPT

## 2024-06-13 RX ORDER — METOPROLOL SUCCINATE 50 MG/1
5 TABLET, EXTENDED RELEASE ORAL ONCE
Refills: 0 | Status: COMPLETED | OUTPATIENT
Start: 2024-06-13 | End: 2024-06-13

## 2024-06-13 RX ORDER — CYANOCOBALAMIN 1000 UG/ML
2000 INJECTION INTRAMUSCULAR; SUBCUTANEOUS DAILY
Refills: 0 | Status: DISCONTINUED | OUTPATIENT
Start: 2024-06-13 | End: 2024-06-15

## 2024-06-13 RX ADMIN — Medication 0.25 MILLIGRAM(S): at 14:23

## 2024-06-13 RX ADMIN — PREDNISONE 3 MILLIGRAM(S): 20 TABLET ORAL at 12:21

## 2024-06-13 RX ADMIN — LOSARTAN POTASSIUM 50 MILLIGRAM(S): 100 TABLET, FILM COATED ORAL at 17:11

## 2024-06-13 RX ADMIN — CYANOCOBALAMIN 2000 MICROGRAM(S): 1000 INJECTION INTRAMUSCULAR; SUBCUTANEOUS at 12:21

## 2024-06-13 RX ADMIN — METOPROLOL SUCCINATE 5 MILLIGRAM(S): 50 TABLET, EXTENDED RELEASE ORAL at 19:53

## 2024-06-13 RX ADMIN — Medication 1 GRAM(S): at 12:22

## 2024-06-13 RX ADMIN — Medication 40 MILLIGRAM(S): at 05:05

## 2024-06-14 PROCEDURE — 93653 COMPRE EP EVAL TX SVT: CPT

## 2024-06-14 PROCEDURE — 93010 ELECTROCARDIOGRAM REPORT: CPT

## 2024-06-14 PROCEDURE — 93623 PRGRMD STIMJ&PACG IV RX NFS: CPT | Mod: 26

## 2024-06-14 PROCEDURE — 99232 SBSQ HOSP IP/OBS MODERATE 35: CPT

## 2024-06-14 RX ORDER — METOPROLOL SUCCINATE 50 MG/1
25 TABLET, EXTENDED RELEASE ORAL DAILY
Refills: 0 | Status: DISCONTINUED | OUTPATIENT
Start: 2024-06-14 | End: 2024-06-15

## 2024-06-14 RX ADMIN — PREDNISONE 3 MILLIGRAM(S): 20 TABLET ORAL at 12:02

## 2024-06-14 RX ADMIN — Medication 1 GRAM(S): at 12:03

## 2024-06-14 RX ADMIN — CYANOCOBALAMIN 2000 MICROGRAM(S): 1000 INJECTION INTRAMUSCULAR; SUBCUTANEOUS at 12:03

## 2024-06-14 RX ADMIN — Medication 40 MILLIGRAM(S): at 05:07

## 2024-06-14 RX ADMIN — METOPROLOL SUCCINATE 25 MILLIGRAM(S): 50 TABLET, EXTENDED RELEASE ORAL at 17:49

## 2024-06-14 RX ADMIN — Medication 0.25 MILLIGRAM(S): at 21:18

## 2024-06-14 RX ADMIN — LOSARTAN POTASSIUM 50 MILLIGRAM(S): 100 TABLET, FILM COATED ORAL at 17:49

## 2024-06-14 NOTE — PATIENT PROFILE ADULT. - ABILITY TO HEAR (WITH HEARING AID OR HEARING APPLIANCE IF NORMALLY USED):
Adequate: hears normal conversation without difficulty [phone].

## 2024-06-15 ENCOUNTER — TRANSCRIPTION ENCOUNTER (OUTPATIENT)
Age: 89
End: 2024-06-15

## 2024-06-15 VITALS
DIASTOLIC BLOOD PRESSURE: 85 MMHG | HEART RATE: 63 BPM | SYSTOLIC BLOOD PRESSURE: 146 MMHG | RESPIRATION RATE: 18 BRPM | OXYGEN SATURATION: 97 % | TEMPERATURE: 98 F

## 2024-06-15 PROBLEM — I47.10 SUPRAVENTRICULAR TACHYCARDIA, UNSPECIFIED: Chronic | Status: ACTIVE | Noted: 2024-06-11

## 2024-06-15 PROBLEM — K58.9 IRRITABLE BOWEL SYNDROME WITHOUT DIARRHEA: Chronic | Status: ACTIVE | Noted: 2024-06-11

## 2024-06-15 PROBLEM — K58.9 IRRITABLE BOWEL SYNDROME, UNSPECIFIED: Chronic | Status: ACTIVE | Noted: 2024-06-11

## 2024-06-15 LAB
ANION GAP SERPL CALC-SCNC: 10 MMOL/L — SIGNIFICANT CHANGE UP (ref 5–17)
BUN SERPL-MCNC: 9 MG/DL — SIGNIFICANT CHANGE UP (ref 7–23)
CALCIUM SERPL-MCNC: 9.6 MG/DL — SIGNIFICANT CHANGE UP (ref 8.4–10.5)
CHLORIDE SERPL-SCNC: 100 MMOL/L — SIGNIFICANT CHANGE UP (ref 96–108)
CO2 SERPL-SCNC: 25 MMOL/L — SIGNIFICANT CHANGE UP (ref 22–31)
CREAT SERPL-MCNC: 0.71 MG/DL — SIGNIFICANT CHANGE UP (ref 0.5–1.3)
EGFR: 80 ML/MIN/1.73M2 — SIGNIFICANT CHANGE UP
EGFR: 80 ML/MIN/1.73M2 — SIGNIFICANT CHANGE UP
GLUCOSE SERPL-MCNC: 80 MG/DL — SIGNIFICANT CHANGE UP (ref 70–99)
HCT VFR BLD CALC: 36.3 % — SIGNIFICANT CHANGE UP (ref 34.5–45)
HGB BLD-MCNC: 11.5 G/DL — SIGNIFICANT CHANGE UP (ref 11.5–15.5)
MCHC RBC-ENTMCNC: 27.9 PG — SIGNIFICANT CHANGE UP (ref 27–34)
MCHC RBC-ENTMCNC: 31.7 GM/DL — LOW (ref 32–36)
MCV RBC AUTO: 88.1 FL — SIGNIFICANT CHANGE UP (ref 80–100)
NRBC # BLD: 0 /100 WBCS — SIGNIFICANT CHANGE UP (ref 0–0)
NRBC BLD-RTO: 0 /100 WBCS — SIGNIFICANT CHANGE UP (ref 0–0)
PLATELET # BLD AUTO: 265 K/UL — SIGNIFICANT CHANGE UP (ref 150–400)
POTASSIUM SERPL-MCNC: 4.5 MMOL/L — SIGNIFICANT CHANGE UP (ref 3.5–5.3)
POTASSIUM SERPL-SCNC: 4.5 MMOL/L — SIGNIFICANT CHANGE UP (ref 3.5–5.3)
RBC # BLD: 4.12 M/UL — SIGNIFICANT CHANGE UP (ref 3.8–5.2)
RBC # FLD: 14.4 % — SIGNIFICANT CHANGE UP (ref 10.3–14.5)
SODIUM SERPL-SCNC: 135 MMOL/L — SIGNIFICANT CHANGE UP (ref 135–145)
WBC # BLD: 9.15 K/UL — SIGNIFICANT CHANGE UP (ref 3.8–10.5)
WBC # FLD AUTO: 9.15 K/UL — SIGNIFICANT CHANGE UP (ref 3.8–10.5)

## 2024-06-15 PROCEDURE — 83880 ASSAY OF NATRIURETIC PEPTIDE: CPT

## 2024-06-15 PROCEDURE — 93653 COMPRE EP EVAL TX SVT: CPT

## 2024-06-15 PROCEDURE — 84443 ASSAY THYROID STIM HORMONE: CPT

## 2024-06-15 PROCEDURE — C1730: CPT

## 2024-06-15 PROCEDURE — C1893: CPT

## 2024-06-15 PROCEDURE — 85025 COMPLETE CBC W/AUTO DIFF WBC: CPT

## 2024-06-15 PROCEDURE — 86901 BLOOD TYPING SEROLOGIC RH(D): CPT

## 2024-06-15 PROCEDURE — C1894: CPT

## 2024-06-15 PROCEDURE — 86900 BLOOD TYPING SEROLOGIC ABO: CPT

## 2024-06-15 PROCEDURE — 71045 X-RAY EXAM CHEST 1 VIEW: CPT

## 2024-06-15 PROCEDURE — 86850 RBC ANTIBODY SCREEN: CPT

## 2024-06-15 PROCEDURE — 93010 ELECTROCARDIOGRAM REPORT: CPT

## 2024-06-15 PROCEDURE — 93623 PRGRMD STIMJ&PACG IV RX NFS: CPT

## 2024-06-15 PROCEDURE — C1732: CPT

## 2024-06-15 PROCEDURE — 84480 ASSAY TRIIODOTHYRONINE (T3): CPT

## 2024-06-15 PROCEDURE — 80053 COMPREHEN METABOLIC PANEL: CPT

## 2024-06-15 PROCEDURE — 93005 ELECTROCARDIOGRAM TRACING: CPT

## 2024-06-15 PROCEDURE — 80048 BASIC METABOLIC PNL TOTAL CA: CPT

## 2024-06-15 PROCEDURE — 83735 ASSAY OF MAGNESIUM: CPT

## 2024-06-15 PROCEDURE — 99285 EMERGENCY DEPT VISIT HI MDM: CPT

## 2024-06-15 PROCEDURE — 93306 TTE W/DOPPLER COMPLETE: CPT

## 2024-06-15 PROCEDURE — 84439 ASSAY OF FREE THYROXINE: CPT

## 2024-06-15 PROCEDURE — 36415 COLL VENOUS BLD VENIPUNCTURE: CPT

## 2024-06-15 PROCEDURE — 85027 COMPLETE CBC AUTOMATED: CPT

## 2024-06-15 PROCEDURE — 84100 ASSAY OF PHOSPHORUS: CPT

## 2024-06-15 PROCEDURE — 84484 ASSAY OF TROPONIN QUANT: CPT

## 2024-06-15 RX ORDER — CYANOCOBALAMIN (VITAMIN B-12) 1000MCG/15
0 LIQUID (ML) ORAL
Refills: 0 | DISCHARGE
Start: 2024-06-15 | End: 2024-07-14

## 2024-06-15 RX ORDER — CYANOCOBALAMIN 1000 UG/ML
2 INJECTION INTRAMUSCULAR; SUBCUTANEOUS
Qty: 60 | Refills: 0
Start: 2024-06-15 | End: 2024-07-14

## 2024-06-15 RX ORDER — METOPROLOL SUCCINATE 50 MG/1
1 TABLET, EXTENDED RELEASE ORAL
Qty: 30 | Refills: 0
Start: 2024-06-15 | End: 2024-07-14

## 2024-06-15 RX ORDER — LOSARTAN POTASSIUM 100 MG/1
1 TABLET, FILM COATED ORAL
Refills: 0 | DISCHARGE

## 2024-06-15 RX ORDER — METOPROLOL TARTRATE 50 MG
1 TABLET ORAL
Qty: 30 | Refills: 0 | DISCHARGE
Start: 2024-06-15 | End: 2024-07-14

## 2024-06-15 RX ORDER — VERAPAMIL HCL 240 MG
1 CAPSULE, EXTENDED RELEASE PELLETS 24 HR ORAL
Qty: 0 | Refills: 0 | DISCHARGE

## 2024-06-15 RX ADMIN — CYANOCOBALAMIN 2000 MICROGRAM(S): 1000 INJECTION INTRAMUSCULAR; SUBCUTANEOUS at 13:20

## 2024-06-15 RX ADMIN — Medication 40 MILLIGRAM(S): at 06:05

## 2024-06-15 RX ADMIN — PREDNISONE 3 MILLIGRAM(S): 20 TABLET ORAL at 13:21

## 2024-06-15 RX ADMIN — METOPROLOL SUCCINATE 25 MILLIGRAM(S): 50 TABLET, EXTENDED RELEASE ORAL at 06:05

## 2024-06-15 RX ADMIN — Medication 1 GRAM(S): at 13:21

## 2024-06-16 ENCOUNTER — TRANSCRIPTION ENCOUNTER (OUTPATIENT)
Age: 89
End: 2024-06-16

## 2024-06-27 DIAGNOSIS — I47.10 SUPRAVENTRICULAR TACHYCARDIA, UNSPECIFIED: ICD-10-CM

## 2024-07-01 ENCOUNTER — APPOINTMENT (OUTPATIENT)
Dept: ELECTROPHYSIOLOGY | Facility: CLINIC | Age: 89
End: 2024-07-01

## 2024-07-25 PROCEDURE — 93248 EXT ECG>7D<15D REV&INTERPJ: CPT

## 2024-07-31 ENCOUNTER — APPOINTMENT (OUTPATIENT)
Dept: ELECTROPHYSIOLOGY | Facility: CLINIC | Age: 89
End: 2024-07-31
Payer: MEDICARE

## 2024-07-31 ENCOUNTER — NON-APPOINTMENT (OUTPATIENT)
Age: 89
End: 2024-07-31

## 2024-07-31 VITALS
BODY MASS INDEX: 25.32 KG/M2 | HEART RATE: 68 BPM | WEIGHT: 129 LBS | DIASTOLIC BLOOD PRESSURE: 77 MMHG | OXYGEN SATURATION: 98 % | SYSTOLIC BLOOD PRESSURE: 168 MMHG | HEIGHT: 60 IN

## 2024-07-31 DIAGNOSIS — I47.10 SUPRAVENTRICULAR TACHYCARDIA, UNSPECIFIED: ICD-10-CM

## 2024-07-31 PROCEDURE — 93000 ELECTROCARDIOGRAM COMPLETE: CPT

## 2024-07-31 PROCEDURE — 99213 OFFICE O/P EST LOW 20 MIN: CPT

## 2024-07-31 RX ORDER — PREDNISONE 1 MG/1
1 TABLET ORAL
Refills: 0 | Status: ACTIVE | COMMUNITY
Start: 2024-07-31

## 2024-07-31 RX ORDER — LOSARTAN POTASSIUM 50 MG/1
50 TABLET, FILM COATED ORAL DAILY
Qty: 90 | Refills: 1 | Status: ACTIVE | COMMUNITY
Start: 2024-07-31

## 2024-07-31 RX ORDER — NORMAL SALT TABLETS 1 G/G
1 TABLET ORAL
Refills: 0 | Status: ACTIVE | COMMUNITY
Start: 2024-07-31

## 2024-07-31 RX ORDER — METOPROLOL SUCCINATE 25 MG/1
25 TABLET, EXTENDED RELEASE ORAL
Refills: 0 | Status: ACTIVE | COMMUNITY
Start: 2024-07-31

## 2024-07-31 RX ORDER — ALPRAZOLAM 0.25 MG/1
0.25 TABLET ORAL
Refills: 0 | Status: ACTIVE | COMMUNITY
Start: 2024-07-31

## 2024-07-31 RX ORDER — PEPPERMINT OIL 90 MG
90 CAPSULE, DELAYED, AND EXTENDED RELEASE ORAL
Refills: 0 | Status: ACTIVE | COMMUNITY
Start: 2024-07-31

## 2024-07-31 RX ORDER — AMLODIPINE BESYLATE 5 MG/1
5 TABLET ORAL
Qty: 90 | Refills: 1 | Status: ACTIVE | COMMUNITY
Start: 2024-07-31

## 2024-07-31 RX ORDER — TORSEMIDE 5 MG/1
5 TABLET ORAL
Refills: 0 | Status: ACTIVE | COMMUNITY
Start: 2024-07-31

## 2024-07-31 RX ORDER — OMEPRAZOLE MAGNESIUM 20 MG/1
20 TABLET, DELAYED RELEASE ORAL DAILY
Refills: 0 | Status: ACTIVE | COMMUNITY
Start: 2024-07-31

## 2024-07-31 RX ORDER — MULTIVIT-MIN/FOLIC/VIT K/LYCOP 400-300MCG
25 MCG TABLET ORAL DAILY
Refills: 0 | Status: ACTIVE | COMMUNITY
Start: 2024-07-31

## 2024-07-31 NOTE — DISCUSSION/SUMMARY
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 She feels much better since ablation. She can follow up with Dr. Figueroa and see me on a prn basis.  [EKG obtained to assist in diagnosis and management of assessed problem(s)] : EKG obtained to assist in diagnosis and management of assessed problem(s)

## 2024-07-31 NOTE — HISTORY OF PRESENT ILLNESS
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 Post procedure 2 weeks ZIO showed no SVT and mild symptoms during sinus tachycardia at 110 bpm. Rare PVC's. Rare 2nd degree type 1 block at during sleep.  ECG today shows sinus with VA of 210 ms. Blood pressure controlled with amlodipine and ARB. No syncope. was on a month of 1/2 dose apixaban for DVT prophylaxis post ablation.

## 2024-08-22 ENCOUNTER — NON-APPOINTMENT (OUTPATIENT)
Age: 89
End: 2024-08-22

## 2024-10-21 ENCOUNTER — INPATIENT (INPATIENT)
Facility: HOSPITAL | Age: 89
LOS: 2 days | Discharge: HOME HEALTH | DRG: 312 | End: 2024-10-24
Attending: INTERNAL MEDICINE | Admitting: INTERNAL MEDICINE
Payer: MEDICARE

## 2024-10-21 VITALS
OXYGEN SATURATION: 99 % | RESPIRATION RATE: 16 BRPM | WEIGHT: 126.1 LBS | TEMPERATURE: 98 F | SYSTOLIC BLOOD PRESSURE: 145 MMHG | DIASTOLIC BLOOD PRESSURE: 74 MMHG | HEART RATE: 78 BPM | HEIGHT: 65 IN

## 2024-10-21 DIAGNOSIS — M35.3 POLYMYALGIA RHEUMATICA: ICD-10-CM

## 2024-10-21 DIAGNOSIS — R26.2 DIFFICULTY IN WALKING, NOT ELSEWHERE CLASSIFIED: ICD-10-CM

## 2024-10-21 DIAGNOSIS — R55 SYNCOPE AND COLLAPSE: ICD-10-CM

## 2024-10-21 DIAGNOSIS — I10 ESSENTIAL (PRIMARY) HYPERTENSION: ICD-10-CM

## 2024-10-21 DIAGNOSIS — Z29.9 ENCOUNTER FOR PROPHYLACTIC MEASURES, UNSPECIFIED: ICD-10-CM

## 2024-10-21 DIAGNOSIS — R19.7 DIARRHEA, UNSPECIFIED: ICD-10-CM

## 2024-10-21 DIAGNOSIS — Z98.49 CATARACT EXTRACTION STATUS, UNSPECIFIED EYE: Chronic | ICD-10-CM

## 2024-10-21 DIAGNOSIS — I47.10 SUPRAVENTRICULAR TACHYCARDIA, UNSPECIFIED: ICD-10-CM

## 2024-10-21 LAB
ALBUMIN SERPL ELPH-MCNC: 3.7 G/DL — SIGNIFICANT CHANGE UP (ref 3.3–5)
ALP SERPL-CCNC: 49 U/L — SIGNIFICANT CHANGE UP (ref 40–120)
ALT FLD-CCNC: 10 U/L — SIGNIFICANT CHANGE UP (ref 10–45)
ANION GAP SERPL CALC-SCNC: 10 MMOL/L — SIGNIFICANT CHANGE UP (ref 5–17)
APPEARANCE UR: CLEAR — SIGNIFICANT CHANGE UP
AST SERPL-CCNC: 17 U/L — SIGNIFICANT CHANGE UP (ref 10–40)
BACTERIA # UR AUTO: NEGATIVE /HPF — SIGNIFICANT CHANGE UP
BASOPHILS # BLD AUTO: 0.02 K/UL — SIGNIFICANT CHANGE UP (ref 0–0.2)
BASOPHILS NFR BLD AUTO: 0.2 % — SIGNIFICANT CHANGE UP (ref 0–2)
BILIRUB SERPL-MCNC: 0.3 MG/DL — SIGNIFICANT CHANGE UP (ref 0.2–1.2)
BILIRUB UR-MCNC: NEGATIVE — SIGNIFICANT CHANGE UP
BUN SERPL-MCNC: 8 MG/DL — SIGNIFICANT CHANGE UP (ref 7–23)
CALCIUM SERPL-MCNC: 9.1 MG/DL — SIGNIFICANT CHANGE UP (ref 8.4–10.5)
CAST: 1 /LPF — SIGNIFICANT CHANGE UP (ref 0–4)
CHLORIDE SERPL-SCNC: 97 MMOL/L — SIGNIFICANT CHANGE UP (ref 96–108)
CO2 SERPL-SCNC: 26 MMOL/L — SIGNIFICANT CHANGE UP (ref 22–31)
COLOR SPEC: YELLOW — SIGNIFICANT CHANGE UP
CREAT SERPL-MCNC: 0.65 MG/DL — SIGNIFICANT CHANGE UP (ref 0.5–1.3)
DIFF PNL FLD: NEGATIVE — SIGNIFICANT CHANGE UP
EGFR: 83 ML/MIN/1.73M2 — SIGNIFICANT CHANGE UP
EOSINOPHIL # BLD AUTO: 0.06 K/UL — SIGNIFICANT CHANGE UP (ref 0–0.5)
EOSINOPHIL NFR BLD AUTO: 0.7 % — SIGNIFICANT CHANGE UP (ref 0–6)
GLUCOSE SERPL-MCNC: 136 MG/DL — HIGH (ref 70–99)
GLUCOSE UR QL: NEGATIVE MG/DL — SIGNIFICANT CHANGE UP
HCT VFR BLD CALC: 33.5 % — LOW (ref 34.5–45)
HGB BLD-MCNC: 10.7 G/DL — LOW (ref 11.5–15.5)
IMM GRANULOCYTES NFR BLD AUTO: 0.3 % — SIGNIFICANT CHANGE UP (ref 0–0.9)
KETONES UR-MCNC: NEGATIVE MG/DL — SIGNIFICANT CHANGE UP
LEUKOCYTE ESTERASE UR-ACNC: ABNORMAL
LYMPHOCYTES # BLD AUTO: 1.43 K/UL — SIGNIFICANT CHANGE UP (ref 1–3.3)
LYMPHOCYTES # BLD AUTO: 15.9 % — SIGNIFICANT CHANGE UP (ref 13–44)
MCHC RBC-ENTMCNC: 27 PG — SIGNIFICANT CHANGE UP (ref 27–34)
MCHC RBC-ENTMCNC: 31.9 GM/DL — LOW (ref 32–36)
MCV RBC AUTO: 84.4 FL — SIGNIFICANT CHANGE UP (ref 80–100)
MONOCYTES # BLD AUTO: 0.97 K/UL — HIGH (ref 0–0.9)
MONOCYTES NFR BLD AUTO: 10.8 % — SIGNIFICANT CHANGE UP (ref 2–14)
NEUTROPHILS # BLD AUTO: 6.51 K/UL — SIGNIFICANT CHANGE UP (ref 1.8–7.4)
NEUTROPHILS NFR BLD AUTO: 72.1 % — SIGNIFICANT CHANGE UP (ref 43–77)
NITRITE UR-MCNC: NEGATIVE — SIGNIFICANT CHANGE UP
NRBC # BLD: 0 /100 WBCS — SIGNIFICANT CHANGE UP (ref 0–0)
PH UR: 7.5 — SIGNIFICANT CHANGE UP (ref 5–8)
PLATELET # BLD AUTO: 277 K/UL — SIGNIFICANT CHANGE UP (ref 150–400)
POTASSIUM SERPL-MCNC: 4.1 MMOL/L — SIGNIFICANT CHANGE UP (ref 3.5–5.3)
POTASSIUM SERPL-SCNC: 4.1 MMOL/L — SIGNIFICANT CHANGE UP (ref 3.5–5.3)
PROT SERPL-MCNC: 6.3 G/DL — SIGNIFICANT CHANGE UP (ref 6–8.3)
PROT UR-MCNC: NEGATIVE MG/DL — SIGNIFICANT CHANGE UP
RBC # BLD: 3.97 M/UL — SIGNIFICANT CHANGE UP (ref 3.8–5.2)
RBC # FLD: 14 % — SIGNIFICANT CHANGE UP (ref 10.3–14.5)
RBC CASTS # UR COMP ASSIST: 2 /HPF — SIGNIFICANT CHANGE UP (ref 0–4)
REVIEW: SIGNIFICANT CHANGE UP
SODIUM SERPL-SCNC: 133 MMOL/L — LOW (ref 135–145)
SP GR SPEC: <1.005 — LOW (ref 1–1.03)
SQUAMOUS # UR AUTO: 1 /HPF — SIGNIFICANT CHANGE UP (ref 0–5)
TROPONIN T, HIGH SENSITIVITY RESULT: 26 NG/L — SIGNIFICANT CHANGE UP (ref 0–51)
TSH SERPL-MCNC: 2.01 UIU/ML — SIGNIFICANT CHANGE UP (ref 0.27–4.2)
UROBILINOGEN FLD QL: 0.2 MG/DL — SIGNIFICANT CHANGE UP (ref 0.2–1)
WBC # BLD: 9.02 K/UL — SIGNIFICANT CHANGE UP (ref 3.8–10.5)
WBC # FLD AUTO: 9.02 K/UL — SIGNIFICANT CHANGE UP (ref 3.8–10.5)
WBC UR QL: 3 /HPF — SIGNIFICANT CHANGE UP (ref 0–5)

## 2024-10-21 PROCEDURE — 71045 X-RAY EXAM CHEST 1 VIEW: CPT | Mod: 26

## 2024-10-21 PROCEDURE — 99285 EMERGENCY DEPT VISIT HI MDM: CPT

## 2024-10-21 PROCEDURE — 99223 1ST HOSP IP/OBS HIGH 75: CPT

## 2024-10-21 RX ORDER — AMLODIPINE BESYLATE 10 MG
5 TABLET ORAL DAILY
Refills: 0 | Status: DISCONTINUED | OUTPATIENT
Start: 2024-10-21 | End: 2024-10-24

## 2024-10-21 RX ORDER — ROSUVASTATIN CALCIUM 10 MG
1 TABLET ORAL
Refills: 0 | DISCHARGE

## 2024-10-21 RX ORDER — SODIUM CHLORIDE 9 MG/ML
1 INJECTION, SOLUTION INTRAMUSCULAR; INTRAVENOUS; SUBCUTANEOUS
Refills: 0 | DISCHARGE

## 2024-10-21 RX ORDER — ALPRAZOLAM 0.25 MG
0.25 TABLET ORAL DAILY
Refills: 0 | Status: DISCONTINUED | OUTPATIENT
Start: 2024-10-21 | End: 2024-10-24

## 2024-10-21 RX ORDER — FAMOTIDINE 10 MG/ML
20 INJECTION INTRAVENOUS DAILY
Refills: 0 | Status: DISCONTINUED | OUTPATIENT
Start: 2024-10-21 | End: 2024-10-24

## 2024-10-21 RX ORDER — CYANOCOBALAMIN (VITAMIN B-12) 1000MCG/15
1 LIQUID (ML) ORAL
Refills: 0 | DISCHARGE

## 2024-10-21 RX ORDER — SODIUM CHLORIDE 9 MG/ML
1000 INJECTION, SOLUTION INTRAMUSCULAR; INTRAVENOUS; SUBCUTANEOUS ONCE
Refills: 0 | Status: COMPLETED | OUTPATIENT
Start: 2024-10-21 | End: 2024-10-21

## 2024-10-21 RX ORDER — MELATONIN 5 MG
3 TABLET ORAL AT BEDTIME
Refills: 0 | Status: DISCONTINUED | OUTPATIENT
Start: 2024-10-21 | End: 2024-10-24

## 2024-10-21 RX ORDER — SODIUM CHLORIDE 9 MG/ML
1 INJECTION, SOLUTION INTRAMUSCULAR; INTRAVENOUS; SUBCUTANEOUS DAILY
Refills: 0 | Status: DISCONTINUED | OUTPATIENT
Start: 2024-10-21 | End: 2024-10-24

## 2024-10-21 RX ORDER — CYANOCOBALAMIN (VITAMIN B-12) 1000MCG/15
1000 LIQUID (ML) ORAL DAILY
Refills: 0 | Status: DISCONTINUED | OUTPATIENT
Start: 2024-10-21 | End: 2024-10-24

## 2024-10-21 RX ORDER — ACETAMINOPHEN 500 MG
650 TABLET ORAL EVERY 6 HOURS
Refills: 0 | Status: DISCONTINUED | OUTPATIENT
Start: 2024-10-21 | End: 2024-10-24

## 2024-10-21 RX ORDER — CHOLECALCIFEROL (VITAMIN D3) 625 MCG
1000 CAPSULE ORAL DAILY
Refills: 0 | Status: DISCONTINUED | OUTPATIENT
Start: 2024-10-21 | End: 2024-10-24

## 2024-10-21 RX ORDER — LOSARTAN POTASSIUM 25 MG/1
50 TABLET ORAL DAILY
Refills: 0 | Status: DISCONTINUED | OUTPATIENT
Start: 2024-10-21 | End: 2024-10-24

## 2024-10-21 RX ORDER — METOPROLOL TARTRATE 50 MG
25 TABLET ORAL DAILY
Refills: 0 | Status: DISCONTINUED | OUTPATIENT
Start: 2024-10-21 | End: 2024-10-22

## 2024-10-21 RX ORDER — FAMOTIDINE 10 MG/ML
1 INJECTION INTRAVENOUS
Refills: 0 | DISCHARGE

## 2024-10-21 RX ORDER — ONDANSETRON HYDROCHLORIDE 2 MG/ML
4 INJECTION, SOLUTION INTRAMUSCULAR; INTRAVENOUS ONCE
Refills: 0 | Status: COMPLETED | OUTPATIENT
Start: 2024-10-21 | End: 2024-10-21

## 2024-10-21 RX ORDER — PANTOPRAZOLE SODIUM 40 MG/1
40 TABLET, DELAYED RELEASE ORAL
Refills: 0 | Status: DISCONTINUED | OUTPATIENT
Start: 2024-10-21 | End: 2024-10-24

## 2024-10-21 RX ADMIN — FAMOTIDINE 20 MILLIGRAM(S): 10 INJECTION INTRAVENOUS at 21:44

## 2024-10-21 RX ADMIN — Medication 25 MILLIGRAM(S): at 21:44

## 2024-10-21 RX ADMIN — ONDANSETRON HYDROCHLORIDE 4 MILLIGRAM(S): 2 INJECTION, SOLUTION INTRAMUSCULAR; INTRAVENOUS at 15:40

## 2024-10-21 RX ADMIN — Medication 0.25 MILLIGRAM(S): at 21:44

## 2024-10-21 RX ADMIN — SODIUM CHLORIDE 500 MILLILITER(S): 9 INJECTION, SOLUTION INTRAMUSCULAR; INTRAVENOUS; SUBCUTANEOUS at 14:47

## 2024-10-21 NOTE — H&P ADULT - ASSESSMENT
89 y/o female w/ PMHx PSVT s/p ablation, HTN, moderate AS, GERD, and IBS presenting for presyncope and diarrhea. Unable to ambulate safely in ED. Admitted for presyncope eval, PT eval.  89 y/o female w/ PMHx PSVT s/p ablation, HTN, PMR, moderate AS, GERD, and IBS presenting for multiple episodes of near-syncope. Unable to ambulate safely in ED. Admitted for near-syncope eval, PT eval.

## 2024-10-21 NOTE — ED ADULT NURSE NOTE - OBJECTIVE STATEMENT
89 yr old female to ED accomp by daughter with c/o diarrheax1 week. Refer PMD for feeling near syncopy x 3 this week. Pos chills Denies fever Denies chest pain or sob Pt able to smell salt and felt better. Denies blood in stool Denies abd pain C/o nausea. Denies vomit. Has H/O SVT Ablation recently. RSR. Made comfortable.

## 2024-10-21 NOTE — H&P ADULT - NSHPPHYSICALEXAM_GEN_ALL_CORE
Vital Signs Last 24 Hrs  T(C): 36.7 (21 Oct 2024 18:48), Max: 36.7 (21 Oct 2024 18:48)  T(F): 98.1 (21 Oct 2024 18:48), Max: 98.1 (21 Oct 2024 18:48)  HR: 81 (21 Oct 2024 18:48) (77 - 81)  BP: 140/52 (21 Oct 2024 18:48) (140/52 - 145/74)  BP(mean): 76 (21 Oct 2024 18:48) (76 - 76)  RR: 16 (21 Oct 2024 18:48) (16 - 18)  SpO2: 95% (21 Oct 2024 18:48) (95% - 99%)    Parameters below as of 21 Oct 2024 18:48  Patient On (Oxygen Delivery Method): room air        CONSTITUTIONAL: Well-groomed, in no apparent distress  EYES: No conjunctival or scleral injection, non-icteric;   NECK: Trachea midline without palpable neck mass  RESPIRATORY: Breathing comfortably; lungs CTA without wheeze/rhonchi/rales  CARDIOVASCULAR: +S1S2, RRR, no M/G/R; no lower extremity edema  GASTROINTESTINAL: No palpable masses or tenderness, +BS throughout, no rebound/guarding  SKIN: No rashes or ulcers noted  NEUROLOGIC: CN II-XII intact; sensation intact in LEs b/l to light touch  PSYCHIATRIC: A+O x 3; mood and affect appropriate; appropriate insight and judgment Vital Signs Last 24 Hrs  T(C): 36.7 (21 Oct 2024 18:48), Max: 36.7 (21 Oct 2024 18:48)  T(F): 98.1 (21 Oct 2024 18:48), Max: 98.1 (21 Oct 2024 18:48)  HR: 81 (21 Oct 2024 18:48) (77 - 81)  BP: 140/52 (21 Oct 2024 18:48) (140/52 - 145/74)  BP(mean): 76 (21 Oct 2024 18:48) (76 - 76)  RR: 16 (21 Oct 2024 18:48) (16 - 18)  SpO2: 95% (21 Oct 2024 18:48) (95% - 99%)    Parameters below as of 21 Oct 2024 18:48  Patient On (Oxygen Delivery Method): room air        CONSTITUTIONAL: Well-groomed, in no apparent distress  EYES: No conjunctival or scleral injection, non-icteric;   NECK: Trachea midline without palpable neck mass  RESPIRATORY: Breathing comfortably; lungs CTA without wheeze/rhonchi/rales  CARDIOVASCULAR: +S1S2, RRR, +systolic ejection murmur loudest at R 2nd ICS; no lower extremity edema  GASTROINTESTINAL: No palpable masses or tenderness, +BS throughout, no rebound/guarding  SKIN: No rashes or ulcers noted  NEUROLOGIC: Sensation intact in LEs b/l to light touch, 5/5 strength across all extremities  PSYCHIATRIC: A+O x 3; mood and affect appropriate; appropriate insight and judgment

## 2024-10-21 NOTE — ED PROVIDER NOTE - OBJECTIVE STATEMENT
89-year-old female past medical history SVT s/p ablation 5/14, GERD, HTN, moderate AS and IBS presents to ED for multiple presyncopal episodes today.  Notes prodromal symptoms of lightheadedness and faded vision.  Denies chest pain, SOB.  States that she is having multiple bouts of diarrhea x 1 week on Imodium.  Denies fever, chills, nausea, vomiting, chest pain, SOB, abdominal pain, urinary symptoms.

## 2024-10-21 NOTE — H&P ADULT - NSHPREVIEWOFSYSTEMS_GEN_ALL_CORE
Review of Systems:   CONSTITUTIONAL: No fever, weight loss  EYES: No eye pain, visual disturbances, or discharge  RESPIRATORY: No SOB. No cough, wheezing, chills or hemoptysis  CARDIOVASCULAR: No chest pain, palpitations, dizziness, or leg swelling  GASTROINTESTINAL: No abdominal or epigastric pain. No nausea, vomiting, or hematemesis; No diarrhea or constipation. No melena or hematochezia.  GENITOURINARY: No dysuria, frequency, hematuria, or incontinence  NEUROLOGICAL: No headaches, memory loss, loss of strength, numbness, or tremors  SKIN: No itching, burning, rashes, or lesions   MUSCULOSKELETAL: No joint pain or swelling; No muscle, back pain  PSYCHIATRIC: No depression, anxiety, mood swings, or difficulty sleeping  HEME/LYMPH: No easy bruising, or bleeding gums Review of Systems:   CONSTITUTIONAL: No fever, weight loss  EYES: No eye pain, visual disturbances, or discharge  RESPIRATORY: No SOB. No cough, wheezing, chills or hemoptysis  CARDIOVASCULAR: +near-loss of consciousness; No chest pain, palpitations, dizziness, or leg swelling  GASTROINTESTINAL: No abdominal or epigastric pain. No nausea, vomiting, or hematemesis; No diarrhea or constipation. No melena or hematochezia.  GENITOURINARY: No dysuria, frequency, hematuria, or incontinence  NEUROLOGICAL: No headaches, memory loss, loss of strength, numbness, or tremors  SKIN: No itching, burning, rashes, or lesions   MUSCULOSKELETAL: No joint pain or swelling; No muscle, back pain  PSYCHIATRIC: No depression, anxiety, mood swings, or difficulty sleeping  HEME/LYMPH: No easy bruising, or bleeding gums

## 2024-10-21 NOTE — ED PROVIDER NOTE - CADM POA PRESS ULCER
Spoke to patient regarding scheduled upcoming GI procedure.     Place of procedure: [facility]  Arrival Time: 0745  Procedure Time: 0900  Prep Type:  Miralax  
No

## 2024-10-21 NOTE — ED ADULT NURSE NOTE - CAS TRG GEN SKIN CONDITION
John Paul requested TSH and CMP faxed to [phone] stating pt was involved in a chemical spill at work, baseline labs needed.  Faxed
Warm

## 2024-10-21 NOTE — ED PROVIDER NOTE - PROGRESS NOTE DETAILS
Attending Teresa: originally planned for CDU however pt not comfortable walking independently. will require admission.

## 2024-10-21 NOTE — ED PROVIDER NOTE - IV ALTEPLASE ADMIN OUTSIDE HIDDEN
Patient has had protein levels.  The last one in Sept.  She does have low albumin.  This is usually either dietary or an absorption issue.  Can address then.   show

## 2024-10-21 NOTE — ED ADULT NURSE REASSESSMENT NOTE - NS ED NURSE REASSESS COMMENT FT1
Pt feeling like " I am going to pass out." Resp even and nonlab . C/o mouth feels dry NS infusing as ordered Denies chest pain GCS 15 responds approp to verbal and tactile stimuli. 02 sat 100% Daughter at bedside EKG repeated ON RSR.

## 2024-10-21 NOTE — H&P ADULT - PROBLEM SELECTOR PLAN 2
- Unable to ambulate safely in ED  - Fall precautions  - PT eval in AM - C/w metoprolol succinate 25mg daily  - S/p ablation 6/14/24

## 2024-10-21 NOTE — ED ADULT NURSE REASSESSMENT NOTE - NS ED NURSE REASSESS COMMENT FT1
report received from TREASURE Ross. pt A&Ox4, speech is clear, following commands, neurologically intact. pt denies cp, sob, difficulty breathing at this time. pt states she feels "faint" IVF hung at this time. side rails up. EKG ordered. orthostatics completed. plan of care discussed with pt, verbalizes understanding. PIV c/d/i.

## 2024-10-21 NOTE — H&P ADULT - HISTORY OF PRESENT ILLNESS
89 y/o female w/ PMHx PSVT s/p ablation, HTN, moderate AS, GERD, and IBS presenting for presyncope and diarrhea. Has had _ episodes today where she had lightheadedness associated with blurry vision. She never fully lost consciousness as her daughter used the smelling salts to prevent it. Of note, she has been having watery diarrhea for 1 week, has been taking Imodium w/ moderate relief. She has a history of vasovagal syncope associated w/ having BMs in the past. Came to the ED for further evaluation.    In the ED, she was afebrile and hemodynamically stable, saturating well on RA. CBC w/ normocytic anemia of Hb 10.7, CMP w/ mild hyponatremia of 133, other electrolytes wnl. ECG NSR w/ 1st-degree AV block w/ PACs. Received 1L IVNS and Zofran in the ED.  89 y/o female w/ PMHx PSVT s/p ablation, HTN, PMR, moderate AS, GERD, and IBS presenting for near-syncope. Has had 3 episodes today where she had lightheadedness associated with blurry vision. In the morning, she was eating some oatmeal, and she had her first episode where she felt her vision going blurry as well as her consciousness fading. She had some smelling salts next to her, so she was able to grab it and break the episode prior to actually losing consciousness. She was then moved to her recliner cough. Then in the afternoon, she had another episode of near-syncope while sitting in her recliner couch that her family saw. She was not doing any specific activity at the time. The episode passed, and her family was about to drive her to the hospital after checking her BP, but she had another episode shortly while getting her BP checked, so they decided to call EMS. Per the patient, she may have had another episode en route to the hospital, and she vaguely remember one of the EMS personnel stating something about seeing SVT on the monitor but she is unsure. She has a history of vasovagal syncope associated w/ having BMs in the past. For the past week, she was having multiple episodes of loose, watery stools daily, has been taking imodium. She thought maybe her near-syncope episodes were due to dehydration. Hasn't had diarrhea for the past 2 days. Came to the ED for further evaluation.    In the ED, she was afebrile and hemodynamically stable, saturating well on RA. CBC w/ normocytic anemia of Hb 10.7, CMP w/ mild hyponatremia of 133, other electrolytes wnl. ECG NSR w/ 1st-degree AV block w/ PACs. Received 1L IVNS and Zofran in the ED.

## 2024-10-21 NOTE — ED PROVIDER NOTE - PHYSICAL EXAMINATION
Gen: NAD, non-toxic appearing  Head: normal appearing  HEENT: normal conjunctiva, oral mucosa dry, normal eom, perrla   Lung: no respiratory distress, speaking in full sentences, CTA b/l     CV: regular rate and rhythm, no murmurs  Abd: soft, non distended, non tender   MSK: no visible deformities  Neuro: No focal deficits, AAOx3  Skin: Warm  Psych: normal affect

## 2024-10-21 NOTE — H&P ADULT - PROBLEM SELECTOR PLAN 3
- Has been having 1 week of diarrhea  - Monitor for episodes inpatient, check GI PCR if having episodes - Unable to ambulate safely in ED  - Fall precautions  - PT eval in AM

## 2024-10-21 NOTE — ED ADULT NURSE NOTE - NS ED NURSE LEVEL OF CONSCIOUSNESS AFFECT
"Patient ambulated to restroom. Patient states he felt very dizzy upon changing from laying to sitting position. Patient also stated he felt \"very nauseated and shaking noted to both arms\". Dr. Ackerman aware.  " Calm

## 2024-10-21 NOTE — H&P ADULT - NSICDXPASTMEDICALHX_GEN_ALL_CORE_FT
PAST MEDICAL HISTORY:  Gastroesophageal reflux disease, esophagitis presence not specified     History of polymyalgia rheumatica     Hypertension     IBS (irritable bowel syndrome)     Paroxysmal SVT (supraventricular tachycardia)

## 2024-10-21 NOTE — H&P ADULT - NSHPLABSRESULTS_GEN_ALL_CORE
10.7   9.02  )-----------( 277      ( 21 Oct 2024 14:32 )             33.5     10-21    133[L]  |  97  |  8   ----------------------------<  136[H]  4.1   |  26  |  0.65    Ca    9.1      21 Oct 2024 14:32  Phos  3.2     10-21  Mg     2.2     10-21    TPro  6.3  /  Alb  3.7  /  TBili  0.3  /  DBili  x   /  AST  17  /  ALT  10  /  AlkPhos  49  10-21          LIVER FUNCTIONS - ( 21 Oct 2024 14:32 )  Alb: 3.7 g/dL / Pro: 6.3 g/dL / ALK PHOS: 49 U/L / ALT: 10 U/L / AST: 17 U/L / GGT: x             Urinalysis Basic - ( 21 Oct 2024 14:34 )    Color: Yellow / Appearance: Clear / SG: <1.005 / pH: x  Gluc: x / Ketone: Negative mg/dL  / Bili: Negative / Urobili: 0.2 mg/dL   Blood: x / Protein: Negative mg/dL / Nitrite: Negative   Leuk Esterase: Small / RBC: 2 /HPF / WBC 3 /HPF   Sq Epi: x / Non Sq Epi: 1 /HPF / Bacteria: Negative /HPF

## 2024-10-21 NOTE — ED PROVIDER NOTE - CLINICAL SUMMARY MEDICAL DECISION MAKING FREE TEXT BOX
Afebrile hemodynamically stable female presents to ED for multiple bouts of presyncopal episode in the setting of diffuse of diarrhea.  On Imodium.  Soft nontender abdomen.  Normal EOM, PERRLA.  Normal conjunctiva.  Ordered basic labs, mag/Phos, TSH, troponin, CXR to rule out ACS versus electrolyte abnormality versus vasovagal versus infectious etiology.  Given fluids, Zofran, reassess.

## 2024-10-21 NOTE — H&P ADULT - PROBLEM SELECTOR PLAN 1
- ECG personally reviewed, NSR w/ 1st degree AV block with PACs  - Monitor on telemetry  - Patient does have history of vasovagal syncope  - TTE 6/12/24 - LVEF >75%, mild AS, normal RV cavity size and systolic function  - Will defer repeat TTE for now  - Check orthostatics - ECG personally reviewed, NSR w/ 1st degree AV block with PACs  - Patient does have history of vasovagal syncope, however, based on history, more worried about arrhythmogenic cause in the setting of previous PSVT and EMS possibly having seen SVT on the monitor en route to the hospital  - Monitor events on telemetry  - TTE 6/12/24 - LVEF >75%, mild AS, normal RV cavity size and systolic function  - Will defer repeat TTE for now  - Check orthostatics

## 2024-10-22 LAB
ALBUMIN SERPL ELPH-MCNC: 3.2 G/DL — LOW (ref 3.3–5)
ALP SERPL-CCNC: 42 U/L — SIGNIFICANT CHANGE UP (ref 40–120)
ALT FLD-CCNC: 9 U/L — LOW (ref 10–45)
ANION GAP SERPL CALC-SCNC: 7 MMOL/L — SIGNIFICANT CHANGE UP (ref 5–17)
AST SERPL-CCNC: 16 U/L — SIGNIFICANT CHANGE UP (ref 10–40)
BILIRUB SERPL-MCNC: 0.3 MG/DL — SIGNIFICANT CHANGE UP (ref 0.2–1.2)
BUN SERPL-MCNC: 6 MG/DL — LOW (ref 7–23)
CALCIUM SERPL-MCNC: 8.7 MG/DL — SIGNIFICANT CHANGE UP (ref 8.4–10.5)
CHLORIDE SERPL-SCNC: 99 MMOL/L — SIGNIFICANT CHANGE UP (ref 96–108)
CK SERPL-CCNC: 70 U/L — SIGNIFICANT CHANGE UP (ref 25–170)
CO2 SERPL-SCNC: 28 MMOL/L — SIGNIFICANT CHANGE UP (ref 22–31)
CREAT SERPL-MCNC: 0.7 MG/DL — SIGNIFICANT CHANGE UP (ref 0.5–1.3)
EGFR: 82 ML/MIN/1.73M2 — SIGNIFICANT CHANGE UP
GLUCOSE SERPL-MCNC: 91 MG/DL — SIGNIFICANT CHANGE UP (ref 70–99)
HCT VFR BLD CALC: 31.2 % — LOW (ref 34.5–45)
HGB BLD-MCNC: 9.9 G/DL — LOW (ref 11.5–15.5)
MCHC RBC-ENTMCNC: 27 PG — SIGNIFICANT CHANGE UP (ref 27–34)
MCHC RBC-ENTMCNC: 31.7 GM/DL — LOW (ref 32–36)
MCV RBC AUTO: 85.2 FL — SIGNIFICANT CHANGE UP (ref 80–100)
NRBC # BLD: 0 /100 WBCS — SIGNIFICANT CHANGE UP (ref 0–0)
PLATELET # BLD AUTO: 251 K/UL — SIGNIFICANT CHANGE UP (ref 150–400)
POTASSIUM SERPL-MCNC: 3.7 MMOL/L — SIGNIFICANT CHANGE UP (ref 3.5–5.3)
POTASSIUM SERPL-SCNC: 3.7 MMOL/L — SIGNIFICANT CHANGE UP (ref 3.5–5.3)
PROT SERPL-MCNC: 5.4 G/DL — LOW (ref 6–8.3)
RBC # BLD: 3.66 M/UL — LOW (ref 3.8–5.2)
RBC # FLD: 14.1 % — SIGNIFICANT CHANGE UP (ref 10.3–14.5)
SODIUM SERPL-SCNC: 134 MMOL/L — LOW (ref 135–145)
TROPONIN T, HIGH SENSITIVITY RESULT: 31 NG/L — SIGNIFICANT CHANGE UP (ref 0–51)
WBC # BLD: 6.24 K/UL — SIGNIFICANT CHANGE UP (ref 3.8–10.5)
WBC # FLD AUTO: 6.24 K/UL — SIGNIFICANT CHANGE UP (ref 3.8–10.5)

## 2024-10-22 PROCEDURE — 70496 CT ANGIOGRAPHY HEAD: CPT | Mod: 26

## 2024-10-22 PROCEDURE — 70498 CT ANGIOGRAPHY NECK: CPT | Mod: 26

## 2024-10-22 PROCEDURE — 70450 CT HEAD/BRAIN W/O DYE: CPT | Mod: 26,XU

## 2024-10-22 RX ORDER — METOPROLOL TARTRATE 50 MG
25 TABLET ORAL AT BEDTIME
Refills: 0 | Status: DISCONTINUED | OUTPATIENT
Start: 2024-10-22 | End: 2024-10-24

## 2024-10-22 RX ADMIN — Medication 25 MILLIGRAM(S): at 21:32

## 2024-10-22 RX ADMIN — Medication 5 MILLIGRAM(S): at 05:14

## 2024-10-22 RX ADMIN — SODIUM CHLORIDE 1 GRAM(S): 9 INJECTION, SOLUTION INTRAMUSCULAR; INTRAVENOUS; SUBCUTANEOUS at 11:48

## 2024-10-22 RX ADMIN — Medication 1000 MICROGRAM(S): at 11:26

## 2024-10-22 RX ADMIN — FAMOTIDINE 20 MILLIGRAM(S): 10 INJECTION INTRAVENOUS at 21:26

## 2024-10-22 RX ADMIN — Medication 1000 UNIT(S): at 11:25

## 2024-10-22 RX ADMIN — LOSARTAN POTASSIUM 50 MILLIGRAM(S): 25 TABLET ORAL at 05:13

## 2024-10-22 RX ADMIN — PANTOPRAZOLE SODIUM 40 MILLIGRAM(S): 40 TABLET, DELAYED RELEASE ORAL at 05:13

## 2024-10-22 RX ADMIN — Medication 3 MILLIGRAM(S): at 05:13

## 2024-10-22 RX ADMIN — Medication 0.25 MILLIGRAM(S): at 18:08

## 2024-10-22 NOTE — CONSULT NOTE ADULT - ASSESSMENT
This is a 89y year old Female with the below past medical history who presents with the chief complaint of 3-4 episode of pre-syncope in 1 day without LOC. has hx of ablation 6/24 for SVT and newly on amlodipine since then , states feels well since then wihtout "skipping" feeling of heart. no hx of seizures. hx of vasovagal syncope with BMs in past. past week has diarrhea/loose stools , hx of IBS. states does not think she is dehydrated, takes in good about of fluid daily. was given bolus IVFs when she arrived in ER. orthostatics done today were without significant drop with position change per patient. no fevers. no change in meds or focal deficits. no headache. no seizure-like activity. was seen in my group office for neuropathy with Dr Ceballos scheduled for EMG testing next month. no meds for neuropathy. crt 1.33, 2 daughters bedside witnessed episodes at home, no abnormal shaking or LOC observed. uses cane for ambulation outside of home.    pre-syncope episodes without LOC    recs:  -no features to suggest seizure episodes  -defer need for LP or EEG  -CVA/TIA not suspected    -check orthostatics  -telemetry monitor  -cardio eval for hx of svt s/p recent ablation newly on amlodipine  -primary team workup for metabolic /infectious issues, electrolyte issues  with diarrhea past week / HX IBS  -workup for diarrhea per primary team    PT/OT fall preventions  CT head, CT angio h/n ordered     suspect vasovagal / cardiac episodes vs dehydration/ ROBI,   received IVFs on arrival in ER  -avoid dehydration    supportive care and dispo plans per primary team  outpatient workup ongoing for peripheral neuropathy unrelated to these events    if ct head / CT angio h/n unremarkable for abnormal significant findings would have no further neurodiagnostics inpatient to recommend    no neuro objection to discharge when medically stable, if ct head/ct angio done and unremarkable  d/w 2 daughters bedside and patient    follow up neuro office as planned with Dr Ceballos, [phone]

## 2024-10-22 NOTE — CHART NOTE - NSCHARTNOTEFT_GEN_A_CORE
Pt reports she takes her Metoprolol 25mg at bedtime and wishes to have this at bedtime rather than morning. Reviewed outpatient medication. Switched metoprolol to take at bedtime

## 2024-10-22 NOTE — CONSULT NOTE ADULT - ASSESSMENT
Impression:  Several episodes of near syncope  History of vasovagal reaction however the usual precipitant is during a BM not present yesterday  No other obvious precipitants to yesterday's episodes if vagal.  Although she could have been dehydrated from diarrhea, lab results do not support that and episodes occurred while sitting on a chair or while lying down.    New or worsening anemia.  H/H was 11/36.7 at last office visit 9/3/24.      Known moderate left carotid stenosis.  R/O other cerebrovascular disease.    Aortic stenosis, not severe recently assessed in June.    R/O bradyarrhythmia or less likely tachyarrhythmia.    No evidence of cardiac ischemia based on normal Troponin.      Plan:   Observe on telemetry.    Neuro consult called.    CTA head and neck and CT brain.    Obtain f/u CBC, iron studies and ferritin.    Stool guaiac.

## 2024-10-22 NOTE — PHYSICAL THERAPY INITIAL EVALUATION ADULT - PERTINENT HX OF CURRENT PROBLEM, REHAB EVAL
Pt is a 89 year old female with PMH significant for PSVT s/p ablation, HTN, PMR, moderate AS, GERD, and IBS.  Pt presents s/p near-syncope. Has had 3 episodes on day of admission where she had lightheadedness associated with blurry vision. In the morning on day of admission, she was eating some oatmeal, and she had her first episode where she felt her vision going blurry as well as her consciousness fading. She had some smelling salts next to her, so she was able to grab it and break the episode prior to actually losing consciousness. She was then moved to her recliner. Then in the afternoon, she had another episode of near-syncope while sitting in her recliner couch that her family saw. Her family was about to drive her to the hospital after checking her BP, but she had another episode shortly while getting her BP checked, so they decided to call EMS. She has a history of vasovagal syncope associated w/ having BMs in the past. For the past week, she was having multiple episodes of loose, watery stools daily, has been taking imodium. She thought maybe her near-syncope episodes were due to dehydration. Hasn't had diarrhea for the past 2 days. In the ED, she was afebrile and hemodynamically stable, saturating well on RA. CBC w/ normocytic anemia of Hb 10.7, CMP w/ mild hyponatremia of 133, other electrolytes wnl. ECG NSR w/ 1st-degree AV block w/ PACs. Received 1L IVNS and Zofran in the ED.   CXR(10/21): Clear lungs 89 year old female with PMH significant for PSVT s/p ablation, HTN, PMR, moderate AS, GERD, and IBS, p/w s/p near-syncope. Has had 3 episodes on day of admission where she had lightheadedness associated with blurry vision. In the morning on day of admission, she was eating some oatmeal, and she had her first episode where she felt her vision going blurry as well as her consciousness fading. She had some smelling salts next to her, so she was able to grab it and break the episode prior to actually losing consciousness. She was then moved to her recliner. Then in the afternoon, she had another episode of near-syncope while sitting in her recliner couch that her family saw. Her family was about to drive her to the hospital after checking her BP, but she had another episode shortly while getting her BP checked, so they decided to call EMS. She has a history of vasovagal syncope associated w/ having BMs in the past. For the past week, she was having multiple episodes of loose, watery stools daily, has been taking imodium. She thought maybe her near-syncope episodes were due to dehydration. Hasn't had diarrhea for the past 2 days. In the ED, she was afebrile and hemodynamically stable, saturating well on RA. CBC w/ normocytic anemia of Hb 10.7, CMP w/ mild hyponatremia of 133, other electrolytes wnl. ECG NSR w/ 1st-degree AV block w/ PACs. Received 1L IVNS and Zofran in the ED.   CXR 10/21: Clear lungs. CT HEAD 10/22: No CT evidence of acute intracranial pathology. CTA NECK 10/22: Moderate stenosis in the proximal right carotid bulb extending into the proximal right internal carotid artery, measuring greater than 50% using NASCET criteria. Mild to moderate stenosis in the proximal left internal carotid artery, caused by vessel tortuosity with kinking of the vessel lumen. Moderate stenosis of the left vertebral artery origin. Minimal stenosis the right vertebral artery origin and right V1 segment. The findings are significantly changed from 04/14/2024. CTA HEAD 10/22: No major vessel occlusion or flow limiting stenosis about the [place]. Mild stenoses in the intradural vertebral arteries bilaterally, which do not appear to be flow limiting. A questionable 2.5-3 mm extradural aneurysm arising from the cavernous segment right internal carotid artery is unchanged from 04/14/2024. No intradural aneurysm is identified about the [place].

## 2024-10-22 NOTE — PHYSICAL THERAPY INITIAL EVALUATION ADULT - PLANNED THERAPY INTERVENTIONS, PT EVAL
stairs: GOAL: Pt will negotiate 1 flight of stairs with or without appropriate assistive device and handrail via reciprocal/step-to technique indep in 2 weeks./balance training/gait training/transfer training

## 2024-10-22 NOTE — ADVANCED PRACTICE NURSE CONSULT - RECOMMEDATIONS
· Recommendations 	  - Turning and repositioning efforts.   - Physical Therapy Consult for weakness. - pending 10/21/244  - Heel Offloading Booties when in bed   - Offloading cushion when OOB.   - Nutrition and wound healing.  - Podiatric consult for nail care. Need for podiatric care to manage nail length and prevent complications.  - Reinforce activity limits and safety measures with patient     Treatment plan discussed with patient and RN Jael. Impression  recent history of diarrhea   keya rectal incontinence assocated dermatitis   bilateral sacrum/buttock deep tissue injury , present on admission .   bilateral heel no discoloration skin intact.  elongated toenails       Recommendations   1. Bilateral sacrum/buttock deep tissue injury       keya rectal incontinence dermatitis  Topical therapy- sacral/bilateral buttocks- cleanse w/incontinent cleanser, pat dry & apply bina cream  twice daily & prn soiling . Monitor for changes .  2. Right and left heel  - Elevate heels; apply Complete [place] air fluidized boots; ensure that the soles of the feet are not resting on the foot board of the bed.  3  Incontinent management - incontinent cleanser, pads,  keya care  BID  4. Maintain on an alternating air with low air loss surface   5. Turn & reposition every 2 hr; Use positioning pillow to turn and reposition, soft pillow between bony prominences; continue measures to decrease friction/shear/pressure.  6. Nutrition optimization.  7.  Place waffle cushion when out of bed to chair .   8. elongated toenails - - podiatry to consult  follow their management   will not follow , please recall for new issues  plan of care reviewed with covering staff Jael Sotelo (RN)

## 2024-10-22 NOTE — ADVANCED PRACTICE NURSE CONSULT - ASSESSMENT
Patient encountered on [facility] on 10/22/24  When wound care RN arrived on unit, patient was lying in a semi-gibbs position on their back and their head and upper body elevated. Patient accompanied by self. Patient was alert, oriented, engaging in conversation, and gave consent to skin consult. Ms. Hoyt was able to turn and reposition without assistance. Patient had episodes of uncontrollable diarrhea last week but to date, has resolved. Patient is continent with urine.  The wound care RN was able to visualize skin changes characterized by non-blanchable redness, over B/L buttocks/sacral skin, measuring approximately 6.0 x 6.0 x 0.0cm. The discoloration is non-blanching and darkened, raising concern for a possible deep tissue injury (DTI), though the skin remains intact.  On the B/L Heels, the skin was intact, blanchable erythema, but cannot rule out component of DTI. Will recommend offloading boots and Sween 24 cream to moisturize.  On the B/L Feet, will recommend Podiatric consult for routine foot and nail care. Need for podiatric care to manage nail length and prevent complications.  Educated patient on skin condition, the plan for prevention of injury, and the importance of turning and repositioning. Reinforced activity limits and safety measures with patient. Patient was left in sitting upright position in bed with call bell within reach, and bed in lowest position.  arrived on unit, patient was found lying in a low air loss pressure redistribution support surface style bed. The patient  is unable to turn independently and staff assistance x 1was provided. Once turned,  able to view her skin.   Patient was alert, oriented, engaging in conversation, and gave consent to skin consult.  Patient stated "she  had episodes of uncontrollable diarrhea last week but to date, has resolved". Patient is continent with urine.  Betzy rectal area with erythema and indurated consistent with incontinence dermatitis.  bilateral sacrum/buttock non blanchable deep red discoloration , and  hyperpigmentation and hypopigmented contracted skin , consistent with a deep tissue  size approximately  6 cm x 6 cm x 0 cm, present  on admission  bilateral heels , no discoloration  the skin was intact, blanchable erythema, with elongated toenails.   patient  was educated  on the importance  for turning  and positioning  every 2 hours. The use of waffle cushion when out of bed  to chair,  and to shift her Weight every 2 hours while in chair. The importance a keeping her  skin clean and dry and  to offload feet/heels ,  and optimal nutrition arrived on unit, patient was found lying in a low air loss pressure redistribution support surface style bed. The patient  is unable to turn independently and staff assistance x 1was provided. Once turned,  able to view her skin.   Patient was alert, oriented, engaging in conversation, and gave consent to skin consult.  Patient stated "she  had episodes of uncontrollable diarrhea last week but to date, has resolved". Patient is continent with urine.  Betzy rectal area with erythema and indurated consistent with incontinence dermatitis.  bilateral sacrum/buttock non blanchable deep red discoloration , consistent with a deep tissue  size approximately  6 cm x 6 cm x 0 cm, present  on admission  bilateral heels , no discoloration  the skin was intact, blanchable erythema, with elongated toenails.   patient  was educated  on the importance  for turning  and positioning  every 2 hours. The use of waffle cushion when out of bed  to chair,  and to shift her Weight every 2 hours while in chair. The importance a keeping her  skin clean and dry and  to offload feet/heels ,  and optimal nutrition

## 2024-10-22 NOTE — CONSULT NOTE ADULT - SUBJECTIVE AND OBJECTIVE BOX
Patient seen and evaluated @ bedside  Chief Complaint: 3 near syncopal episodes    HPI:  89 y/o female w/ PMHx PSVT s/p ablation, HTN, PMR, moderate AS mild to moderate, GERD, and IBS presenting for near-syncope. Has had 3 episodes today where she had lightheadedness associated with blurry vision. In the morning, she was eating some oatmeal, and she had her first episode where she felt her vision going blurry as well as her consciousness fading. She had some smelling salts next to her, so she was able to grab it and break the episode prior to actually losing consciousness. She was then moved to her recliner cough. Then in the afternoon, she had another episode of near-syncope while sitting in her recliner couch that her family saw. She was not doing any specific activity at the time. The episode passed, and her family was about to drive her to the hospital after checking her BP, but she had another episode shortly while getting her BP checked, so they decided to call EMS. Per the patient, she may have had another episode en route to the hospital, and she vaguely remember one of the EMS personnel stating something about seeing SVT on the monitor but she is unsure.     Patient has not had symptoms of SVT since her ablation but only occasional sensation of ectopic beats.  With the above episodes she may have felt some ectopic beats.  There may have been slight nausea but no diaphoresis as has generally present during prior episodes assocated with BMs    She has a history of vasovagal syncope associated w/ having BMs in the past. For the past week, she was having multiple episodes of loose, watery stools daily, has been taking imodium.  Hasn't had diarrhea for the 2 days prior to yesterday's episodes and although not eating as much was making an effort to maintain fluid intake.  In the ER BUN not elevated.  There were no orthostatic changes lying to sitting however this was obtained after she received 1 L saline.. Came to the ED for further evaluation.    Patient does have history of vestibulopathy however those sympotms were not percieved yesterday.    In the ED, she was afebrile and hemodynamically stable, saturating well on RA. CBC w/ normocytic anemia of Hb 10.7, CMP w/ mild hyponatremia of 133, other electrolytes wnl. ECG NSR w/ 1st-degree AV block w/ PACs. Received 1L IVNS and Zofran in the ED.       PMH:     Hypertension  Asthma since childhood  Gastroesophageal reflux disease, ? evidence of prior Rodriguez's esophagus  Hyponatremia  Diverticulitis x 3  Temporal arteritis/ PMR now quiescent  SVT S/P ablatioin 2024  Pre-diabetes  Pernicious anemia  BL carpal tunnel syndrome  Gallstones symptomatic on one occasion  Bilateral carotid stenosis - moderate right, mild left  Aortic stenosis, moderate  IgG kappa paraproteinemia  Spinal stenosis  Basal cell carcinoma eyelid, Squamous cell 2020  Mobitz I block when on higher doses of metoprolol and verapamil  History of cataract extraction, unspecified laterality  Vestibulopathy    PSH:     T & A as child  History of cataract extraction, BL 2013  L lower lip Mohs for basal cell 2013  Blepharoplasty OD      Medications:   acetaminophen     Tablet .. 650 milliGRAM(s) Oral every 6 hours PRN  ALPRAZolam 0.25 milliGRAM(s) Oral daily PRN  amLODIPine   Tablet 5 milliGRAM(s) Oral daily  cholecalciferol 1000 Unit(s) Oral daily  cyanocobalamin 1000 MICROGram(s) Oral daily  famotidine    Tablet 20 milliGRAM(s) Oral daily  IBgard with peppermint oil 1 Capsule(s) 1 Capsule(s) Oral <User Schedule>  losartan 50 milliGRAM(s) Oral daily  melatonin 3 milliGRAM(s) Oral at bedtime PRN  metoprolol succinate ER 25 milliGRAM(s) Oral daily  pantoprazole    Tablet 40 milliGRAM(s) Oral before breakfast  predniSONE   Tablet 3 milliGRAM(s) Oral daily  sodium chloride 1 Gram(s) Oral daily    Allergies:  adhesives (Other)  latex (Rash)  penicillins (Hives)    FAMILY HISTORY:    Family history of acute myocardial infarction father age 56  HTN 2 sisters  1 sister CHF  1 sister PVD  1 sister bladder ca  2 brothers bladder ca  1 brother PVD      Social History:  Smoking: None      REVIEW OF SYSTEMS:  See HPI  RESPIRATORY: No cough, wheezing, hemoptysis; No shortness of breath  CARDIOVASCULAR: No chest pain, dypsnea, edema, claudication  All other review of systems is negative unless indicated above    Physical Exam:  T(C): 36.8 (10-22-24 @ 11:08), Max: 36.8 (10-22-24 @ 00:42)  HR: 55 (10-22-24 @ 11:08) (55 - 93)  BP: 133/63 (10-22-24 @ 11:08) (110/61 - 147/66)  RR: 18 (10-22-24 @ 11:08) (16 - 18)  SpO2: 96% (10-22-24 @ 11:08) (95% - 99%)  Wt(kg): --    10-21 @ 07:01  -  10-22 @ 07:00  --------------------------------------------------------  IN: 240 mL / OUT: 0 mL / NET: 240 mL      Daily Height in cm: 165.1 (21 Oct 2024 13:11)    Daily     Appearance:  Normal, NAD  Eyes:   EOMI  HEENT: Normal oral mucosa NC/AT  Neck:  No JVD or HJR  Respiratory: Clear to auscultation bilaterally  Cardiovascular: S1 obscured by 1/6 GREGORIO base and LSB.  S2 single.  No rubs or gallops  Abdomen:   BS normal, Soft,  Non-tender without organomegaly or masses  Extremities: Without edema    Labs:                        9.9    6.24  )-----------( 251      ( 22 Oct 2024 07:18 )             31.2     10-22    134[L]  |  99  |  6[L]  ----------------------------<  91  3.7   |  28  |  0.70    Ca    8.7      22 Oct 2024 07:17  Phos  3.2     10-21  Mg     2.2     10-21    TPro  5.4[L]  /  Alb  3.2[L]  /  TBili  0.3  /  DBili  x   /  AST  16  /  ALT  9[L]  /  AlkPhos  42  10-22      Thyroid Stimulating Hormone, Serum: 2.01 uIU/mL (10-21 @ 14:54)        ECG:  NSR  1st degree AV block. NS ST abnormality.  o acute changes    TRANSTHORACIC ECHOCARDIOGRAM REPORT  ________________________________________________________________________________                                      _______       Pt. Name:       GEORGE GUTIERREZ Study Date:    6/12/2024  MRN:            [id number]          YOB: 1932  Accession #:    [id number]         Age:           89 years  Account#:       [id number]      Gender:        F  Visit ID#  Heart Rate:                       Height:        60.00 in (152.40 cm)  Rhythm:                 Weight:        128.00 lb (58.06 kg)  Blood Pressure: 123/70 mmHg       BSA/BMI:       1.54 m² / 25.00 kg/m²  ________________________________________________________________________________________  Referring Physician:    [id number] M Goldfarb  Interpreting Physician: Fide Mcmillan  Primary Sonographer:    Faith Garay    CPT:               ECHO TTE WO CON COMP W DOPP - 06576.[id number]  Indication(s):     Abnormal electrocardiogram ECG/EKG - R94.31  Procedure:         Transthoracic echocardiogram with 2-D, M-mode and complete                     spectral and color flow Doppler.  Ordering Location: [facility]  Admission Status:  Inpatient  Study Information: Image quality for this study is adequate.    _______________________________________________________________________________________     CONCLUSIONS:      1. Left ventricular systolic function is hyperdynamic with an ejection fraction visually estimated at >75 %.   2. Normal right ventricular cavity size and normal systolic function.   3. The left atrium is mildly dilated.   4. Mild aortic stenosis.    ________________________________________________________________________________________  FINDINGS:     Left Ventricle:  The left ventricular cavity is normal in size. Left ventricular wall thickness is normal. Left ventricular systolic function is hyperdynamic with an ejection fraction visually estimated at >75%. There are no regional wall motion abnormalities seen. The left ventricular diastolic function is indeterminate.     Right Ventricle:  The right ventricular cavity is normal in size and normal systolic function. Tricuspid annular plane systolic excursion (TAPSE) is 1.8 cm (normal >=1.7 cm).     Left Atrium:  The left atrium is mildly dilated with an indexed volume of 34.97 ml/m².     Right Atrium:  The right atrium is normal in size.     Aortic Valve:  The aortic valve appears trileaflet with reduced systolic excursion. There is calcification of the aortic valve leaflets. There is mild aortic stenosis. The peak transaortic velocity is 2.30 m/s and mean transaortic gradient is 12.0 mmHg with an LVOT/aortic valve VTI ratio of 0.51. There is trace aortic regurgitation.     Mitral Valve:  There is mitral valve thickening of the anterior and posteriorleaflets. There is moderate calcification of the mitral valve annulus. There is no mitral valve stenosis. There is mild mitral regurgitation.     Tricuspid Valve:  Structurally normal tricuspid valve with normal leaflet excursion. There is no evidence of tricuspid stenosis. There is trace tricuspid regurgitation. There is insufficient tricuspid regurgitation detected to calculate pulmonary artery systolic pressure.     Pulmonic Valve:  The pulmonic valve was not well visualized. There is no pulmonic valve stenosis. There is trace pulmonic regurgitation.     Aorta:  The aortic root appears normal in size.     Pericardium:  There is a trace pericardial effusion.     Systemic Veins:  The inferior vena cava is normal in size measuring 1.80 cm in diameter, (normal <2.1cm) with normal inspiratory collapse (normal >50%) consistent with normal right atrial pressure (~3, range 0-5mmHg).  ____________________________________________________________________  QUANTITATIVE DATA:  Left Ventricle Measurements: (Indexed to BSA)     IVSd (2D):   1.0 cm  LVPWd (2D):  0.7 cm  LVIDd (2D):  3.9 cm  LVIDs (2D):  2.0 cm  LV Mass:     97 g   62.8 g/m²  Visualized LV EF%: >75%     MV E Vmax:    1.14 m/s  e' lateral:   12.90 cm/s  e' medial:    6.00 cm/s  E/e'lateral: 8.84  E/e' medial:  19.00  E/e' Average: 12.06    Aorta Measurements: (Normal range) (Indexed to BSA)     Sinuses of Valsalva: 3.19 cm (2.7 - 3.3 cm)  Ao Asc prox:         3.40 cm       Left Atrium Measurements: (Indexed to BSA)  LA Diam 2D:       2.99 cm  LA Vol s, MOD A4C: 48.60 ml.  LA Vol s, MOD A2C: 50.80 ml.  LA Vol s, MOD BP:  54.00 ml  34.97 ml/m²    Right Ventricle Measurements:     TAPSE: 1.8 cm       LVOT / RVOT/ Qp/Qs Data: (Indexed to BSA)  LVOT Diameter:  2.00 cm  LVOT Area:      3.14 cm²  LVOT Vmax:      1.15 m/s  LVOT Vmn:       0.816 m/s  LVOT VTI:       30.90 cm  LVOT peak grad: 5 mmHg  LVOT mean grad: 2.5 mmHg  LVOT SV:        97.1 ml   62.87 ml/m²    Aortic Valve Measurements:  AV Vmax:                2.3 m/s  AVPeak Gradient:       21.2 mmHg  AV Mean Gradient:       12.0 mmHg  AV VTI:                 60.3 cm  AV VTI Ratio:           0.51  AoV EOA, Contin:        1.61 cm²  AoV EOA, Contin i:      1.04 cm²/m²  AoV Dimensionless Index 0.51    Mitral Valve Measurements:     MV E Vmax: 1.1 m/s       Tricuspid Valve Measurements:     RA Pressure: 3 mmHg    ________________________________________________________________________________________  Electronically signed on 6/12/2024 at 10:18:25 AM by Fide Mcmillan         *** Final ***    
Admitting Diagnosis:  Syncope and collapse [R55]  SYNCOPE AND COLLAPSE        HPI:  This is a 89y year old Female with the below past medical history who presents with the chief complaint of 3-4 episode of pre-syncope in 1 day without LOC. has hx of ablation 6/24 for SVT and newly on amlodipine since then , states feels well since then wihtout "skipping" feeling of heart. no hx of seizures. hx of vasovagal syncope with BMs in past. past week has diarrhea/loose stools , hx of IBS. states does not think she is dehydrated, takes in good about of fluid daily. was given bolus IVFs when she arrived in ER. orthostatics done today were without significant drop with position change per patient. no fevers. no change in meds or focal deficits. no headache. no seizure-like activity. was seen in my group office for neuropathy with Dr Ceballos scheduled for EMG testing next month. no meds for neuropathy. crt 1.33, 2 daughters bedside witnessed episodes at home, no abnormal shaking or LOC observed. uses cane for ambulation outside of home.    ****CHART HPI:  HPI:  89 y/o female w/ PMHx PSVT s/p ablation, HTN, PMR, moderate AS, GERD, and IBS presenting for near-syncope. Has had 3 episodes today where she had lightheadedness associated with blurry vision. In the morning, she was eating some oatmeal, and she had her first episode where she felt her vision going blurry as well as her consciousness fading. She had some smelling salts next to her, so she was able to grab it and break the episode prior to actually losing consciousness. She was then moved to her recliner cough. Then in the afternoon, she had another episode of near-syncope while sitting in her recliner couch that her family saw. She was not doing any specific activity at the time. The episode passed, and her family was about to drive her to the hospital after checking her BP, but she had another episode shortly while getting her BP checked, so they decided to call EMS. Per the patient, she may have had another episode en route to the hospital, and she vaguely remember one of the EMS personnel stating something about seeing SVT on the monitor but she is unsure. She has a history of vasovagal syncope associated w/ having BMs in the past. For the past week, she was having multiple episodes of loose, watery stools daily, has been taking imodium. She thought maybe her near-syncope episodes were due to dehydration. Hasn't had diarrhea for the past 2 days. Came to the ED for further evaluation.    In the ED, she was afebrile and hemodynamically stable, saturating well on RA. CBC w/ normocytic anemia of Hb 10.7, CMP w/ mild hyponatremia of 133, other electrolytes wnl. ECG NSR w/ 1st-degree AV block w/ PACs. Received 1L IVNS and Zofran in the ED.  (21 Oct 2024 19:33)  ******    Past Medical History:  Hypertension [I10]    Gastroesophageal reflux disease, esophagitis presence not specified [K21.9]    Hypertension [I10]    History of polymyalgia rheumatica [Z87.39]    Paroxysmal SVT (supraventricular tachycardia) [I47.10]    IBS (irritable bowel syndrome) [K58.9]        Past Surgical History:  History of cataract extraction, unspecified laterality [Z98.49]        Social History:  No toxic habits    Family History:  FAMILY HISTORY:  Family history of acute myocardial infarction        Allergies:  adhesives (Other)  latex (Rash)  penicillins (Hives)      ROS:  Constitutional: Patient offers no complaints of fevers or significant weight loss  Ears, Nose, Mouth and Throat: The patient presents with no abnormalities of the head, ears, eyes, nose or throat  Skin: Patient offers no concerns of new rashes or lesions  Respiratory: The patient presents with no abnormalities of the respiratory tract  Cardiovascular: The patient presents with no cardiac abnormalities  Gastrointestinal: The patient presents with no abnormalities of the GI system  Genitourinary: The patient presents with no dysuria, hematuria or frequent urination  Neurological: See HPI  Endocrine: Patient offers no complaints of excessive thirst, urination, or heat/cold intolerance    Advanced care planning reviewed and noted in the chart.    Medications:  acetaminophen     Tablet .. 650 milliGRAM(s) Oral every 6 hours PRN  ALPRAZolam 0.25 milliGRAM(s) Oral daily PRN  amLODIPine   Tablet 5 milliGRAM(s) Oral daily  cholecalciferol 1000 Unit(s) Oral daily  cyanocobalamin 1000 MICROGram(s) Oral daily  famotidine    Tablet 20 milliGRAM(s) Oral daily  IBgard with peppermint oil 1 Capsule(s) 1 Capsule(s) Oral <User Schedule>  losartan 50 milliGRAM(s) Oral daily  melatonin 3 milliGRAM(s) Oral at bedtime PRN  metoprolol succinate ER 25 milliGRAM(s) Oral daily  pantoprazole    Tablet 40 milliGRAM(s) Oral before breakfast  predniSONE   Tablet 3 milliGRAM(s) Oral daily  sodium chloride 1 Gram(s) Oral daily      Labs:  CBC Full  -  ( 22 Oct 2024 07:18 )  WBC Count : 6.24 K/uL  RBC Count : 3.66 M/uL  Hemoglobin : 9.9 g/dL  Hematocrit : 31.2 %  Platelet Count - Automated : 251 K/uL  Mean Cell Volume : 85.2 fl  Mean Cell Hemoglobin : 27.0 pg  Mean Cell Hemoglobin Concentration : 31.7 gm/dL  Auto Neutrophil # : x  Auto Lymphocyte # : x  Auto Monocyte # : x  Auto Eosinophil # : x  Auto Basophil # : x  Auto Neutrophil % : x  Auto Lymphocyte % : x  Auto Monocyte % : x  Auto Eosinophil % : x  Auto Basophil % : x    10-22    134[L]  |  99  |  6[L]  ----------------------------<  91  3.7   |  28  |  0.70    Ca    8.7      22 Oct 2024 07:17  Phos  3.2     10-21  Mg     2.2     10-21    TPro  5.4[L]  /  Alb  3.2[L]  /  TBili  0.3  /  DBili  x   /  AST  16  /  ALT  9[L]  /  AlkPhos  42  10-22    CAPILLARY BLOOD GLUCOSE        LIVER FUNCTIONS - ( 22 Oct 2024 07:17 )  Alb: 3.2 g/dL / Pro: 5.4 g/dL / ALK PHOS: 42 U/L / ALT: 9 U/L / AST: 16 U/L / GGT: x             Urinalysis Basic - ( 22 Oct 2024 07:17 )    Color: x / Appearance: x / SG: x / pH: x  Gluc: 91 mg/dL / Ketone: x  / Bili: x / Urobili: x   Blood: x / Protein: x / Nitrite: x   Leuk Esterase: x / RBC: x / WBC x   Sq Epi: x / Non Sq Epi: x / Bacteria: x      Female    Vitals:  Vital Signs Last 24 Hrs  T(C): 36.8 (22 Oct 2024 11:08), Max: 36.8 (22 Oct 2024 00:42)  T(F): 98.3 (22 Oct 2024 11:08), Max: 98.3 (22 Oct 2024 00:42)  HR: 55 (22 Oct 2024 11:08) (55 - 93)  BP: 133/63 (22 Oct 2024 11:08) (110/61 - 147/66)  BP(mean): 76 (21 Oct 2024 18:48) (76 - 76)  RR: 18 (22 Oct 2024 11:08) (16 - 18)  SpO2: 96% (22 Oct 2024 11:08) (95% - 99%)    Parameters below as of 22 Oct 2024 11:08  Patient On (Oxygen Delivery Method): room air        NEUROLOGICAL EXAM:    Mental status: Awake, alert, and in no apparent distress. Oriented to person, place and time. Language function is normal. Recent memory, digit span and concentration were normal. in good spirits.     Cranial Nerves: Pupils were equal, round, reactive to light. Extraocular movements were intact. Visual field were full. Fundoscopic exam was deferred. Facial sensation was intact to light touch. There was no facial asymmetry. The palate was upgoing symmetrically and tongue was midline. Hearing acuity was decreased , hearing aides being charged. Shoulder shrug was full bilaterally    Motor exam: Bulk and tone were normal. agx 4 independently. Fine finger movements were symmetric and normal. There was no pronator drift    Reflexes: deferred DTRs. Toes were downgoing bilaterally.     Sensation: Intact to light touch, grossly b/l ue and le    Coordination: Finger-nose-finger and heel-to-shin was without dysmetria.     Gait: defered sitting in bedside chair

## 2024-10-22 NOTE — PATIENT PROFILE ADULT - FALL HARM RISK - HARM RISK INTERVENTIONS

## 2024-10-22 NOTE — PHYSICAL THERAPY INITIAL EVALUATION ADULT - ADDITIONAL COMMENTS
Pt lives in a private house, 2 plus 4 steps to enter with 1 handrail; 1 flight of stairs to the bedroom with bilat handrails. Pt's son at bedside states that pt has day bed set up in the main floor where a bathroom is available as well so pt can stay on main floor if needed. Pt does not use assistive device for household ambulation; uses straight cane for outdoors, and 3-wheeled walker for long distance mobility. Pt has and uses a Life Alert and drives herself to outpatient PT twice a week where they work on her balance per pt

## 2024-10-23 LAB
ADD ON TEST-SPECIMEN IN LAB: SIGNIFICANT CHANGE UP
ANION GAP SERPL CALC-SCNC: 10 MMOL/L — SIGNIFICANT CHANGE UP (ref 5–17)
BUN SERPL-MCNC: 8 MG/DL — SIGNIFICANT CHANGE UP (ref 7–23)
CALCIUM SERPL-MCNC: 8.9 MG/DL — SIGNIFICANT CHANGE UP (ref 8.4–10.5)
CHLORIDE SERPL-SCNC: 98 MMOL/L — SIGNIFICANT CHANGE UP (ref 96–108)
CO2 SERPL-SCNC: 24 MMOL/L — SIGNIFICANT CHANGE UP (ref 22–31)
CREAT SERPL-MCNC: 0.72 MG/DL — SIGNIFICANT CHANGE UP (ref 0.5–1.3)
EGFR: 79 ML/MIN/1.73M2 — SIGNIFICANT CHANGE UP
FERRITIN SERPL-MCNC: 19 NG/ML — SIGNIFICANT CHANGE UP (ref 13–330)
FOLATE SERPL-MCNC: 12.4 NG/ML — SIGNIFICANT CHANGE UP
GLUCOSE SERPL-MCNC: 74 MG/DL — SIGNIFICANT CHANGE UP (ref 70–99)
HCT VFR BLD CALC: 31.1 % — LOW (ref 34.5–45)
HGB BLD-MCNC: 10 G/DL — LOW (ref 11.5–15.5)
IRON SATN MFR SERPL: 11 % — LOW (ref 14–50)
IRON SATN MFR SERPL: 33 UG/DL — SIGNIFICANT CHANGE UP (ref 30–160)
MCHC RBC-ENTMCNC: 27.2 PG — SIGNIFICANT CHANGE UP (ref 27–34)
MCHC RBC-ENTMCNC: 32.2 GM/DL — SIGNIFICANT CHANGE UP (ref 32–36)
MCV RBC AUTO: 84.7 FL — SIGNIFICANT CHANGE UP (ref 80–100)
NRBC # BLD: 0 /100 WBCS — SIGNIFICANT CHANGE UP (ref 0–0)
PLATELET # BLD AUTO: 250 K/UL — SIGNIFICANT CHANGE UP (ref 150–400)
POTASSIUM SERPL-MCNC: 4.2 MMOL/L — SIGNIFICANT CHANGE UP (ref 3.5–5.3)
POTASSIUM SERPL-SCNC: 4.2 MMOL/L — SIGNIFICANT CHANGE UP (ref 3.5–5.3)
RBC # BLD: 3.67 M/UL — LOW (ref 3.8–5.2)
RBC # FLD: 14.1 % — SIGNIFICANT CHANGE UP (ref 10.3–14.5)
SODIUM SERPL-SCNC: 132 MMOL/L — LOW (ref 135–145)
TIBC SERPL-MCNC: 314 UG/DL — SIGNIFICANT CHANGE UP (ref 220–430)
UIBC SERPL-MCNC: 281 UG/DL — SIGNIFICANT CHANGE UP (ref 110–370)
VIT B12 SERPL-MCNC: 1601 PG/ML — HIGH (ref 232–1245)
WBC # BLD: 7.66 K/UL — SIGNIFICANT CHANGE UP (ref 3.8–10.5)
WBC # FLD AUTO: 7.66 K/UL — SIGNIFICANT CHANGE UP (ref 3.8–10.5)

## 2024-10-23 RX ORDER — ASPIRIN/MAG CARB/ALUMINUM AMIN 325 MG
81 TABLET ORAL DAILY
Refills: 0 | Status: DISCONTINUED | OUTPATIENT
Start: 2024-10-23 | End: 2024-10-24

## 2024-10-23 RX ORDER — ROSUVASTATIN CALCIUM 10 MG
40 TABLET ORAL AT BEDTIME
Refills: 0 | Status: DISCONTINUED | OUTPATIENT
Start: 2024-10-23 | End: 2024-10-24

## 2024-10-23 RX ADMIN — Medication 40 MILLIGRAM(S): at 22:14

## 2024-10-23 RX ADMIN — Medication 1000 UNIT(S): at 11:25

## 2024-10-23 RX ADMIN — Medication 25 MILLIGRAM(S): at 22:14

## 2024-10-23 RX ADMIN — Medication 0.25 MILLIGRAM(S): at 14:25

## 2024-10-23 RX ADMIN — Medication 81 MILLIGRAM(S): at 11:27

## 2024-10-23 RX ADMIN — LOSARTAN POTASSIUM 50 MILLIGRAM(S): 25 TABLET ORAL at 05:18

## 2024-10-23 RX ADMIN — Medication 3 MILLIGRAM(S): at 05:18

## 2024-10-23 RX ADMIN — PANTOPRAZOLE SODIUM 40 MILLIGRAM(S): 40 TABLET, DELAYED RELEASE ORAL at 05:18

## 2024-10-23 RX ADMIN — Medication 5 MILLIGRAM(S): at 05:18

## 2024-10-23 RX ADMIN — Medication 1000 MICROGRAM(S): at 11:26

## 2024-10-23 RX ADMIN — FAMOTIDINE 20 MILLIGRAM(S): 10 INJECTION INTRAVENOUS at 22:14

## 2024-10-23 RX ADMIN — SODIUM CHLORIDE 1 GRAM(S): 9 INJECTION, SOLUTION INTRAMUSCULAR; INTRAVENOUS; SUBCUTANEOUS at 11:26

## 2024-10-23 NOTE — DISCHARGE NOTE PROVIDER - HOSPITAL COURSE
HPI:  89 y/o female w/ PMHx PSVT s/p ablation, HTN, PMR, moderate AS, GERD, and IBS presenting for near-syncope. Has had 3 episodes today where she had lightheadedness associated with blurry vision. In the morning, she was eating some oatmeal, and she had her first episode where she felt her vision going blurry as well as her consciousness fading. She had some smelling salts next to her, so she was able to grab it and break the episode prior to actually losing consciousness. She was then moved to her recliner cough. Then in the afternoon, she had another episode of near-syncope while sitting in her recliner couch that her family saw. She was not doing any specific activity at the time. The episode passed, and her family was about to drive her to the hospital after checking her BP, but she had another episode shortly while getting her BP checked, so they decided to call EMS. Per the patient, she may have had another episode en route to the hospital, and she vaguely remember one of the EMS personnel stating something about seeing SVT on the monitor but she is unsure. She has a history of vasovagal syncope associated w/ having BMs in the past. For the past week, she was having multiple episodes of loose, watery stools daily, has been taking imodium. She thought maybe her near-syncope episodes were due to dehydration. Hasn't had diarrhea for the past 2 days. Came to the ED for further evaluation.    In the ED, she was afebrile and hemodynamically stable, saturating well on RA. CBC w/ normocytic anemia of Hb 10.7, CMP w/ mild hyponatremia of 133, other electrolytes wnl. ECG NSR w/ 1st-degree AV block w/ PACs. Received 1L IVNS and Zofran in the ED.  (21 Oct 2024 19:33)    Hospital Course:      Important Medication Changes and Reason:    Active or Pending Issues Requiring Follow-up:    Advanced Directives:   [ ] Full code  [ ] DNR  [ ] Hospice    Discharge Diagnoses:         89 y/o female w/ PMHx PSVT s/p ablation, HTN, PMR, moderate AS, GERD, and IBS presenting for near-syncope. Has had 3 episodes today where she had lightheadedness associated with blurry vision. In the morning, she was eating some oatmeal, and she had her first episode where she felt her vision going blurry as well as her consciousness fading. She had some smelling salts next to her, so she was able to grab it and break the episode prior to actually losing consciousness. She was then moved to her recliner cough. Then in the afternoon, she had another episode of near-syncope while sitting in her recliner couch that her family saw. She was not doing any specific activity at the time. The episode passed, and her family was about to drive her to the hospital after checking her BP, but she had another episode shortly while getting her BP checked, so they decided to call EMS. Per the patient, she may have had another episode en route to the hospital, and she vaguely remember one of the EMS personnel stating something about seeing SVT on the monitor but she is unsure. She has a history of vasovagal syncope associated w/ having BMs in the past. For the past week, she was having multiple episodes of loose, watery stools daily, has been taking imodium. She thought maybe her near-syncope episodes were due to dehydration. Hasn't had diarrhea for the past 2 days. Came to the ED for further evaluation. In the ED, she was afebrile and hemodynamically stable, saturating well on RA. CBC w/ normocytic anemia of Hb 10.7, CMP w/ mild hyponatremia of 133, other electrolytes wnl. ECG NSR w/ 1st-degree AV block w/ PACs. Received 1L IVNS and Zofran in the ED.      Patient was admitted for near-syncope. Neurology consulted and cardiology following. CTA H/N showed moderate stenosis in the proximal right internal carotid artery and mild to moderate stenosis in the proximal left internal carotid artery, but otherwise unremarkable. Patient found with mild orthostasis. Patient given IV fluids. EKG unremarkable. TTE can be done outpatient as per cardiology. As per neurology, not a candidate for intervention for stenosis.     On 10/24/24 this case was reviewed with Dr. Figueroa, the patient is medically stable and optimized for discharge. All medications were reviewed and prescriptions were sent to mutually agreed upon pharmacy.

## 2024-10-23 NOTE — DISCHARGE NOTE PROVIDER - NSDCCPCAREPLAN_GEN_ALL_CORE_FT
PRINCIPAL DISCHARGE DIAGNOSIS  Diagnosis: Near syncope  Assessment and Plan of Treatment:       SECONDARY DISCHARGE DIAGNOSES  Diagnosis: Paroxysmal SVT (supraventricular tachycardia)  Assessment and Plan of Treatment:     Diagnosis: PMR (polymyalgia rheumatica)  Assessment and Plan of Treatment:     Diagnosis: Ambulatory dysfunction  Assessment and Plan of Treatment:     Diagnosis: HTN (hypertension)  Assessment and Plan of Treatment: Low salt diet  Activity as tolerated.  Take all medication as prescribed.  Follow up with your medical doctor for routine blood pressure monitoring at your next visit.  Notify your doctor if you have any of the following symptoms:   Dizziness, Lightheadedness, Blurry vision, Headache, Chest pain, Shortness of breath     PRINCIPAL DISCHARGE DIAGNOSIS  Diagnosis: Near syncope  Assessment and Plan of Treatment: You were admitted for near-syncope. Neurology consulted and cardiology following. CTA scan of head and neck showed moderate stenosis in the proximal right internal carotid artery and mild to moderate stenosis in the proximal left internal carotid artery, but otherwise unremarkable. You were given IV fluids. EKG unremarkable. Echocardiogram can be done outpatient as per cardiology. As per neurology, you are not a candidate for intervention for stenosis. Please follow up with your primary care physician, cardiologist and neurologist after discharge for continued monitoring and management.      SECONDARY DISCHARGE DIAGNOSES  Diagnosis: HTN (hypertension)  Assessment and Plan of Treatment: Low salt diet  Activity as tolerated.  Take all medication as prescribed.  Follow up with your medical doctor for routine blood pressure monitoring at your next visit.  Notify your doctor if you have any of the following symptoms:   Dizziness, Lightheadedness, Blurry vision, Headache, Chest pain, Shortness of breath

## 2024-10-23 NOTE — PROGRESS NOTE ADULT - ASSESSMENT
Impression:  Several episodes of near syncope  History of vasovagal reaction however the usual precipitant is during a BM not present yesterday  No other obvious precipitants to yesterday's episodes if vagal.  Although she could have been dehydrated from diarrhea, lab results do not support that and episodes occurred while sitting on a chair or while lying down.    R/O more significant symptoms of possible hypotension due to vertebral and carotid disease    New or worsening anemia.  H/H was 11/36.7 at last office visit 9/3/24.      Aortic stenosis, not severe recently assessed in June.    R/O bradyarrhythmia or less likely tachyarrhythmia but not evident on tele.    No evidence of cardiac ischemia based on normal Troponin.      Plan:   Observe on telemetry.    Neuro to comment on CTA findings.    Await iron studies and ferritin.    Stool guaiac.     Tentative D/C tomorrow with additional evaluation on tele.     Impression:  Several episodes of near syncope  History of vasovagal reaction however the usual precipitant is during a BM not present yesterday  No other obvious precipitants to yesterday's episodes if vagal.  Although she could have been dehydrated from diarrhea, lab results do not support that and episodes occurred while sitting on a chair or while lying down.    R/O more significant symptoms of possible hypotension due to vertebral and carotid disease    New or worsening anemia.  H/H was 11/36.7 at last office visit 9/3/24.      Aortic stenosis, not severe recently assessed in June.    R/O bradyarrhythmia or less likely tachyarrhythmia but not evident on tele.    No evidence of cardiac ischemia based on normal Troponin.      Plan:   Observe on telemetry.    Neuro to comment on CTA findings.    Await iron studies and ferritin.    Stool guaiac.     Tentative D/C tomorrow with additional evaluation on tele.    Restart ASA 81 mg which patient was not compliant at home.    Restart Crestor 40 mg which patient was taking at home.    Lipids in AM.  May need to add Zetia.

## 2024-10-23 NOTE — DISCHARGE NOTE PROVIDER - NSDCMRMEDTOKEN_GEN_ALL_CORE_FT
amLODIPine 5 mg oral tablet: 1 tab(s) orally once a day  aspirin 81 mg oral delayed release tablet: 1 tab(s) orally once a day  cyanocobalamin 1000 mcg oral tablet: 1 tab(s) orally once a day  famotidine 20 mg oral tablet: 1 tab(s) orally once a day  losartan 50 mg oral tablet: 1 tab(s) orally once a day  metoprolol succinate 25 mg oral tablet, extended release: 1 tab(s) orally once a day (at bedtime) MDD: 1  predniSONE 1 mg oral tablet: 3 tab(s) orally once a day  PriLOSEC 20 mg oral delayed release capsule: 1 cap(s) orally once a day  rosuvastatin 40 mg oral tablet: 1 tab(s) orally once a day (at bedtime)  sodium chloride 1 g oral tablet: 1 tab(s) orally once a day  vitamin d 1000u: 1 tablet once a day  Xanax 0.25 mg oral tablet: 1 tab(s) orally once a day as needed for  anxiety   aspirin 81 mg oral delayed release tablet: 1 tab(s) orally once a day  cyanocobalamin 1000 mcg oral tablet: 1 tab(s) orally once a day  famotidine 20 mg oral tablet: 1 tab(s) orally once a day  losartan 50 mg oral tablet: 1 tab(s) orally once a day  metoprolol succinate 25 mg oral tablet, extended release: 1 tab(s) orally once a day (at bedtime) MDD: 1  predniSONE 1 mg oral tablet: 3 tab(s) orally once a day  PriLOSEC 20 mg oral delayed release capsule: 1 cap(s) orally once a day  rosuvastatin 40 mg oral tablet: 1 tab(s) orally once a day (at bedtime)  sodium chloride 1 g oral tablet: 1 tab(s) orally once a day  vitamin d 1000u: 1 tablet once a day  Xanax 0.25 mg oral tablet: 1 tab(s) orally once a day as needed for  anxiety

## 2024-10-23 NOTE — CHART NOTE - NSCHARTNOTEFT_GEN_A_CORE
Pt with episode of dizziness on standing. Pt reports blurry vison and feeling weak when attempted to use BR around 9:10 AM  Prior to this event in the morning pt ambulated to BR to urinate without any symptoms and also walked with PT without any issues    Orthostatic VS - mildly positive  No events on tele    10-23-24 @ 11:00  Lying BP: Orthostatic BP (Lying Systolic): 146/Orthostatic BP (Lying Diastolic (mm Hg)): 66 HR: Orthostatic Pulse (Heart Rate (beats/min)): 62   Sitting BP: Orthostatic BP (Sitting Systolic): 134/Orthostatic BP (Sitting Diastolic (mm Hg)): 64 HR: Orthostatic Pulse (Heart Rate (beats/min)): 70  Standing BP: Orthostatic BP (Standing Systolic): 124/Orthostatic BP (Standing Diastolic (mm Hg)): 60 HR: Orthostatic Pulse (Heart Rate (beats/min)): 73    Discussed with Dr. Figueroa  Plan: Echo   Neurology to comment on the Mod stenosis in the proximal right internal carotid artery, Mild to moderate stenosis in the proximal left internal carotid artery, caused by vessel tortuosity with kinking of the vessel lumen and Moderate stenosis of the left vertebral artery origin.    78-63 Pt with episode of dizziness on standing. Pt reports blurry vison and feeling weak when attempted to use BR around 9:10 AM  Prior to this event in the morning pt ambulated to BR to urinate without any symptoms and also walked with PT without any issues    Orthostatic VS - mildly positive  No events on tele    10-23-24 @ 11:00  Lying BP: Orthostatic BP (Lying Systolic): 146/Orthostatic BP (Lying Diastolic (mm Hg)): 66 HR: Orthostatic Pulse (Heart Rate (beats/min)): 62   Sitting BP: Orthostatic BP (Sitting Systolic): 134/Orthostatic BP (Sitting Diastolic (mm Hg)): 64 HR: Orthostatic Pulse (Heart Rate (beats/min)): 70  Standing BP: Orthostatic BP (Standing Systolic): 124/Orthostatic BP (Standing Diastolic (mm Hg)): 60 HR: Orthostatic Pulse (Heart Rate (beats/min)): 73    Discussed with Dr. Figueroa  Plan: Echo   Neurology to comment on the Mod stenosis in the proximal right internal carotid artery, Mild to moderate stenosis in the proximal left internal carotid artery, caused by vessel tortuosity with kinking of the vessel lumen and Moderate stenosis of the left vertebral artery origin - whether this is contributing to the symptoms    [id number]

## 2024-10-23 NOTE — DISCHARGE NOTE PROVIDER - NSDCFUADDAPPT_GEN_ALL_CORE_FT
APPTS ARE READY TO BE MADE: [ ] YES    Best Family or Patient Contact (if needed):    Additional Information about above appointments (if needed):    1:   2:   3:     Other comments or requests:    APPTS ARE READY TO BE MADE: [x] YES    Best Family or Patient Contact (if needed): Vianey Anthony - daughter [phone]      Additional Information about above appointments (if needed):    1: Cardiology Dr. Isaac Figueroa  2: Neurologist Dr. Ceballos  3:     Other comments or requests:    APPTS ARE READY TO BE MADE: [x] YES    Best Family or Patient Contact (if needed): Vianey Anthony - daughter [phone]      Additional Information about above appointments (if needed):    1: Cardiology Dr. Isaac Figueroa  2: Neurologist Dr. Ceballos  3:     Other comments or requests:   Patient refused to provide their date-of-birth; unable to proceed with referral information.

## 2024-10-23 NOTE — DISCHARGE NOTE PROVIDER - CARE PROVIDER_API CALL
Isaac Figueroa  Cardiovascular Disease  [address], [address]  [place] [address]  Phone: [phone]  Fax: [phone]  Follow Up Time:     Jj Ceballos-Rea  Neurology  [address], [address]  [place] [address]  Phone: [phone]  Fax: [phone]  Follow Up Time:

## 2024-10-23 NOTE — PROGRESS NOTE ADULT - ASSESSMENT
Assessment/plan:    Onychomycosis secondary to dermatophytes ingrown bilateral toenails    Aseptic debridement of mycotic nails 1 through 5 bilateral with Betadine applied  Patient to continue with routine podiatric nail care as an outpatient.

## 2024-10-24 ENCOUNTER — TRANSCRIPTION ENCOUNTER (OUTPATIENT)
Age: 89
End: 2024-10-24

## 2024-10-24 VITALS
OXYGEN SATURATION: 92 % | HEART RATE: 89 BPM | TEMPERATURE: 98 F | SYSTOLIC BLOOD PRESSURE: 119 MMHG | DIASTOLIC BLOOD PRESSURE: 72 MMHG | RESPIRATION RATE: 18 BRPM

## 2024-10-24 LAB
-  AMPICILLIN: SIGNIFICANT CHANGE UP
-  CIPROFLOXACIN: SIGNIFICANT CHANGE UP
-  LEVOFLOXACIN: SIGNIFICANT CHANGE UP
-  NITROFURANTOIN: SIGNIFICANT CHANGE UP
-  TETRACYCLINE: SIGNIFICANT CHANGE UP
-  VANCOMYCIN: SIGNIFICANT CHANGE UP
ANION GAP SERPL CALC-SCNC: 8 MMOL/L — SIGNIFICANT CHANGE UP (ref 5–17)
BASOPHILS # BLD AUTO: 0.02 K/UL — SIGNIFICANT CHANGE UP (ref 0–0.2)
BASOPHILS NFR BLD AUTO: 0.3 % — SIGNIFICANT CHANGE UP (ref 0–2)
BUN SERPL-MCNC: 8 MG/DL — SIGNIFICANT CHANGE UP (ref 7–23)
CALCIUM SERPL-MCNC: 9 MG/DL — SIGNIFICANT CHANGE UP (ref 8.4–10.5)
CHLORIDE SERPL-SCNC: 99 MMOL/L — SIGNIFICANT CHANGE UP (ref 96–108)
CO2 SERPL-SCNC: 28 MMOL/L — SIGNIFICANT CHANGE UP (ref 22–31)
CREAT SERPL-MCNC: 0.69 MG/DL — SIGNIFICANT CHANGE UP (ref 0.5–1.3)
CULTURE RESULTS: ABNORMAL
EGFR: 82 ML/MIN/1.73M2 — SIGNIFICANT CHANGE UP
EOSINOPHIL # BLD AUTO: 0.18 K/UL — SIGNIFICANT CHANGE UP (ref 0–0.5)
EOSINOPHIL NFR BLD AUTO: 2.6 % — SIGNIFICANT CHANGE UP (ref 0–6)
GLUCOSE SERPL-MCNC: 92 MG/DL — SIGNIFICANT CHANGE UP (ref 70–99)
HCT VFR BLD CALC: 32.2 % — LOW (ref 34.5–45)
HGB BLD-MCNC: 10.4 G/DL — LOW (ref 11.5–15.5)
IMM GRANULOCYTES NFR BLD AUTO: 0.1 % — SIGNIFICANT CHANGE UP (ref 0–0.9)
LYMPHOCYTES # BLD AUTO: 1.89 K/UL — SIGNIFICANT CHANGE UP (ref 1–3.3)
LYMPHOCYTES # BLD AUTO: 26.9 % — SIGNIFICANT CHANGE UP (ref 13–44)
MCHC RBC-ENTMCNC: 27.6 PG — SIGNIFICANT CHANGE UP (ref 27–34)
MCHC RBC-ENTMCNC: 32.3 GM/DL — SIGNIFICANT CHANGE UP (ref 32–36)
MCV RBC AUTO: 85.4 FL — SIGNIFICANT CHANGE UP (ref 80–100)
METHOD TYPE: SIGNIFICANT CHANGE UP
MONOCYTES # BLD AUTO: 0.84 K/UL — SIGNIFICANT CHANGE UP (ref 0–0.9)
MONOCYTES NFR BLD AUTO: 11.9 % — SIGNIFICANT CHANGE UP (ref 2–14)
NEUTROPHILS # BLD AUTO: 4.09 K/UL — SIGNIFICANT CHANGE UP (ref 1.8–7.4)
NEUTROPHILS NFR BLD AUTO: 58.2 % — SIGNIFICANT CHANGE UP (ref 43–77)
NRBC # BLD: 0 /100 WBCS — SIGNIFICANT CHANGE UP (ref 0–0)
ORGANISM # SPEC MICROSCOPIC CNT: ABNORMAL
ORGANISM # SPEC MICROSCOPIC CNT: ABNORMAL
PLATELET # BLD AUTO: 262 K/UL — SIGNIFICANT CHANGE UP (ref 150–400)
POTASSIUM SERPL-MCNC: 4.4 MMOL/L — SIGNIFICANT CHANGE UP (ref 3.5–5.3)
POTASSIUM SERPL-SCNC: 4.4 MMOL/L — SIGNIFICANT CHANGE UP (ref 3.5–5.3)
RBC # BLD: 3.77 M/UL — LOW (ref 3.8–5.2)
RBC # FLD: 14.2 % — SIGNIFICANT CHANGE UP (ref 10.3–14.5)
SODIUM SERPL-SCNC: 135 MMOL/L — SIGNIFICANT CHANGE UP (ref 135–145)
SPECIMEN SOURCE: SIGNIFICANT CHANGE UP
WBC # BLD: 7.03 K/UL — SIGNIFICANT CHANGE UP (ref 3.8–10.5)
WBC # FLD AUTO: 7.03 K/UL — SIGNIFICANT CHANGE UP (ref 3.8–10.5)

## 2024-10-24 RX ORDER — ROSUVASTATIN CALCIUM 10 MG
1 TABLET ORAL
Refills: 0 | DISCHARGE

## 2024-10-24 RX ORDER — ASPIRIN/MAG CARB/ALUMINUM AMIN 325 MG
1 TABLET ORAL
Refills: 0 | DISCHARGE

## 2024-10-24 RX ORDER — ROSUVASTATIN CALCIUM 10 MG
1 TABLET ORAL
Qty: 30 | Refills: 0
Start: 2024-10-24 | End: 2024-11-22

## 2024-10-24 RX ORDER — AMLODIPINE BESYLATE 10 MG
1 TABLET ORAL
Refills: 0 | DISCHARGE

## 2024-10-24 RX ORDER — ASPIRIN/MAG CARB/ALUMINUM AMIN 325 MG
1 TABLET ORAL
Qty: 30 | Refills: 0
Start: 2024-10-24 | End: 2024-11-22

## 2024-10-24 RX ADMIN — SODIUM CHLORIDE 1 GRAM(S): 9 INJECTION, SOLUTION INTRAMUSCULAR; INTRAVENOUS; SUBCUTANEOUS at 11:46

## 2024-10-24 RX ADMIN — Medication 1000 MICROGRAM(S): at 11:45

## 2024-10-24 RX ADMIN — Medication 5 MILLIGRAM(S): at 06:03

## 2024-10-24 RX ADMIN — Medication 81 MILLIGRAM(S): at 11:45

## 2024-10-24 RX ADMIN — Medication 3 MILLIGRAM(S): at 06:03

## 2024-10-24 RX ADMIN — LOSARTAN POTASSIUM 50 MILLIGRAM(S): 25 TABLET ORAL at 06:04

## 2024-10-24 RX ADMIN — Medication 1000 UNIT(S): at 11:45

## 2024-10-24 RX ADMIN — PANTOPRAZOLE SODIUM 40 MILLIGRAM(S): 40 TABLET, DELAYED RELEASE ORAL at 06:03

## 2024-10-24 NOTE — PROGRESS NOTE ADULT - ASSESSMENT
Impression:  No arrhythmic cause of episodes.  Mild orthostasis however BP only dropped to 120 yesterday.                          Moderate vertebral and ICA stenosis.                             Mild anemia with low iron but stable counts since after hydration in ER.    Plan:  Discontinue amlodipine. Will allow for some hypertension assess assess if recurrence of symptoms.             D/C to home.             Echo has been ordered but OK to D/ C without it being done.  Can perform in office.

## 2024-10-24 NOTE — PHARMACOTHERAPY INTERVENTION NOTE - COMMENTS
Patient counseled on the following discharge medications names (brand/generic), dosing, indication, and possible side effects:    Medication(s) to Discontinue  Amlodipine 5 mg Tablet: 1 tab orally once a day    Medications To Continue  aspirin 81 mg oral delayed release tablet: 1 tab(s) orally once a day  cyanocobalamin 1000 mcg oral tablet: 1 tab(s) orally once a day  famotidine 20 mg oral tablet: 1 tab(s) orally once a day  losartan 50 mg oral tablet: 1 tab(s) orally once a day  metoprolol succinate 25 mg oral tablet, extended release: 1 tab(s) orally once a day (at bedtime)  predniSONE 1 mg oral tablet: 3 tab(s) orally once a day  PriLOSEC 20 mg oral delayed release capsule: 1 cap(s) orally once a day  rosuvastatin 40 mg oral tablet: 1 tab(s) orally once a day (at bedtime)  sodium chloride 1 g oral tablet: 1 tab(s) orally once a day  vitamin d 1000u: 1 tablet once a day  Xanax 0.25 mg oral tablet: 1 tab(s) orally once a day as needed for anxiety      Patient demonstrated understanding through verbalization and teach back method. Patient comprehends why she needs to discontinue Amlodipine at home.    Patient questions and concerns were answered and addressed.    Dom Jacome, PharmD Candidate 2025  [facility] Student   Patient counseled on the following discharge medications names (brand/generic), dosing, indication, and possible side effects:    Medication(s) to Discontinue  Amlodipine 5 mg Tablet: 1 tab orally once a day (patient experiencing dizziness and BP at goal off medication)    Medications To Continue  aspirin 81 mg oral delayed release tablet: 1 tab(s) orally once a day (refill sent to pharmacy)  cyanocobalamin 1000 mcg oral tablet: 1 tab(s) orally once a day  famotidine 20 mg oral tablet: 1 tab(s) orally once a day  losartan 50 mg oral tablet: 1 tab(s) orally once a day  metoprolol succinate 25 mg oral tablet, extended release: 1 tab(s) orally once a day (at bedtime)  predniSONE 1 mg oral tablet: 3 tab(s) orally once a day  PriLOSEC 20 mg oral delayed release capsule: 1 cap(s) orally once a day  rosuvastatin 40 mg oral tablet: 1 tab(s) orally once a day (at bedtime) (refill sent to pharmacy)  sodium chloride 1 g oral tablet: 1 tab(s) orally once a day  vitamin d 1000u: 1 tablet once a day  Xanax 0.25 mg oral tablet: 1 tab(s) orally once a day as needed for anxiety    Patient demonstrated understanding through verbalization and teach back method. Patient comprehends why she needs to discontinue Amlodipine at home.    Patient questions and concerns were answered and addressed.    Dom Jacome, PharmD Candidate 2025  [facility] Student    Angelica Qui PharmD, BCPS  Clinical Pharmacy Specialist  Te[employer] (preferred) or [phone]

## 2024-10-24 NOTE — DISCHARGE NOTE NURSING/CASE MANAGEMENT/SOCIAL WORK - NSDCFUADDAPPT_GEN_ALL_CORE_FT
APPTS ARE READY TO BE MADE: [x] YES    Best Family or Patient Contact (if needed): Vianey Anthony - daughter [phone]      Additional Information about above appointments (if needed):    1: Cardiology Dr. Isaac Figueroa  2: Neurologist Dr. Ceballos  3:     Other comments or requests:

## 2024-10-24 NOTE — PROGRESS NOTE ADULT - SUBJECTIVE AND OBJECTIVE BOX
Podiatry pager #: [phone]/ [phone]    Patient is a 89y old  Female who presents with a chief complaint of Presyncopal episodes, diarrhea (23 Oct 2024 08:40) Podiatry consulted for painful tender thickened ingrown toenails of both feet aggravated by shoe gear ambulation and palpation.      HPI:  89 y/o female w/ PMHx PSVT s/p ablation, HTN, PMR, moderate AS, GERD, and IBS presenting for near-syncope. Has had 3 episodes today where she had lightheadedness associated with blurry vision. In the morning, she was eating some oatmeal, and she had her first episode where she felt her vision going blurry as well as her consciousness fading. She had some smelling salts next to her, so she was able to grab it and break the episode prior to actually losing consciousness. She was then moved to her recliner cough. Then in the afternoon, she had another episode of near-syncope while sitting in her recliner couch that her family saw. She was not doing any specific activity at the time. The episode passed, and her family was about to drive her to the hospital after checking her BP, but she had another episode shortly while getting her BP checked, so they decided to call EMS. Per the patient, she may have had another episode en route to the hospital, and she vaguely remember one of the EMS personnel stating something about seeing SVT on the monitor but she is unsure. She has a history of vasovagal syncope associated w/ having BMs in the past. For the past week, she was having multiple episodes of loose, watery stools daily, has been taking imodium. She thought maybe her near-syncope episodes were due to dehydration. Hasn't had diarrhea for the past 2 days. Came to the ED for further evaluation.    In the ED, she was afebrile and hemodynamically stable, saturating well on RA. CBC w/ normocytic anemia of Hb 10.7, CMP w/ mild hyponatremia of 133, other electrolytes wnl. ECG NSR w/ 1st-degree AV block w/ PACs. Received 1L IVNS and Zofran in the ED.  (21 Oct 2024 19:33)      PAST MEDICAL & SURGICAL HISTORY:  Hypertension      Gastroesophageal reflux disease, esophagitis presence not specified      History of polymyalgia rheumatica      Paroxysmal SVT (supraventricular tachycardia)      IBS (irritable bowel syndrome)      History of cataract extraction, unspecified laterality          MEDICATIONS  (STANDING):  amLODIPine   Tablet 5 milliGRAM(s) Oral daily  aspirin  chewable 81 milliGRAM(s) Oral daily  cholecalciferol 1000 Unit(s) Oral daily  cyanocobalamin 1000 MICROGram(s) Oral daily  famotidine    Tablet 20 milliGRAM(s) Oral daily  IBgard with peppermint oil 1 Capsule(s) 1 Capsule(s) Oral <User Schedule>  losartan 50 milliGRAM(s) Oral daily  metoprolol succinate ER 25 milliGRAM(s) Oral at bedtime  pantoprazole    Tablet 40 milliGRAM(s) Oral before breakfast  predniSONE   Tablet 3 milliGRAM(s) Oral daily  rosuvastatin 40 milliGRAM(s) Oral at bedtime  sodium chloride 1 Gram(s) Oral daily    MEDICATIONS  (PRN):  acetaminophen     Tablet .. 650 milliGRAM(s) Oral every 6 hours PRN Temp greater or equal to 38C (100.4F), Mild Pain (1 - 3)  ALPRAZolam 0.25 milliGRAM(s) Oral daily PRN for anxiety  melatonin 3 milliGRAM(s) Oral at bedtime PRN Insomnia      Allergies    adhesives (Other)  latex (Rash)  penicillins (Hives)    Intolerances        VITALS:    Vital Signs Last 24 Hrs  T(C): 36.8 (23 Oct 2024 10:52), Max: 36.8 (23 Oct 2024 04:29)  T(F): 98.2 (23 Oct 2024 10:52), Max: 98.2 (23 Oct 2024 04:29)  HR: 60 (23 Oct 2024 10:52) (57 - 69)  BP: 133/66 (23 Oct 2024 10:52) (104/59 - 144/73)  BP(mean): --  RR: 18 (23 Oct 2024 10:52) (18 - 18)  SpO2: 96% (23 Oct 2024 10:52) (96% - 98%)    Parameters below as of 23 Oct 2024 10:52  Patient On (Oxygen Delivery Method): room air        LABS:                          10.0   7.66  )-----------( 250      ( 23 Oct 2024 06:17 )             31.1       10-22    134[L]  |  99  |  6[L]  ----------------------------<  91  3.7   |  28  |  0.70    Ca    8.7      22 Oct 2024 07:17  Phos  3.2     10-21  Mg     2.2     10-21    TPro  5.4[L]  /  Alb  3.2[L]  /  TBili  0.3  /  DBili  x   /  AST  16  /  ALT  9[L]  /  AlkPhos  42  10-22      CAPILLARY BLOOD GLUCOSE              LOWER EXTREMITY PHYSICAL EXAM:    Vasular: DP/PT _1/4, B/L, CFT <_2 seconds B/L, Temperature gradient wnl_, B/L.   Neuro: Epicritic sensation _intact to the level of _toes, B/L.  Skin: Positive dystrophic hypertrophic ingrown/incurvated toenails with subungual debris to all 10 digits.  No open ulcerations or clinical signs of infection.      RADIOLOGY & ADDITIONAL STUDIES:    
Admitting Diagnosis:  Syncope and collapse [R55]  SYNCOPE AND COLLAPSE        HPI:  This is a 89y year old Female with the below past medical history who presents with the chief complaint of 3-4 episode of pre-syncope in 1 day without LOC. has hx of ablation 6/24 for SVT and newly on amlodipine since then , states feels well since then wihtout "skipping" feeling of heart. no hx of seizures. hx of vasovagal syncope with BMs in past. past week has diarrhea/loose stools , hx of IBS. states does not think she is dehydrated, takes in good about of fluid daily. was given bolus IVFs when she arrived in ER. orthostatics done today were without significant drop with position change per patient. no fevers. no change in meds or focal deficits. no headache. no seizure-like activity. was seen in my group office for neuropathy with Dr Ceballos scheduled for EMG testing next month. no meds for neuropathy. crt 1.33, 2 daughters bedside witnessed episodes at home, no abnormal shaking or LOC observed. uses cane for ambulation outside of home.    She states on 10/23, getting from chair to standing, she felt dizzy, no LOC, but vision got blurry, resolved when laid down.  as per RN, she had some orthostatis  Pt denies focal weakness, numbness, changes in speech, swallow, vision, bowel or bladder control.     Past Medical History:  Hypertension [I10]    Gastroesophageal reflux disease, esophagitis presence not specified [K21.9]    Hypertension [I10]    History of polymyalgia rheumatica [Z87.39]    Paroxysmal SVT (supraventricular tachycardia) [I47.10]    IBS (irritable bowel syndrome) [K58.9]        Past Surgical History:  History of cataract extraction, unspecified laterality [Z98.49]        Social History:  No toxic habits    Family History:  FAMILY HISTORY:  Family history of acute myocardial infarction        Allergies:  adhesives (Other)  latex (Rash)  penicillins (Hives)      ROS:  Constitutional: Patient offers no complaints of fevers or significant weight loss  Ears, Nose, Mouth and Throat: The patient presents with no abnormalities of the head, ears, eyes, nose or throat  Skin: Patient offers no concerns of new rashes or lesions  Respiratory: The patient presents with no abnormalities of the respiratory tract  Cardiovascular: The patient presents with no cardiac abnormalities  Gastrointestinal: The patient presents with no abnormalities of the GI system  Genitourinary: The patient presents with no dysuria, hematuria or frequent urination  Neurological: See HPI  Endocrine: Patient offers no complaints of excessive thirst, urination, or heat/cold intolerance    Advanced care planning reviewed and noted in the chart.    Medications:  acetaminophen     Tablet .. 650 milliGRAM(s) Oral every 6 hours PRN  ALPRAZolam 0.25 milliGRAM(s) Oral daily PRN  amLODIPine   Tablet 5 milliGRAM(s) Oral daily  cholecalciferol 1000 Unit(s) Oral daily  cyanocobalamin 1000 MICROGram(s) Oral daily  famotidine    Tablet 20 milliGRAM(s) Oral daily  IBgard with peppermint oil 1 Capsule(s) 1 Capsule(s) Oral <User Schedule>  losartan 50 milliGRAM(s) Oral daily  melatonin 3 milliGRAM(s) Oral at bedtime PRN  metoprolol succinate ER 25 milliGRAM(s) Oral daily  pantoprazole    Tablet 40 milliGRAM(s) Oral before breakfast  predniSONE   Tablet 3 milliGRAM(s) Oral daily  sodium chloride 1 Gram(s) Oral daily      Labs:  CBC Full  -  ( 22 Oct 2024 07:18 )  WBC Count : 6.24 K/uL  RBC Count : 3.66 M/uL  Hemoglobin : 9.9 g/dL  Hematocrit : 31.2 %  Platelet Count - Automated : 251 K/uL  Mean Cell Volume : 85.2 fl  Mean Cell Hemoglobin : 27.0 pg  Mean Cell Hemoglobin Concentration : 31.7 gm/dL  Auto Neutrophil # : x  Auto Lymphocyte # : x  Auto Monocyte # : x  Auto Eosinophil # : x  Auto Basophil # : x  Auto Neutrophil % : x  Auto Lymphocyte % : x  Auto Monocyte % : x  Auto Eosinophil % : x  Auto Basophil % : x    10-22    134[L]  |  99  |  6[L]  ----------------------------<  91  3.7   |  28  |  0.70    Ca    8.7      22 Oct 2024 07:17  Phos  3.2     10-21  Mg     2.2     10-21    TPro  5.4[L]  /  Alb  3.2[L]  /  TBili  0.3  /  DBili  x   /  AST  16  /  ALT  9[L]  /  AlkPhos  42  10-22    CAPILLARY BLOOD GLUCOSE        LIVER FUNCTIONS - ( 22 Oct 2024 07:17 )  Alb: 3.2 g/dL / Pro: 5.4 g/dL / ALK PHOS: 42 U/L / ALT: 9 U/L / AST: 16 U/L / GGT: x             Urinalysis Basic - ( 22 Oct 2024 07:17 )    Color: x / Appearance: x / SG: x / pH: x  Gluc: 91 mg/dL / Ketone: x  / Bili: x / Urobili: x   Blood: x / Protein: x / Nitrite: x   Leuk Esterase: x / RBC: x / WBC x   Sq Epi: x / Non Sq Epi: x / Bacteria: x      Female    Vitals:  Vital Signs Last 24 Hrs  T(C): 36.8 (22 Oct 2024 11:08), Max: 36.8 (22 Oct 2024 00:42)  T(F): 98.3 (22 Oct 2024 11:08), Max: 98.3 (22 Oct 2024 00:42)  HR: 55 (22 Oct 2024 11:08) (55 - 93)  BP: 133/63 (22 Oct 2024 11:08) (110/61 - 147/66)  BP(mean): 76 (21 Oct 2024 18:48) (76 - 76)  RR: 18 (22 Oct 2024 11:08) (16 - 18)  SpO2: 96% (22 Oct 2024 11:08) (95% - 99%)    Parameters below as of 22 Oct 2024 11:08  Patient On (Oxygen Delivery Method): room air        NEUROLOGICAL EXAM:    Mental status: Awake, alert, and in no apparent distress. Oriented to person, place and time. Language function is normal. Recent memory, digit span and concentration were normal. in good spirits.     Cranial Nerves: Pupils were equal, round, reactive to light. Extraocular movements were intact. Visual field were full. Fundoscopic exam was deferred. Facial sensation was intact to light touch. There was no facial asymmetry. The palate was upgoing symmetrically and tongue was midline. Hearing acuity was decreased , hearing aides being charged. Shoulder shrug was full bilaterally    Motor exam: Bulk and tone were normal. agx 4 independently. Fine finger movements were symmetric and normal. There was no pronator drift    Reflexes: deferred DTRs. Toes were downgoing bilaterally.     Sensation: Intact to light touch, grossly b/l ue and le    Coordination: Finger-nose-finger and heel-to-shin was without dysmetria.     Gait: defered sitting in bedside chair    < from: CT Head No Cont (10.22.24 @ 18:35) >    ACC: [id number] EXAM:  CT ANGIO BRAIN (W)AW IC   ORDERED BY: LISSETTE JIMENEZ     ACC: [id number] EXAM:  CT ANGIO NECK (W)AW IC   ORDERED BY: LISSETTE JIMENEZ     ACC: [id number] EXAM:  CT BRAIN   ORDERED BY: LISSETTE JIMENEZ     PROCEDURE DATE:  10/22/2024          INTERPRETATION:  CLINICAL INFORMATION: Near syncope and blurred vision.    Symptoms started yesterday.  Supraventricular tachycardia, status post   ablation. Polymyalgia rheumatica.    COMPARISON: CTA brain from 04/14/2024.    CONTRAST:  IV Contrast: Omnipaque 300  70 cc administered  30 cc discarded  Complications: None reported at time of study completion    TECHNIQUE:  CT BRAIN: Serial axial images were obtained from the skull base to the   vertex without the use of contrast.    CTA NECK/HEAD: After the intravenous power injection of non-ionic   contrast material, serial thin sections were obtained through the   cervical and intracranial circulation on a multislice CT scanner. Axial,   Coronal and Sagittal MIP reformatted images wereobtained. 3D   reconstruction was also performed.    FINDINGS:    CT BRAIN:    VENTRICLES AND SULCI: Normal in size and configuration.  INTRA-AXIAL: No mass effect, acute hemorrhage, or midline shift.  A few   patchy periventricular white matter hypodensities are consistent with   minimal microvascular type changes.  EXTRA-AXIAL: No mass or fluid collection. Basal cisterns are normal in   appearance.    VISUALIZED SINUSES:  Clear.  TYMPANOMASTOID CAVITIES:  Clear.  VISUALIZED ORBITS: Bilateral lens replacement.  CALVARIUM: Intact.    MISCELLANEOUS: None.      CTA NECK:    AORTIC ARCH AND VISUALIZED GREAT VESSELS: Three-vessel arch with mild   atherosclerotic changes.  No significant stenosis.    RIGHT:  COMMON CAROTID ARTERY: No significant stenosis to the carotid bifurcation.  INTERNAL CAROTID ARTERY: A moderate stenosis in the right right carotid   bulb extending into the proximal internal carotid artery measures greater   than 50% based on NASCET criteria.  VERTEBRAL ARTERY: Atherosclerotic calcification causes minimal stenosis   at the vessel origin and proximal V1 segment.  There is tortuosity of the   V1 segment with a vascular loop.  No significant stenosis in the V2 or V3   segments.    LEFT:  COMMON CAROTID ARTERY: No significant stenosis to the carotid bifurcation.  INTERNAL CAROTID ARTERY: There atherosclerotic plaque at the carotid   bifurcation, without significant stenosis based on NASCET criteria.    There is a mild-to-moderate stenosis in the proximal left internal  carotid artery caused vessel tortuosity with kinking of the vessel lumen.  VERTEBRAL ARTERY: Moderate stenosis of the vessel origin.  No significant   stenosis in the V2 or V3 segments.    VISUALIZED LUNGS: Pleural parenchymal scarring at the lung apices.    MISCELLANEOUS: Multilevel cervical spine degenerative changes.  At C2-C3,   C3-C4 and C4-C5 there is mild spinal canal stenosis and mild neural   foraminal narrowing.  At C5-C6, there is mild-to-moderate spinal canal   stenosis and moderateneural foraminal narrowing.  At C6-C7 there is mild   spinal canal stenosis and mild-to-moderate neural foraminal narrowing.    CAROTID STENOSIS REFERENCE: Percent (%) stenosis is expressed in terms of   NASCET Criteria. (NASCET = 100x1-(N/D)). N=greatest narrowing. D=normal   distal diameter - MILD = <50% stenosis. - MODERATE = 50-69% stenosis. -   SEVERE = 70-89% stenosis. - HAIRLINE/CRITICAL = 90-99% stenosis. -   OCCLUDED = 100% stenosis.      CTA HEAD:    INTERNAL CAROTID ARTERIES: Mild atherosclerotic calcification the   precavernous, cavernous and supraclinoid segments bilaterally.  No   flow-limiting stenosis or occlusion..    [place]: Questionable small 2.5-3 mm laterally directed   extradural aneurysm in the cavernous segment of the right internal   carotid artery (5:350), unchanged from prior.  No intradural aneurysm   identified. Tiny aneurysms can be beyond the resolution of CTA technique.    ANTERIOR CEREBRAL ARTERIES: No flow-limiting stenosis or occlusion.  The   right A1 segment is mildly hypoplastic.  The anterior communicating   artery is patent.  MIDDLE CEREBRAL ARTERIES: No flow-limiting stenosis or occlusion.  POSTERIOR CEREBRAL ARTERIES: No flow-limiting stenosis or occlusion.    DISTAL VERTEBRAL / BASILAR ARTERIES: Atherosclerotic plaque causes mild   stenoses in the intradural vertebral arteries bilaterally, which do not   appear to be flow limiting.  No folding stenosis or occlusion.  The   distal vertebral arteries are codominant.  Posterior inferior cerebellar   arteries and superior cerebellar arteries are patent bilaterally.    Suggestion of tiny anterior inferior cerebellar arteries, not well   visualized by CT technique.  There is extradural origin of the left   posterior inferior cerebellar artery.    VENOUS STRUCTURES: Visualized superficial and deep venous structures   appear patent.    MISCELLANEOUS: No other vascular abnormality is seen.      IMPRESSION:    CT HEAD:  No CT evidence of acute intracranial pathology.    CTA NECK:  Moderate stenosis in the proximal right carotid bulb extending into the   proximal right internal carotid artery, measuring greater than 50% using   NASCET criteria.    Mild to moderate stenosis in the proximal left internal carotid artery,   caused by vessel tortuosity with kinking of the vessel lumen.    Moderate stenosis of the left vertebral artery origin.    Minimal stenosis the right vertebral artery origin and right V1 segment.    The findings are significantly changed from 04/14/2024.    CTA HEAD:  No major vessel occlusion or flow limiting stenosis about the [place].    Mild stenoses in the intradural vertebral arteries bilaterally, which do   not appear to be flow limiting.    A questionable 2.5-3 mm extradural aneurysm arising from the cavernous   segment right internal carotid artery is unchanged from 04/14/2024.    No intradural aneurysm is identified about the [place].        --- End of Report ---            MALCOLM MACIEL MD; Attending Radiologist  This document has been electronically signed. Oct 22 2024  7:39PM    < end of copied text >    
No further episodes of near syncope.    No significant arrhythmias tachy or ra.    NO orthostasis yesterday.    CTA head and neck revealed:     Moderate stenosis in the proximal right carotid bulb extending into the   proximal right internal carotid artery, measuring greater than 50% using   NASCET criteria.    Mild to moderate stenosis in the proximal left internal carotid artery,   caused by vessel tortuosity with kinking of the vessel lumen.    Moderate stenosis of the left vertebral artery origin.    Minimal stenosis the right vertebral artery origin and right V1 segment.    The findings are significantly changed from 04/14/2024.          REVIEW OF SYSTEMS:  CARDIOVASCULAR: No chest pain, dyspnea or palpitations  All other review of systems is negative unless indicated above    Medications:  acetaminophen     Tablet .. 650 milliGRAM(s) Oral every 6 hours PRN  ALPRAZolam 0.25 milliGRAM(s) Oral daily PRN  amLODIPine   Tablet 5 milliGRAM(s) Oral daily  aspirin  chewable 81 milliGRAM(s) Oral daily  cholecalciferol 1000 Unit(s) Oral daily  cyanocobalamin 1000 MICROGram(s) Oral daily  famotidine    Tablet 20 milliGRAM(s) Oral daily  IBgard with peppermint oil 1 Capsule(s) 1 Capsule(s) Oral <User Schedule>  losartan 50 milliGRAM(s) Oral daily  melatonin 3 milliGRAM(s) Oral at bedtime PRN  metoprolol succinate ER 25 milliGRAM(s) Oral at bedtime  pantoprazole    Tablet 40 milliGRAM(s) Oral before breakfast  predniSONE   Tablet 3 milliGRAM(s) Oral daily  rosuvastatin 40 milliGRAM(s) Oral at bedtime  sodium chloride 1 Gram(s) Oral daily      Physical Exam:  Vitals:  T(C): 36.8 (10-23-24 @ 04:29), Max: 36.8 (10-22-24 @ 11:08)  HR: 69 (10-23-24 @ 04:29) (55 - 69)  BP: 104/59 (10-23-24 @ 04:29) (104/59 - 133/63)  BP(mean): --  RR: 18 (10-23-24 @ 04:29) (18 - 18)  SpO2: 98% (10-23-24 @ 04:29) (96% - 98%)  Wt(kg): --  Daily     Daily   I&O's Summary    22 Oct 2024 07:01  -  23 Oct 2024 07:00  --------------------------------------------------------  IN: 1080 mL / OUT: 0 mL / NET: 1080 mL      Appearance:  Normal, NAD  Eyes:   EOMI  HEENT: Normal oral mucosa NC/AT  Neck:  No JVD or HJR  Respiratory: Clear to auscultation bilaterally  Cardiovascular: S1 obscured by 1/6 GREGORIO base and LSB.  S2 single.  No rubs or gallops  Abdomen:   BS normal, Soft,  Non-tender without organomegaly or masses  Extremities: Without edema    10-23     Complete Blood Count in AM (10.23.24 @ 06:17)   Nucleated RBC: 0 /100 WBCs  WBC Count: 7.66 K/uL  RBC Count: 3.67 M/uL  Hemoglobin: 10.0 g/dL  Hematocrit: 31.1 %  Mean Cell Volume: 84.7 fl  Mean Cell Hemoglobin: 27.2 pg  Mean Cell Hemoglobin Conc: 32.2 gm/dL  Red Cell Distrib Width: 14.1 %  Platelet Count - Automated: 250 K/uL    BMP  FE studies pending        10-22    134[L]  |  99  |  6[L]  ----------------------------<  91  3.7   |  28  |  0.70    Ca    8.7      22 Oct 2024 07:17  Phos  3.2     10-21  Mg     2.2     10-21    TPro  5.4[L]  /  Alb  3.2[L]  /  TBili  0.3  /  DBili  x   /  AST  16  /  ALT  9[L]  /  AlkPhos  42  10-22            
Yesterday morning event noted of lightheadedness upon standing after breakfast having been sitting for 1 1/2 hours.  Subsequent orthostatics obtained and BP went from 146 lying down to 124 standing.    Episodes at home were generally while sitting or lying.    No significant arrythmia on tele during episode or at all during this admission.        REVIEW OF SYSTEMS:  CARDIOVASCULAR: No chest pain, dyspnea or palpitations  All other review of systems is negative unless indicated above    Medications:  acetaminophen     Tablet .. 650 milliGRAM(s) Oral every 6 hours PRN  ALPRAZolam 0.25 milliGRAM(s) Oral daily PRN  amLODIPine   Tablet 5 milliGRAM(s) Oral daily  aspirin  chewable 81 milliGRAM(s) Oral daily  cholecalciferol 1000 Unit(s) Oral daily  cyanocobalamin 1000 MICROGram(s) Oral daily  famotidine    Tablet 20 milliGRAM(s) Oral daily  IBgard with peppermint oil 1 Capsule(s) 1 Capsule(s) Oral <User Schedule>  losartan 50 milliGRAM(s) Oral daily  melatonin 3 milliGRAM(s) Oral at bedtime PRN  metoprolol succinate ER 25 milliGRAM(s) Oral at bedtime  pantoprazole    Tablet 40 milliGRAM(s) Oral before breakfast  predniSONE   Tablet 3 milliGRAM(s) Oral daily  rosuvastatin 40 milliGRAM(s) Oral at bedtime  sodium chloride 1 Gram(s) Oral daily      Physical Exam:  Vitals:  T(C): 36.6 (10-24-24 @ 04:11), Max: 37.1 (10-23-24 @ 21:57)  HR: 60 (10-24-24 @ 04:11) (60 - 75)  BP: 132/60 (10-24-24 @ 04:11) (128/69 - 169/66)  BP(mean): --  RR: 18 (10-24-24 @ 04:11) (18 - 18)  SpO2: 98% (10-24-24 @ 04:11) (95% - 98%)  Wt(kg): --  Daily     Daily   I&O's Summary      Appearance:  Normal, NAD  Eyes:   EOMI  HEENT: Normal oral mucosa NC/AT  Neck:  No JVD or HJR  Respiratory: Clear to auscultation bilaterally  Cardiovascular: S1 obscured by 1/6 GREGORIO base and LSB.  S2 single.  No rubs or gallops  Abdomen:   BS normal, Soft,  Non-tender without organomegaly or masses  Extremities: Without edema        10-24    Complete Blood Count + Automated Diff (10.24.24 @ 06:21)   WBC Count: 7.03 K/uL  RBC Count: 3.77 M/uL  Hemoglobin: 10.4 g/dL  Hematocrit: 32.2 %  Mean Cell Volume: 85.4 fl  Mean Cell Hemoglobin: 27.6 pg  Mean Cell Hemoglobin Conc: 32.3 gm/dL  Red Cell Distrib Width: 14.2 %  Platelet Count - Automated: 262 K/uL  Auto Neutrophil #: 4.09 K/uL  Auto Lymphocyte #: 1.89 K/uL  Auto Monocyte #: 0.84 K/uL  Auto Eosinophil #: 0.18 K/uL  Auto Basophil #: 0.02 K/uL  Auto Neutrophil %: 58.2: Differential percentages must be correlated with absolute numbers for   clinical significance. %  Auto Lymphocyte %: 26.9 %  Auto Monocyte %: 11.9 %  Auto Eosinophil %: 2.6 %  Auto Basophil %: 0.3 %  Auto Immature Granulocyte %: 0.1: (Includes meta, myelo and promyelocytes). Mild elevations in immature   granulocytes may be seen with many inflammatory processes and pregnancy;   clinical correlation suggested. %  Nucleated RBC: 0 /100 WBCs    135  |  99  |  8   ----------------------------<  92  4.4   |  28  |  0.69    Ca    9.0      24 Oct 2024 06:26

## 2024-10-24 NOTE — PROGRESS NOTE ADULT - REASON FOR ADMISSION
Presyncopal episodes, diarrhea

## 2024-10-24 NOTE — DISCHARGE NOTE NURSING/CASE MANAGEMENT/SOCIAL WORK - PATIENT PORTAL LINK FT
You can access the FollowMyHealth Patient Portal offered by [facility] by registering at the following website: [email or web address]. By joining [employer]’s FollowMyHealth portal, you will also be able to view your health information using other applications (apps) compatible with our system.

## 2024-10-24 NOTE — DISCHARGE NOTE NURSING/CASE MANAGEMENT/SOCIAL WORK - FINANCIAL ASSISTANCE
[facility] provides services at a reduced cost to those who are determined to be eligible through [facility]’s financial assistance program. Information regarding [facility]’s financial assistance program can be found by going to [email or web address] or by calling [phone].

## 2024-10-24 NOTE — PROGRESS NOTE ADULT - ASSESSMENT
This is a 89y year old Female with the below past medical history who presents with the chief complaint of 3-4 episode of pre-syncope in 1 day without LOC. has hx of ablation 6/24 for SVT and newly on amlodipine since then , states feels well since then wihtout "skipping" feeling of heart. no hx of seizures. hx of vasovagal syncope with BMs in past. past week has diarrhea/loose stools , hx of IBS. states does not think she is dehydrated, takes in good about of fluid daily. was given bolus IVFs when she arrived in ER. orthostatics done today were without significant drop with position change per patient. no fevers. no change in meds or focal deficits. no headache. no seizure-like activity. was seen in my group office for neuropathy with Dr Ceballos scheduled for EMG testing next month. no meds for neuropathy. crt 1.33, 2 daughters bedside witnessed episodes at home, no abnormal shaking or LOC observed. uses cane for ambulation outside of home.    pre-syncope episodes without LOC    recs:  -no features to suggest seizure episodes  -defer need for LP or EEG  -CVA/TIA not suspected    As far as cta findings, these stenosis are likely not contributory to her symptoms, stenosis when symptomatic causes strokes, there is no evidence of such  regardless she is not a candidate for intervention, the only treatment is plaque stabilization which would be done with a statin, concur with dr. figueroa plan to restart her statin  small aneurysm not relevant to this, can be followed as outpt    At this time, no further inpt neurological treatment recommendations, concur with plans of medical team  spent over 60 min, over 1/2 time reviewing films, discussing with pt and kiana (on phone in presence of pt), the RN and then Dr. Figueroa  BP control as per medicine  told pt and RN impt of being OOB to chair  no neuro contraindication to discharge when medically ready  follow up neuro office as planned with Dr Ceballos, [phone]  reconsult prn

## 2024-11-26 PROCEDURE — 36415 COLL VENOUS BLD VENIPUNCTURE: CPT

## 2024-11-26 PROCEDURE — 80053 COMPREHEN METABOLIC PANEL: CPT

## 2024-11-26 PROCEDURE — 87186 SC STD MICRODIL/AGAR DIL: CPT

## 2024-11-26 PROCEDURE — 83735 ASSAY OF MAGNESIUM: CPT

## 2024-11-26 PROCEDURE — 87077 CULTURE AEROBIC IDENTIFY: CPT

## 2024-11-26 PROCEDURE — 82607 VITAMIN B-12: CPT

## 2024-11-26 PROCEDURE — 83540 ASSAY OF IRON: CPT

## 2024-11-26 PROCEDURE — 82550 ASSAY OF CK (CPK): CPT

## 2024-11-26 PROCEDURE — 70498 CT ANGIOGRAPHY NECK: CPT | Mod: MC

## 2024-11-26 PROCEDURE — 84100 ASSAY OF PHOSPHORUS: CPT

## 2024-11-26 PROCEDURE — 83550 IRON BINDING TEST: CPT

## 2024-11-26 PROCEDURE — 99285 EMERGENCY DEPT VISIT HI MDM: CPT | Mod: 25

## 2024-11-26 PROCEDURE — 82728 ASSAY OF FERRITIN: CPT

## 2024-11-26 PROCEDURE — 87086 URINE CULTURE/COLONY COUNT: CPT

## 2024-11-26 PROCEDURE — 85025 COMPLETE CBC W/AUTO DIFF WBC: CPT

## 2024-11-26 PROCEDURE — 97161 PT EVAL LOW COMPLEX 20 MIN: CPT

## 2024-11-26 PROCEDURE — 80048 BASIC METABOLIC PNL TOTAL CA: CPT

## 2024-11-26 PROCEDURE — 70496 CT ANGIOGRAPHY HEAD: CPT | Mod: MC

## 2024-11-26 PROCEDURE — 82746 ASSAY OF FOLIC ACID SERUM: CPT

## 2024-11-26 PROCEDURE — 84443 ASSAY THYROID STIM HORMONE: CPT

## 2024-11-26 PROCEDURE — 96374 THER/PROPH/DIAG INJ IV PUSH: CPT

## 2024-11-26 PROCEDURE — 70450 CT HEAD/BRAIN W/O DYE: CPT | Mod: MC

## 2024-11-26 PROCEDURE — 85027 COMPLETE CBC AUTOMATED: CPT

## 2024-11-26 PROCEDURE — 71045 X-RAY EXAM CHEST 1 VIEW: CPT

## 2024-11-26 PROCEDURE — 84484 ASSAY OF TROPONIN QUANT: CPT

## 2024-11-26 PROCEDURE — 81001 URINALYSIS AUTO W/SCOPE: CPT

## 2025-01-17 ENCOUNTER — INPATIENT (INPATIENT)
Facility: HOSPITAL | Age: 89
LOS: 2 days | Discharge: HOME | DRG: 312 | End: 2025-01-20
Attending: INTERNAL MEDICINE | Admitting: INTERNAL MEDICINE
Payer: MEDICARE

## 2025-01-17 VITALS
DIASTOLIC BLOOD PRESSURE: 82 MMHG | HEART RATE: 68 BPM | RESPIRATION RATE: 18 BRPM | TEMPERATURE: 97 F | OXYGEN SATURATION: 98 % | SYSTOLIC BLOOD PRESSURE: 198 MMHG | WEIGHT: 119.93 LBS

## 2025-01-17 DIAGNOSIS — Z98.49 CATARACT EXTRACTION STATUS, UNSPECIFIED EYE: Chronic | ICD-10-CM

## 2025-01-17 PROCEDURE — 99285 EMERGENCY DEPT VISIT HI MDM: CPT | Mod: GC

## 2025-01-18 DIAGNOSIS — R55 SYNCOPE AND COLLAPSE: ICD-10-CM

## 2025-01-18 DIAGNOSIS — I47.10 SUPRAVENTRICULAR TACHYCARDIA, UNSPECIFIED: ICD-10-CM

## 2025-01-18 DIAGNOSIS — I10 ESSENTIAL (PRIMARY) HYPERTENSION: ICD-10-CM

## 2025-01-18 LAB
ALBUMIN SERPL ELPH-MCNC: 4.3 G/DL — SIGNIFICANT CHANGE UP (ref 3.3–5)
ALP SERPL-CCNC: 60 U/L — SIGNIFICANT CHANGE UP (ref 40–120)
ALT FLD-CCNC: 10 U/L — SIGNIFICANT CHANGE UP (ref 10–45)
ANION GAP SERPL CALC-SCNC: 14 MMOL/L — SIGNIFICANT CHANGE UP (ref 5–17)
APPEARANCE UR: CLEAR — SIGNIFICANT CHANGE UP
APTT BLD: 21.5 SEC — LOW (ref 24.5–35.6)
AST SERPL-CCNC: 18 U/L — SIGNIFICANT CHANGE UP (ref 10–40)
BACTERIA # UR AUTO: NEGATIVE /HPF — SIGNIFICANT CHANGE UP
BASOPHILS # BLD AUTO: 0.03 K/UL — SIGNIFICANT CHANGE UP (ref 0–0.2)
BASOPHILS NFR BLD AUTO: 0.3 % — SIGNIFICANT CHANGE UP (ref 0–2)
BILIRUB SERPL-MCNC: 0.3 MG/DL — SIGNIFICANT CHANGE UP (ref 0.2–1.2)
BILIRUB UR-MCNC: NEGATIVE — SIGNIFICANT CHANGE UP
BUN SERPL-MCNC: 8 MG/DL — SIGNIFICANT CHANGE UP (ref 7–23)
CALCIUM SERPL-MCNC: 9.8 MG/DL — SIGNIFICANT CHANGE UP (ref 8.4–10.5)
CAST: 0 /LPF — SIGNIFICANT CHANGE UP (ref 0–4)
CHLORIDE SERPL-SCNC: 96 MMOL/L — SIGNIFICANT CHANGE UP (ref 96–108)
CHOLEST SERPL-MCNC: 190 MG/DL — SIGNIFICANT CHANGE UP
CO2 SERPL-SCNC: 25 MMOL/L — SIGNIFICANT CHANGE UP (ref 22–31)
COLOR SPEC: YELLOW — SIGNIFICANT CHANGE UP
CREAT SERPL-MCNC: 0.58 MG/DL — SIGNIFICANT CHANGE UP (ref 0.5–1.3)
DIFF PNL FLD: ABNORMAL
EGFR: 85 ML/MIN/1.73M2 — SIGNIFICANT CHANGE UP
EOSINOPHIL # BLD AUTO: 0.05 K/UL — SIGNIFICANT CHANGE UP (ref 0–0.5)
EOSINOPHIL NFR BLD AUTO: 0.5 % — SIGNIFICANT CHANGE UP (ref 0–6)
GAS PNL BLDV: SIGNIFICANT CHANGE UP
GLUCOSE SERPL-MCNC: 93 MG/DL — SIGNIFICANT CHANGE UP (ref 70–99)
GLUCOSE UR QL: NEGATIVE MG/DL — SIGNIFICANT CHANGE UP
HCT VFR BLD CALC: 35.5 % — SIGNIFICANT CHANGE UP (ref 34.5–45)
HDLC SERPL-MCNC: 84 MG/DL — SIGNIFICANT CHANGE UP
HGB BLD-MCNC: 11.5 G/DL — SIGNIFICANT CHANGE UP (ref 11.5–15.5)
IMM GRANULOCYTES NFR BLD AUTO: 0.2 % — SIGNIFICANT CHANGE UP (ref 0–0.9)
INR BLD: 1.02 RATIO — SIGNIFICANT CHANGE UP (ref 0.85–1.16)
KETONES UR-MCNC: NEGATIVE MG/DL — SIGNIFICANT CHANGE UP
LEUKOCYTE ESTERASE UR-ACNC: ABNORMAL
LIPID PNL WITH DIRECT LDL SERPL: 84 MG/DL — SIGNIFICANT CHANGE UP
LYMPHOCYTES # BLD AUTO: 1.92 K/UL — SIGNIFICANT CHANGE UP (ref 1–3.3)
LYMPHOCYTES # BLD AUTO: 20.9 % — SIGNIFICANT CHANGE UP (ref 13–44)
MCHC RBC-ENTMCNC: 27.4 PG — SIGNIFICANT CHANGE UP (ref 27–34)
MCHC RBC-ENTMCNC: 32.4 G/DL — SIGNIFICANT CHANGE UP (ref 32–36)
MCV RBC AUTO: 84.7 FL — SIGNIFICANT CHANGE UP (ref 80–100)
MONOCYTES # BLD AUTO: 0.73 K/UL — SIGNIFICANT CHANGE UP (ref 0–0.9)
MONOCYTES NFR BLD AUTO: 8 % — SIGNIFICANT CHANGE UP (ref 2–14)
NEUTROPHILS # BLD AUTO: 6.42 K/UL — SIGNIFICANT CHANGE UP (ref 1.8–7.4)
NEUTROPHILS NFR BLD AUTO: 70.1 % — SIGNIFICANT CHANGE UP (ref 43–77)
NITRITE UR-MCNC: NEGATIVE — SIGNIFICANT CHANGE UP
NON HDL CHOLESTEROL: 106 MG/DL — SIGNIFICANT CHANGE UP
NRBC # BLD: 0 /100 WBCS — SIGNIFICANT CHANGE UP (ref 0–0)
NRBC BLD-RTO: 0 /100 WBCS — SIGNIFICANT CHANGE UP (ref 0–0)
PH UR: 8 — SIGNIFICANT CHANGE UP (ref 5–8)
PLATELET # BLD AUTO: 328 K/UL — SIGNIFICANT CHANGE UP (ref 150–400)
POTASSIUM SERPL-MCNC: 4.3 MMOL/L — SIGNIFICANT CHANGE UP (ref 3.5–5.3)
POTASSIUM SERPL-SCNC: 4.3 MMOL/L — SIGNIFICANT CHANGE UP (ref 3.5–5.3)
PROT SERPL-MCNC: 7.2 G/DL — SIGNIFICANT CHANGE UP (ref 6–8.3)
PROT UR-MCNC: NEGATIVE MG/DL — SIGNIFICANT CHANGE UP
PROTHROM AB SERPL-ACNC: 11.7 SEC — SIGNIFICANT CHANGE UP (ref 9.9–13.4)
RBC # BLD: 4.19 M/UL — SIGNIFICANT CHANGE UP (ref 3.8–5.2)
RBC # FLD: 14.5 % — SIGNIFICANT CHANGE UP (ref 10.3–14.5)
RBC CASTS # UR COMP ASSIST: 2 /HPF — SIGNIFICANT CHANGE UP (ref 0–4)
SODIUM SERPL-SCNC: 135 MMOL/L — SIGNIFICANT CHANGE UP (ref 135–145)
SP GR SPEC: 1.01 — SIGNIFICANT CHANGE UP (ref 1–1.03)
SQUAMOUS # UR AUTO: 0 /HPF — SIGNIFICANT CHANGE UP (ref 0–5)
TRIGL SERPL-MCNC: 129 MG/DL — SIGNIFICANT CHANGE UP
TROPONIN T, HIGH SENSITIVITY RESULT: 24 NG/L — SIGNIFICANT CHANGE UP (ref 0–51)
TROPONIN T, HIGH SENSITIVITY RESULT: 24 NG/L — SIGNIFICANT CHANGE UP (ref 0–51)
UROBILINOGEN FLD QL: 0.2 MG/DL — SIGNIFICANT CHANGE UP (ref 0.2–1)
WBC # BLD: 9.17 K/UL — SIGNIFICANT CHANGE UP (ref 3.8–10.5)
WBC # FLD AUTO: 9.17 K/UL — SIGNIFICANT CHANGE UP (ref 3.8–10.5)
WBC UR QL: 5 /HPF — SIGNIFICANT CHANGE UP (ref 0–5)

## 2025-01-18 PROCEDURE — 70450 CT HEAD/BRAIN W/O DYE: CPT | Mod: 26,XU

## 2025-01-18 PROCEDURE — 70498 CT ANGIOGRAPHY NECK: CPT | Mod: 26

## 2025-01-18 PROCEDURE — 70496 CT ANGIOGRAPHY HEAD: CPT | Mod: 26

## 2025-01-18 PROCEDURE — 70551 MRI BRAIN STEM W/O DYE: CPT | Mod: 26

## 2025-01-18 PROCEDURE — 99223 1ST HOSP IP/OBS HIGH 75: CPT

## 2025-01-18 PROCEDURE — 99223 1ST HOSP IP/OBS HIGH 75: CPT | Mod: GC

## 2025-01-18 RX ORDER — ALPRAZOLAM 0.5 MG
1 TABLET, EXTENDED RELEASE 24 HR ORAL
Refills: 0 | DISCHARGE

## 2025-01-18 RX ORDER — ALPRAZOLAM 2 MG
0.25 TABLET ORAL AT BEDTIME
Refills: 0 | Status: DISCONTINUED | OUTPATIENT
Start: 2025-01-18 | End: 2025-01-20

## 2025-01-18 RX ORDER — ALPRAZOLAM 2 MG
0.25 TABLET ORAL ONCE
Refills: 0 | Status: DISCONTINUED | OUTPATIENT
Start: 2025-01-18 | End: 2025-01-18

## 2025-01-18 RX ORDER — BACTERIOSTATIC SODIUM CHLORIDE 0.9 %
1 VIAL (ML) INJECTION DAILY
Refills: 0 | Status: DISCONTINUED | OUTPATIENT
Start: 2025-01-18 | End: 2025-01-20

## 2025-01-18 RX ORDER — PANTOPRAZOLE 20 MG/1
40 TABLET, DELAYED RELEASE ORAL
Refills: 0 | Status: DISCONTINUED | OUTPATIENT
Start: 2025-01-18 | End: 2025-01-20

## 2025-01-18 RX ORDER — ROSUVASTATIN CALCIUM 10 MG/1
40 TABLET, FILM COATED ORAL AT BEDTIME
Refills: 0 | Status: DISCONTINUED | OUTPATIENT
Start: 2025-01-18 | End: 2025-01-20

## 2025-01-18 RX ORDER — METOPROLOL SUCCINATE 25 MG
25 TABLET, EXTENDED RELEASE 24 HR ORAL AT BEDTIME
Refills: 0 | Status: DISCONTINUED | OUTPATIENT
Start: 2025-01-18 | End: 2025-01-20

## 2025-01-18 RX ORDER — LOSARTAN POTASSIUM 100 MG
50 TABLET ORAL DAILY
Refills: 0 | Status: DISCONTINUED | OUTPATIENT
Start: 2025-01-19 | End: 2025-01-20

## 2025-01-18 RX ORDER — ASPIRIN 81 MG/1
81 TABLET, COATED ORAL DAILY
Refills: 0 | Status: DISCONTINUED | OUTPATIENT
Start: 2025-01-18 | End: 2025-01-20

## 2025-01-18 RX ORDER — PREDNISONE 5 MG/1
3 TABLET ORAL DAILY
Refills: 0 | Status: DISCONTINUED | OUTPATIENT
Start: 2025-01-18 | End: 2025-01-20

## 2025-01-18 RX ORDER — SERTRALINE HCL 100 MG
25 TABLET ORAL DAILY
Refills: 0 | Status: DISCONTINUED | OUTPATIENT
Start: 2025-01-18 | End: 2025-01-20

## 2025-01-18 RX ORDER — CYANOCOBALAMIN (VITAMIN B-12) 1000MCG/ML
1 VIAL (ML) INJECTION
Refills: 0 | DISCHARGE

## 2025-01-18 RX ADMIN — Medication 0.25 MILLIGRAM(S): at 14:04

## 2025-01-18 RX ADMIN — PANTOPRAZOLE 40 MILLIGRAM(S): 20 TABLET, DELAYED RELEASE ORAL at 17:12

## 2025-01-18 RX ADMIN — Medication 0.25 MILLIGRAM(S): at 21:53

## 2025-01-18 RX ADMIN — Medication 1 GRAM(S): at 12:43

## 2025-01-18 RX ADMIN — ASPIRIN 81 MILLIGRAM(S): 81 TABLET, COATED ORAL at 12:43

## 2025-01-18 RX ADMIN — Medication 25 MILLIGRAM(S): at 21:53

## 2025-01-18 RX ADMIN — PREDNISONE 3 MILLIGRAM(S): 5 TABLET ORAL at 17:12

## 2025-01-18 NOTE — ED ADULT NURSE NOTE - OBJECTIVE STATEMENT
89y F BIB family from home p/w near syncope episode on two separate occasions. Code stroke activated. FS 79. Pt sent to CT, neuro at bedside. Pt is axo4, ambulates with cane at baseline. Pt mentions that she was home when she started to feel dizzy/lightheaded which shortly subside after a few minutes. A while later pt was playing the piano and had a similar episode of dizziness/lightheadedness when the pt fainted, denies any head injury or fall. Family witnessed the event and states that the pt sort of fell over onto the bench. Pt was able to stand from wheelchair to CT scan table with assistance, did complaining of dizziness with mild unsteady gait. Denies vision changes, chest pain, shortness of breath, abdominal pain, nausea, vomiting, diarrhea, fevers, chills, dysuria, hematuria, recent illness travel or fall. Side rails up with bed in lowest position for safety. blanket provided. Comfort and safety provided. 89y F BIB family from home p/w near syncope episode on two separate occasions. Code stroke activated. LKN 1900. FS 79. Pt sent to CT, neuro at bedside. Pt is axo4, ambulates with cane at baseline. Patient states that at 7 PM, patient had episode where she felt like she had hearing loss, unable to speak.  Patient states that she was talking on the phone while sitting down at the time.  Episode lasted for a couple of minutes.  Patient was unable to move at the time.  Patient had a recurrence of the symptoms at around 9 PM.  Family witnessed the event and states that the pt sort of fell over onto the bench while playing piano. Pt was able to stand from wheelchair to CT scan table with assistance, did complaining of dizziness with mild unsteady gait. Denies vision changes, chest pain, shortness of breath, abdominal pain, nausea, vomiting, diarrhea, fevers, chills, dysuria, hematuria, recent illness travel or fall. Side rails up with bed in lowest position for safety. blanket provided. Comfort and safety provided.

## 2025-01-18 NOTE — H&P ADULT - PROBLEM SELECTOR PLAN 1
- unclear etiology, patient w/ admission in October 2024 with similar complaints, found to have at the time: moderate stenosis in the proximal right internal carotid artery and mild to moderate stenosis in the proximal left internal carotid artery, no intervention per inpatient neurology  - appreciate code stroke and neurology's evaluation and recommendations: - Patient with presyncopal episode and associated symptoms likely due to peripheral or cardiac process. No focal neurologic deficits to suggest these were cerebrovascular events. CT head and CTA head/neck, personally reviewed by me, with small vessel ischemic changes and atrophy, L vertebral mild to moderate stenosis, small R ICA saccular aneurysm (incidental), and mild b/l stable V4 stenosis but no other acute intracranial findings, other focal stenosis, occlusion, aneurysm, dissection, or venous sinus thrombosis. No recurrent events and no abnormal movements - No MRI needed at this time, events not consistent with cerebrovascular etiology - Orthostatic vital signs, cardiac telemetry, TTE. Appreciate cardiology input  - will discuss w/ cardiology (primary team) whether TTE required at this time (per last hospitalization documentation plan for O/P TTE, thus likely recently performed for known AS)  - monitor orthostatic vitals  - continue home sodium chloride tablet daily  - fall, seizure precautions  - PT/OT

## 2025-01-18 NOTE — H&P ADULT - NSHPPHYSICALEXAM_GEN_ALL_CORE
Vital Signs Last 24 Hrs  T(F): 98.1 (18 Jan 2025 08:09), Max: 98.1 (18 Jan 2025 03:40)  HR: 67 (18 Jan 2025 08:09) (67 - 72)  BP: 153/74 (18 Jan 2025 08:09) (134/64 - 198/82)  BP(mean): 100 (18 Jan 2025 08:09) (100 - 100)  RR: 19 (18 Jan 2025 08:09) (16 - 19)  SpO2: 98% (18 Jan 2025 08:09) (98% - 99%)  Parameters below as of 18 Jan 2025 08:09  Patient On (Oxygen Delivery Method): room air Vital Signs Last 24 Hrs  T(F): 98.1 (18 Jan 2025 08:09), Max: 98.1 (18 Jan 2025 03:40)  HR: 67 (18 Jan 2025 08:09) (67 - 72)  BP: 153/74 (18 Jan 2025 08:09) (134/64 - 198/82)  BP(mean): 100 (18 Jan 2025 08:09) (100 - 100)  RR: 19 (18 Jan 2025 08:09) (16 - 19)  SpO2: 98% (18 Jan 2025 08:09) (98% - 99%)  Parameters below as of 18 Jan 2025 08:09  Patient On (Oxygen Delivery Method): room air    GEN: elderly woman, sitting up in bed in NAD  PSYCH: A&Ox3, mood and affect appear appropriate   SKIN: intact, no e/o rash  NEURO: no focal neurologic deficits appreciated; CN II-XII intact, sensation intact B/L, motor 5/5 in all mm groups  EYES: PERRL, anicteric, EOMI, no vertical/horizontal nystagmus  HEAD: NC, AT  NECK: supple  RESPI: no accessory muscle use, B/L air entry   CARDIO: regular rate/rhythm, no LE edema B/L  ABD: soft, NT, ND  EXT: patient able to move all extremities spontaneously  VASC: peripheral pulses palpated Vital Signs Last 24 Hrs  T(F): 98.1 (18 Jan 2025 08:09), Max: 98.1 (18 Jan 2025 03:40)  HR: 67 (18 Jan 2025 08:09) (67 - 72)  BP: 153/74 (18 Jan 2025 08:09) (134/64 - 198/82)  BP(mean): 100 (18 Jan 2025 08:09) (100 - 100)  RR: 19 (18 Jan 2025 08:09) (16 - 19)  SpO2: 98% (18 Jan 2025 08:09) (98% - 99%)  Parameters below as of 18 Jan 2025 08:09  Patient On (Oxygen Delivery Method): room air    GEN: elderly woman, sitting up in stretcher in NAD  PSYCH: A&Ox3, mood and affect appear appropriate   SKIN: intact, no e/o rash  NEURO: no focal neurologic deficits appreciated; CN II-XII intact, sensation intact B/L, motor 5/5 in all mm groups  EYES: PERRL, anicteric, EOMI, no vertical/horizontal nystagmus  HEAD: NC, AT  NECK: supple  RESPI: no accessory muscle use, B/L air entry   CARDIO: regular rate/rhythm, no LE edema B/L  ABD: soft, NT, ND  EXT: patient able to move all extremities spontaneously  VASC: peripheral pulses palpated

## 2025-01-18 NOTE — ED PROVIDER NOTE - ATTENDING CONTRIBUTION TO CARE
Nash Khan DO: I have personally performed a face to face medical and diagnostic evaluation of the patient. I have discussed with and reviewed the Resident's and/or ACP's and/or Medical/PA/NP student's note and agree with the History, ROS, Physical Exam and MDM unless otherwise indicated. A brief summary of my personal evaluation and impression can be found below.     HPI above    CONSTITUTIONAL: NAD  SKIN: Warm dry, normal skin turgor  HEAD: NCAT  EYES: EOMI, PERRLA, no scleral icterus, conjunctiva pink  NECK: Supple; non tender. Full ROM.  CARD: RRR  RESP: No respiratory distress  ABD: non-distended, no abdominal tenderness  MSK: Full ROM, no leg swelling  PSYCH: Cooperative, appropriate.    In the setting of patient's recent diarrhea, recent abdominal surgery concern for fluid loss versus electrolyte abnormality given diarrhea.  Patient does have cardiac history but status post ablation with normal vital signs.  With story of questioning presyncope versus TIA we will call code stroke.  CT imaging, will check electrolytes.  Infectious etiology not suspected without any known infectious symptoms.  Cardiac cause possible will check EKG and troponin.  Patient is otherwise well-appearing and symptoms have resolved, dispo pending imaging labs, will keep on cardiac monitor for time being.

## 2025-01-18 NOTE — CONSULT NOTE ADULT - SUBJECTIVE AND OBJECTIVE BOX
FULL NOTE TO FOLLOW    Patient seen and evaluated @   Chief Complaint:     HPI:    ED Presenting Vitals: 97.4F, 68bpm, 198/82 --> 153/74, 18br/m, 98% on RA   ED Course: code stroke was called, neurology consulted  (2025 11:12)    PMH:     Hypertension  Asthma since childhood  Gastroesophageal reflux disease, ? evidence of prior Rodriguez's esophagus  Hyponatremia  Diverticulitis x 3  Temporal arteritis/ PMR now quiescent  SVT S/P ablatioin   Pre-diabetes  Pernicious anemia  BL carpal tunnel syndrome  Gallstones symptomatic on one occasion  Bilateral carotid stenosis - moderate right, mild left  Aortic stenosis, moderate  IgG kappa paraproteinemia  Spinal stenosis  Basal cell carcinoma eyelid, Squamous cell 2020  Mobitz I block when on higher doses of metoprolol and verapamil  History of cataract extraction, unspecified laterality  Vestibulopathy    PSH:     T & A as child  History of cataract extraction, BL   L lower lip Mohs for basal cell   Blepharoplasty OD  Lap cholecystectomy 2024    Medications:   aspirin enteric coated 81 milliGRAM(s) Oral daily  metoprolol succinate ER 25 milliGRAM(s) Oral at bedtime  pantoprazole    Tablet 40 milliGRAM(s) Oral before breakfast  predniSONE   Tablet 3 milliGRAM(s) Oral daily  rosuvastatin 40 milliGRAM(s) Oral at bedtime  sodium chloride 1 Gram(s) Oral daily    Allergies:  adhesives (Other)  latex (Rash)  penicillins (Hives)    FAMILY HISTORY:  Family history of acute myocardial infarction      Social History:  Smoking:  Alcohol:  Drugs:    REVIEW OF SYSTEMS:  CONSTITUTIONAL: No weakness, fevers or chills  EYES/ENT: No visual changes;  No vertigo or throat pain   NECK: No pain or stiffness  RESPIRATORY: No cough, wheezing, hemoptysis; No shortness of breath  CARDIOVASCULAR: No chest pain or palpitations  GASTROINTESTINAL: No abdominal or epigastric pain. No nausea, vomiting, or hematemesis; No diarrhea or constipation. No melena or hematochezia.  GENITOURINARY: No dysuria, frequency or hematuria  NEUROLOGICAL: No numbness or weakness  SKIN: No itching, burning, rashes, or lesions   All other review of systems is negative unless indicated above    Physical Exam:  T(C): 36.7 (25 @ 08:09), Max: 36.7 (25 @ 01:00)  HR: 67 (25 @ 08:09) (67 - 72)  BP: 153/74 (25 @ 08:09) (134/64 - 198/82)  RR: 19 (25 @ 08:09) (16 - 19)  SpO2: 98% (25 @ 08:09) (98% - 99%)  Wt(kg): --    Daily     Daily     Appearance:  Normal, NAD  Eyes:  PERRL, EOMI  HEENT: Normal oral [place], NC/AT  Neck:  No JVD or HJR  Respiratory: Clear to auscultation bilaterally  Cardiovascular: Normal S1 and S2 without murmurs, rubs or gallops  Abdomen:   BS normal, Soft,  Non-tender without organomegally or masses  Extremities: Without edema, pulses are full      Labs:                        11.5   9.17  )-----------( 328      ( 2025 00:09 )             35.5         135  |  96  |  8   ----------------------------<  93  4.3   |  25  |  0.58    Ca    9.8      2025 00:09    TPro  7.2  /  Alb  4.3  /  TBili  0.3  /  DBili  x   /  AST  18  /  ALT  10  /  AlkPhos  60      PT/INR - ( 2025 00:09 )   PT: 11.7 sec;   INR: 1.02 ratio         PTT - ( 2025 00:09 )  PTT:21.5 sec        Total Cholesterol: 190  LDL: --  HDL: 84  T            ECG:    Echo:    Stress Testing:    Cath:    Interpretation of Telemetry:    Imaging:   FULL NOTE TO FOLLOW    Patient seen and evaluated in ER   Chief Complaint: Transient hearing deficit inability to speak, lethargy    HPI:  89 year old white female admitted with episode occurring at 7PM last night, while sitting in a recliner chair talking to a friend felt she was unable to hear associated with headache.  The hearing resolved fairly quickly and then noted inability to speak for a short while.  This was associated with marked fatigue. About one hour later while playing piano, she had recurrence of not feeling well, headache and was very unsteady when she got up to walk using her walker.  Patient went to bed and fell asleep  which was much earlier that her usual time to sleep.  She woke up and had headache.  BP was elevated.  Denied any nausea.  No palpitation.  Patient called me then and advised ER evaluation.    Patient has had vasovagal episodes in past almost always with precipitant of abdominal cramps.  In past with these episodes would become hypotensive and generally during the episode she would have orthostatic lightheadedness.  She differentiates yesterdays episodes as feeling totally different.  As well she did not have nausea which generally were associated with her prior episodes.  Never before did she notice inability to speak.  Although lethargic with yesterday's episodes she did not experience orthostatic lightheadedness.    Initially in ER BP systolic markedly elevated and subsequently only moderately to now mildly elevated.    PMH:     Hypertension  Asthma since childhood  Gastroesophageal reflux disease, ? evidence of prior Rodriguez's esophagus  Hyponatremia  Diverticulitis, multiple episodes  Temporal arteritis/ PMR now quiescent  SVT S/P ablation   Pre-diabetes  Pernicious anemia  BL carpal tunnel syndrome  Gallstones symptomatic on one occasion  Bilateral carotid stenosis - moderate right, mild left  Aortic stenosis, moderate  IgG kappa paraproteinemia  Spinal stenosis  Basal cell carcinoma eyelid, Squamous cell   Mobitz I block when on higher doses of metoprolol and verapamil  History of cataract extraction, unspecified laterality  Vestibulopathy    PSH:     T & A as child  History of cataract extraction, BL   L lower lip Mohs for basal cell 2013  Blepharoplasty OD  Lap cholecystectomy 2024    Medications:   aspirin enteric coated 81 milliGRAM(s) Oral daily  metoprolol succinate ER 25 milliGRAM(s) Oral at bedtime  pantoprazole    Tablet 40 milliGRAM(s) Oral before breakfast  predniSONE   Tablet 3 milliGRAM(s) Oral daily  rosuvastatin 40 milliGRAM(s) Oral at bedtime  sodium chloride 1 Gram(s) Oral daily  Zoloft 25 mg qd  Vitamin D 1000 units daioly  Metamucil daily    Allergies:  adhesives (Other)  latex (Rash)  penicillins (Hives)    FAMILY HISTORY:    Family history of acute myocardial infarction father age 56  HTN 2 sisters  1 sister CHF  1 sister PVD  1 sister bladder ca  2 brothers bladder ca  1 brother PVD      Social History:  Smoking: None    REVIEW OF SYSTEMS:  See HPI  CARDIOVASCULAR: No chest pain, dyspnea edema,  All other review of systems is negative unless indicated above    Physical Exam:  T(C): 36.7 (25 @ 08:09), Max: 36.7 (25 @ 01:00)  HR: 67 (25 @ 08:09) (67 - 72)  BP: 153/74 (25 @ 08:09) (134/64 - 198/82)  RR: 19 (25 @ 08:09) (16 - 19)  SpO2: 98% (25 @ 08:09) (98% - 99%)  Wt(kg): --    Daily     Daily     Appearance:  Normal, NAD  Eyes:   EOMI  HEENT: Normal oral mucosa NC/AT  Neck:  No JVD  Respiratory: Clear to auscultation bilaterally  Cardiovascular: S1 obscured by 1/6 GREGORIO base and LSB.  S2 single.  No rubs or gallops  Abdomen:   BS normal, Soft,  Non-tender without organomegaly or masses  Extremities: Without edema      Labs:                        11.5   9.17  )-----------( 328      ( 2025 00:09 )             35.5         135  |  96  |  8   ----------------------------<  93  4.3   |  25  |  0.58    Ca    9.8      2025 00:09    TPro  7.2  /  Alb  4.3  /  TBili  0.3  /  DBili  x   /  AST  18  /  ALT  10  /  AlkPhos  60      PT/INR - ( 2025 00:09 )   PT: 11.7 sec;   INR: 1.02 ratio         PTT - ( 2025 00:09 )  PTT:21.5 sec    Delta Trop 24/24        Total Cholesterol: 190  LDL: 84  HDL: 84  T    ACC: [id number] EXAM:  CT ANGIO BRAIN STROKE [facility]   ORDERED BY:  QUE ARUAZ     PROCEDURE DATE:  2025          INTERPRETATION:  CLINICAL INFORMATION: Stroke Code. Transient dysarthria.    COMPARISON: CTA head and neck 10/22/2024    CONTRAST:  IV Contrast: Omnipaque 300  70 cc administered  30 cc discarded  .    TECHNIQUE:    CTA NECK/HEAD: After the intravenous power injection of non-ionic   contrast material, serial thin sections were obtained through the   cervical and intracranial circulation on a multislice CT scanner. Axial,   Coronal and Sagittal MIP reformatted images were obtained. 3D   reconstruction was also performed.    FINDINGS:    CTA NECK:    AORTIC ARCH AND VISUALIZED GREAT VESSELS: Normal three-vessel arch. Mild   aortic calcification.    RIGHT:  COMMON CAROTID ARTERY: Mild plaque without significant stenosis.  INTERNAL CAROTID ARTERY: Calcified and noncalcified plaque producing   moderate stenosis carotid bifurcation, measuring approximately 60% by   NASCET criteria.  VERTEBRAL ARTERY: Normal in course and caliber to the intracranial   circulation.    LEFT:  COMMON CAROTID ARTERY: Mild calcification of the carotid bifurcation. No   significant stenosis to the carotid bifurcation.  INTERNAL CAROTID ARTERY: No significant stenosis based on NASCET criteria.  VERTEBRAL ARTERY: Mild to moderate stenosis of the V1 origin. Remainder   of the vertebral artery is normal in course andcaliber to the   intracranial circulation.    VISUALIZED LUNGS: Clear.    MISCELLANEOUS: Degenerative changes of the spine. A 9 mm calcified focus   in the left submandibular gland, unchanged.    CTA HEAD:    INTERNAL CAROTID ARTERIES: Mild calcification of the bilateral cavernous   and supraclinoid segments. Suggestion of a 3 mm laterally oriented   aneurysm of the right cavernous segment, unchanged.    [place]: Noaneurysm identified. Tiny aneurysms can be beyond   the resolution of CTA technique.    ANTERIOR CEREBRAL ARTERIES: No significant stenosis or occlusion.  MIDDLE CEREBRAL ARTERIES: No significant stenosis or occlusion.  POSTERIOR CEREBRAL ARTERIES: No significant stenosis or occlusion.    DISTAL VERTEBRAL / BASILAR ARTERIES: Mild calcification contributes to   mild stenosis of the bilateral proximal V4 segments. No significant   stenosis or occlusion.    VENOUS STRUCTURES: Visualized deep venous structures appear patent.    MISCELLANEOUS: No other vascular abnormality is seen.      IMPRESSION:    CTA NECK:  No large vessel occlusion or dissection.    Moderate stable stenosis at the origin of the right cervical ICA,   measuring approximately 60% by NASCET criteria.    Mild to moderate stable stenosis at the origin of the left vertebral   artery.    CTA HEAD:  No proximal large vessel occlusion. MRI brain recommended if symptoms   persist.    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    Mild bilateral stable V4 stenosis.

## 2025-01-18 NOTE — H&P ADULT - NEGATIVE MUSCULOSKELETAL SYMPTOMS
no myalgia/no stiffness/no neck pain/no back pain/no leg pain L/no leg pain R Tanna Cannon for attending Dr. Barclay:  Spoke to pt son, questions answered. Tanna Cannon for attending Dr. Barclay:  Pt condition improved. Pt able to speak but is not making sondra, is confused. States she did not take anything including marijuana. Tanna Cannon for attending Dr. Barclay:  Pt now states that she took isopropyl alcohol. Spoke to toxicology fellow. Recommended continuing iv fluids, bmp, serum osmolarity, serum alcohol level. Tirso VILLANUEVA Spoke to  and given results. States patient has had ingestions in past, has been battling alcohol for a while. He has been trying to get her into detox. Tirso VILLANUEVA Spoke to toxicology fellow. Recommends thiamine 500mg every eight hours and Folic acid 1mg/kg every six hours. Also requests another abg now. Dr. Onofre informed. Tirso VILLANUEVA

## 2025-01-18 NOTE — CONSULT NOTE ADULT - ASSESSMENT
GEORGE GUTIERREZ is a 89y (21-Dec-1932) woman with a PMHx significant for paroxysmal supraventricular tachycardia status post ablation, hypertension, moderate aortic stenosis, GERD, IBS who presents to the emergency room as a code stroke after having several episodes of near syncope.  Code stroke was called in triage as patient had questionable periods of aphasia during the episodes.  Neurology promptly at bedside to evaluate the patient.    NIHSS 0  MRS 1  LKW 1/17 1900    ABCD2 3    Impression  Syncopal like episodes with hearing loss and subjective aphasia that has resolved of unknown etiology. Differential wide with cardiac vs toxic metabolic vs TIA/AIS etiology, pending further workup     Recommendations  [] MR Brain w/o contrast  [] Consider VEEG to rule out nonconvulsive seizures  [] Further syncopal workup as per primary team  [] Toxic Metabolic labs   [] Orthostatic Vitals    Case discussed with Stroke Fellow. To be seen in the am on rounds. Pending chart attestation   GEORGE GUTIERREZ is a 89y (21-Dec-1932) woman with a PMHx significant for paroxysmal supraventricular tachycardia status post ablation, hypertension, moderate aortic stenosis, GERD, IBS who presents to the emergency room as a code stroke after having several episodes of near syncope.  Code stroke was called in triage as patient had questionable periods of aphasia during the episodes.  Neurology promptly at bedside to evaluate the patient.    NIHSS 0  MRS 1  LKW 1/17 1900    ABCD2 3    Impression  Syncopal like episodes with hearing loss and subjective aphasia that has resolved of unknown etiology. Differential wide with cardiac vs toxic metabolic vs TIA/AIS etiology, pending further workup     Recommendations  [] No MR Brain needed  [] Consider VEEG to rule out nonconvulsive seizures  [] Further syncopal workup as per primary team  [] Toxic Metabolic labs   [] Orthostatic Vitals    Case discussed with Stroke Fellow. To be seen in the am on rounds. Pending chart attestation

## 2025-01-18 NOTE — ED ADULT NURSE NOTE - NSFALLHARMRISKINTERV_ED_ALL_ED

## 2025-01-18 NOTE — H&P ADULT - HISTORY OF PRESENT ILLNESS
ED Presenting Vitals: 97.4F, 68bpm, 198/82 --> 153/74, 18br/m, 98% on RA   ED Course: code stroke was called, neurology consulted  89yoF w/ PMHx of paroxysmal SVT s/p ablation, HTN, moderate AS, PMR, GERD, IBS, who presents with complaints of multiple episode of near-syncope. Patient states that at 7PM last night, patient had an episode where she felt like she had bilateral hearing loss and then becoming unable to speak while on the phone with her friend. This episode lasted for a couple of minutes and she was unable to move (while she was sitting down) during the time. She then had a recurrence of these symptoms again at around 9PM while playing the piano. In between episodes, patient notes she feels generally weak. Of note, patient was hospitalized in October 2024 with similar complaints, found to have at the time: moderate stenosis in the proximal right internal carotid artery and mild to moderate stenosis in the proximal left internal carotid artery, no intervention per inpatient neurology, along with mild orthostasis. Patient denies current, recent, or keya-event CP, palpitations, SOB/TAPIA, LE edema, vision changes, fever/chills, sick contacts, cough, abd pain, n/v/d/c, nor urinary complaints.     ED Presenting Vitals: 97.4F, 68bpm, 198/82 --> 153/74, 18br/m, 98% on RA   ED Course: code stroke was called, neurology consulted

## 2025-01-18 NOTE — CONSULT NOTE ADULT - ATTENDING COMMENTS
HPI as per resident note, personally verified by me. Patient's history corroborated by son, present at bedside. Patient has a history of presyncopal episodes in the setting of abdominal discomfort. She was in her usual state of health until 1/17 when she was on the phone with her friend and noted b/l hearing loss, decreased ability to speah, and generalized weakness like she felt "depleted". Episode lasted minutes and then self resolved. She had a similar episode on later in the evening while playing the piano and she had to sit down. She came to the ED and had one more similar episode but then none since. At no time did she have any focal neurologic deficits, abnormal movements, vision changes, speech changes, or incoordination. No reported abdominal pain either. Of note, she follows with outpatient neurology Dr. Campos (and pending appointment with Dr. Ceballos) of [facility] who did routine EEG in office that was unrevealing.    Neurologic exam as per resident note with additions as below:  AAO x3, speech fluent  CN's II-XII intact, b/l surgical pupils with lens implantation but pupils reactive b/l  Strength 5/5 all. No pronator drift  Sens intact all  FtN intact b/l  Downgoing b/l plantar response    UA (-)    < from: CT Angio Neck Stroke Protocol w/ IV Cont (01.18.25 @ 00:12) >    VENOUS STRUCTURES: Visualized deep venous structures appear patent.    IMPRESSION:    CTA NECK:  No large vessel occlusion or dissection.    Moderate stable stenosis at the origin of the right cervical ICA,   measuring approximately 60% by NASCET criteria.    Mild to moderate stable stenosis at the origin of the left vertebral   artery.    CTA HEAD:  No proximal large vessel occlusion. MRI brain recommended if symptoms   persist.    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    Mild bilateral stable V4 stenosis.    < end of copied text >      A/P:  Presyncope  B/l hearing loss  Speech disturbance  Weakness  HTN  R ICA aneurysm    - Patient with presyncopal episode and associated symptoms likely due to peripheral or cardiac process. No focal neurologic deficits to suggest these were cerebrovascular events. CT head and CTA head/neck, personally reviewed by me, with small vessel ischemic changes and atrophy, L vertebral mild to moderate stenosis, small R ICA saccular aneurysm (incidental), and mild b/l stable V4 stenosis but no other acute intracranial findings, other focal stenosis, occlusion, aneurysm, dissection, or venous sinus thrombosis. No recurrent events and no abnormal movements  - No MRI needed at this time, events not consistent with cerebrovascular etiology  - Orthostatic vital signs, cardiac telemetry, TTE. Appreciate cardiology input  - Check labs for additional causes with RVP  - Continue to address above medical problems, as you are doing  - Will sign off, please call [phone]) with additional questions or concerns. Patient is a prior patient of Dr. Campos and a current patient of Dr. Ceballos of [facility]. If additional neurologic input is desired during this admission recommend reaching out to them ([phone]) as they evaluate their own patient at [facility]

## 2025-01-18 NOTE — ED PROVIDER NOTE - CLINICAL SUMMARY MEDICAL DECISION MAKING FREE TEXT BOX
89-year-old female with past medical history of paroxysmal SVT status post ablation, hypertension, PMR, moderate AS, GERD, IBS, presenting to the emergency room multiple episodes of near syncope.  Vitals nonactionable.  Physical exam with heart regular rate and rhythm, lungs clear to auscultation, abdomen soft nondistended nontender.  Patient neuroexam benign.  Given change in verbal status, code stroke called and performed.  Also concern for possible cardiac syncope, dehydration, electrolyte derangement.  Will get labs including CBC, CMP, coags, troponin, VBG, UA/UC, CT, CTA of the head and neck.  Neuro also consulted. 89-year-old female with past medical history of paroxysmal SVT status post ablation, hypertension, PMR, moderate AS, GERD, IBS, presenting to the emergency room multiple episodes of near syncope.  Vitals nonactionable.  Physical exam with heart regular rate and rhythm, lungs clear to auscultation, abdomen soft nondistended nontender.  Patient neuroexam benign.  Given change in verbal status, code stroke called and performed.  Also concern for possible cardiac syncope, dehydration, electrolyte derangement.  Will get labs including CBC, CMP, coags, troponin, VBG, UA/UC, CT, CTA of the head and neck.  Neuro also consulted. History of presyncope concern for discharging patient home as they live by themselves, probable admission.

## 2025-01-18 NOTE — H&P ADULT - PROBLEM SELECTOR PLAN 3
- continue home Losartan with hold parameters  - continue home daily Prednisone -- unclear indication (?likely PMR)

## 2025-01-18 NOTE — ED ADULT NURSE REASSESSMENT NOTE - NS ED NURSE REASSESS COMMENT FT1
pt ambulated to restroom one with person assist and cane used w/out any difficulty. Steady gait noted.

## 2025-01-18 NOTE — H&P ADULT - PROBLEM SELECTOR PLAN 2
- continue telemetry monitoring  - continue home Metoprolol w/ hold parameters  - continue home ASA, statin

## 2025-01-18 NOTE — H&P ADULT - NSHPLABSRESULTS_GEN_ALL_CORE
Labs and imaging obtained personally reviewed.                        11.   9.17  )-----------( 328      ( 2025 00:09 )             35.5       135  |  96  |  8   ----------------------------<  93  4.3   |  25  |  0.58    Ca    9.8      2025 00:09    TPro  7.2  /  Alb  4.3  /  TBili  0.3  /  DBili  x   /  AST  18  /  ALT  10  /  AlkPhos  60      PT/INR - ( 2025 00:09 )   PT: 11.7 sec;   INR: 1.02 ratio    PTT - ( 2025 00:09 )  PTT:21.5 sec    hsTrop = 24 --> 24    Urinalysis Basic - ( 2025 01:06 )  Color: Yellow / Appearance: Clear / S.012 / pH: x  Gluc: x / Ketone: Negative mg/dL  / Bili: Negative / Urobili: 0.2 mg/dL   Blood: x / Protein: Negative mg/dL / Nitrite: Negative   Leuk Esterase: Trace / RBC: 2 /HPF / WBC 5 /HPF   Sq Epi: x / Non Sq Epi: 0 /HPF / Bacteria: Negative /HPF    CTH Stroke Protocol as reported: No acute intracranial hemorrhage, mass effect, or midline shift.    CTA Head w/ IV contrast as reported: No proximal large vessel occlusion. MRI brain recommended if symptoms persist. 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 Mild bilateral stable V4 stenosis.    CTA Neck w/ IV contrast as reported: No large vessel occlusion or dissection. Moderate stable stenosis at the origin of the right cervical ICA, measuring approximately 60% by NASCET criteria. Mild to moderate stable stenosis at the origin of the left vertebral artery.

## 2025-01-18 NOTE — CONSULT NOTE ADULT - ASSESSMENT
Impression:  Unclear etiology of 2 transient episodes yesterday of change in sensorium, one with some transient hearing change and shortly thereafter shortlived inability to speak.  Both episodes occurred while seated.  Associated fatigue and headache.  Wobbliness upon walking when got up.  BP elevated at home.  No evidence of hypotension inER or bradyarrhythmia.    R/O TIA, transient rhythm abnormality, orthostasis.    Plan:  MRI head done results pending.             Check orthostatics.              Observe on tele.

## 2025-01-18 NOTE — ED PROVIDER NOTE - OBJECTIVE STATEMENT
89-year-old female with past medical history of paroxysmal SVT status post ablation, hypertension, PMR, moderate AS, GERD, IBS, presenting to the emergency room multiple episodes of near syncope.  Patient states that at 7 PM, patient had episode where she felt like she had hearing loss, unable to speak.  Patient states that she was talking on the phone while sitting down at the time.  Episode lasted for a couple of minutes.  Patient was unable to move at the time.  Patient had a recurrence of the symptoms at around 9 PM.  Patient states that she is pregnant panel at the time.  Patient denies hitting her head.  Denies chest pain, shortness of breath, nausea, vomiting, recent illness, abdominal pain, diarrhea.  Of note patient was hospitalized on October 2024 for also similar near syncope episodes.

## 2025-01-18 NOTE — H&P ADULT - TIME BILLING
conducting history and physical examination, reviewing and ordering diagnostic workup as above, discussing with consultants, determining acute diagnoses / plan for continued monitoring and management, and communication with patient.

## 2025-01-18 NOTE — ED PROVIDER NOTE - PROGRESS NOTE DETAILS
MD Jackie (PGY-3) Patient with near syncopal episode in ED. Repeat EKG performed. No acute changes. Plan for admission for further work up. MD Jackie (PGY-3) patient's labs and imaging reviewed.  No acute findings neuro recommending MRI and possible video EEG.  Plan for admission.

## 2025-01-18 NOTE — PATIENT PROFILE ADULT - NSPROEXTENSIONSOFSELF_GEN_A_NUR
dentures/hearing aid

## 2025-01-18 NOTE — CONSULT NOTE ADULT - SUBJECTIVE AND OBJECTIVE BOX
Neurology - Consult Note    -  Spectra: [id number] ([facility]), [id number] ([facility])  -    HPI:   GEORGE GUTIERREZ is a 89y (21-Dec-1932) woman with a PMHx significant for paroxysmal supraventricular tachycardia status post ablation, hypertension, moderate aortic stenosis, GERD, IBS who presents to the emergency room as a code stroke after having several episodes of near syncope.  Code stroke was called in triage as patient had questionable periods of aphasia during the episodes.  Neurology promptly at bedside to evaluate the patient.    Upon interview, patient is alert and oriented x 3, answering all questions appropriately and participating in the interview.  Patient's last known well was around 1900 when she was on the phone with her friend and experienced an episode of bilateral hearing loss and weakness and felt like she was unable to speak on the phone.  This lasted complete minutes and resolved back to baseline.  Then, around 9 PM she was playing the piano and had a similar episode where she also felt generalized weakness and had to sit down.  Patient states that in between episodes she feels generally weak.  Of note, patient had another near syncopal episode in the emergency room.  CT imaging was reviewed and did not show any acute intracranial pathology.  Patient was hospitalized in October for similar near syncopal episodes.  At that time, patient was seen by Dr. Monson who stated these episodes had no features to suggest seizure like activity and patient was discharged home.    NIHSS 0  MRS 1  LKW 1/17 1900    Review of Systems:     CONSTITUTIONAL: No fevers or chills  EYES AND ENT: No visual changes or no throat pain   NECK: No pain or stiffness  RESPIRATORY: No hemoptysis or shortness of breath  CARDIOVASCULAR: No chest pain or palpitations  GASTROINTESTINAL: No melena or hematochezia  GENITOURINARY: No dysuria or hematuria  NEUROLOGICAL: +As stated in HPI above  SKIN: No itching, burning, rashes, or lesions   All other review of systems is negative unless indicated above.    Allergies:  adhesives (Other)  latex (Rash)  penicillins (Hives)      PMHx/PSHx/Family Hx: As above, otherwise see below   Hypertension    Gastroesophageal reflux disease, esophagitis presence not specified    Hypertension    History of polymyalgia rheumatica    Paroxysmal SVT (supraventricular tachycardia)    IBS (irritable bowel syndrome)        Social Hx:  No current use of tobacco, alcohol, or illicit drugs    Medications:  MEDICATIONS  (STANDING):    MEDICATIONS  (PRN):      Vitals:  T(C): 36.7 (01-18-25 @ 01:00), Max: 36.7 (01-18-25 @ 01:00)  HR: 70 (01-18-25 @ 01:00) (68 - 70)  BP: 163/78 (01-18-25 @ 01:00) (163/78 - 198/82)  RR: 16 (01-18-25 @ 01:00) (16 - 18)  SpO2: 98% (01-18-25 @ 01:00) (98% - 98%)    Physical Examination:     Constitutional: well-developed, well-nourished, well-groomed  Cardiovascular: no swelling, warm-to-touch    Neurological Examination:    - Mental Status: Eyes open, attends to examiner; oriented to person, age, month, year, location, and situation; speech is fluent with intact naming, intact repetition, and follows 1-step commands;    - Cranial Nerves:  II: Visual fields are full to confrontation; pupils are equal, round, and reactive to light   III, IV, VI: Extraocular movements are intact without nystagmus  V: Facial sensation is intact in the V1-V3 distribution bilaterally  VII: Face is symmetric with normal eye closure and smile  VIII: Hearing is intact to conversation  IX, X: Uvula is midline and soft palate rises symmetrically  XI: Shoulder shrug intact  XII: Tongue protrudes in the midline    - Motor/Strength Testing:  - No drifts x 4 extremities.                                   Right           Left  Deltoid                     5                 5  Biceps                      5                 5  Triceps                     5                 5  Wrist Ext (radial)       5                 5  Hand                  5                 5    Hip Flex                   5                  5  Knee Ext	      5                  5  Dorsiflex                  5                  5  Plantarflex               5                  5    - Normal muscle bulk and tone throughout.    - Reflexes:   Bicep (C5/C6):                  R 2+ --- L 2+   Brachioradialis (C5/C6) :   R 2+ --- L 2+   Patella (L3/L4) :                 R 2+ --- L 2+   Ankle (S1) :                       R 2+ --- L 2+     - Sensory: Intact throughout to light touch x 4 extremities.  - Coordination: Mild difficulty with finding examiner's finger however pt has several eye conditions and poor acuity   - Gait: Unable to assess due to patients safety    Labs:                        11.5   9.17  )-----------( 328      ( 18 Jan 2025 00:09 )             35.5     01-18    135  |  96  |  8   ----------------------------<  93  4.3   |  25  |  0.58    Ca    9.8      18 Jan 2025 00:09    TPro  7.2  /  Alb  4.3  /  TBili  0.3  /  DBili  x   /  AST  18  /  ALT  10  /  AlkPhos  60  01-18    CAPILLARY BLOOD GLUCOSE  79 (18 Jan 2025 03:39)      POCT Blood Glucose.: 79 mg/dL (17 Jan 2025 23:51)    LIVER FUNCTIONS - ( 18 Jan 2025 00:09 )  Alb: 4.3 g/dL / Pro: 7.2 g/dL / ALK PHOS: 60 U/L / ALT: 10 U/L / AST: 18 U/L / GGT: x             PT/INR - ( 18 Jan 2025 00:09 )   PT: 11.7 sec;   INR: 1.02 ratio         PTT - ( 18 Jan 2025 00:09 )  PTT:21.5 sec  CSF:                  Radiology:  CT Head   IMPRESSION:  No acute intracranial hemorrhage, mass effect, or midline shift.    CTA NECK:  No large vessel occlusion or dissection.    Moderate stable stenosis at the origin of the right cervical ICA,   measuring approximately 60% by NASCET criteria.    Mild to moderate stable stenosis at the origin of the left vertebral   artery.    CTA HEAD:  No proximal large vessel occlusion. MRI brain recommended if symptoms   persist.    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    Mild bilateral stable V4 stenosis.

## 2025-01-18 NOTE — H&P ADULT - ASSESSMENT
89yoF w/ PMHx of paroxysmal SVT s/p ablation, HTN, moderate AS, PMR, GERD, IBS, who presents with complaints of multiple episode of near-syncope. Patient states that at 7PM last night, patient had an episode where she felt like she had bilateral hearing loss and then becoming unable to speak while on the phone with her friend. This episode lasted for a couple of minutes and she was unable to move (while she was sitting down) during the time. She then had a recurrence of these symptoms again at around 9PM while playing the piano. In between episodes, patient notes she feels generally weak.

## 2025-01-18 NOTE — PATIENT PROFILE ADULT - FALL HARM RISK - HARM RISK INTERVENTIONS

## 2025-01-18 NOTE — PATIENT PROFILE ADULT - FALL HARM RISK - TYPE OF ASSESSMENT
For your diabetes management:  - START weekly Ozempic 2 mg subcutaneous injections  - STOP the Glipizide twice a day  - Repeat A1C testing in three months   Admission

## 2025-01-18 NOTE — ED ADULT NURSE REASSESSMENT NOTE - NS ED NURSE REASSESS COMMENT FT1
report received from TREASURE dupree. pt is A&ox4, speech is clear, following commands, gross neuro intact. pt awaiting inpatient bed assignment. denies cp, sob no acute distress noted. IV c/d/i. comfort and safety measures maintained.

## 2025-01-19 LAB
CULTURE RESULTS: SIGNIFICANT CHANGE UP
SPECIMEN SOURCE: SIGNIFICANT CHANGE UP

## 2025-01-19 RX ADMIN — Medication 25 MILLIGRAM(S): at 22:12

## 2025-01-19 RX ADMIN — ROSUVASTATIN CALCIUM 40 MILLIGRAM(S): 10 TABLET, FILM COATED ORAL at 05:36

## 2025-01-19 RX ADMIN — Medication 1 GRAM(S): at 12:16

## 2025-01-19 RX ADMIN — Medication 0.25 MILLIGRAM(S): at 22:11

## 2025-01-19 RX ADMIN — PANTOPRAZOLE 40 MILLIGRAM(S): 20 TABLET, DELAYED RELEASE ORAL at 05:37

## 2025-01-19 RX ADMIN — Medication 50 MILLIGRAM(S): at 05:39

## 2025-01-19 RX ADMIN — PREDNISONE 3 MILLIGRAM(S): 5 TABLET ORAL at 05:36

## 2025-01-19 RX ADMIN — ASPIRIN 81 MILLIGRAM(S): 81 TABLET, COATED ORAL at 12:15

## 2025-01-19 NOTE — PHYSICAL THERAPY INITIAL EVALUATION ADULT - PREDICTED DURATION OF THERAPY (DAYS/WKS), PT EVAL
2-3 weeks; pt is clear for discharge from PT standpoint will place on amb aide program while in hospital

## 2025-01-19 NOTE — PHYSICAL THERAPY INITIAL EVALUATION ADULT - ADDITIONAL COMMENTS
lives alone , used std cane or rolling walker PTA ? , wears hearing aids   HCP Vianey Anthony pt dtr [phone] HCP no caregiver id lives alone in house with 5 steps to enter with b handrails and flight of steps to bedroom level with b handrails ; pt has a tri wheeled walker on main level of home if need and regular rolling walker on bedroom level ; pt uses std cane mostly but has rolling walker in reach if need per pt  , pt uses std cane outdoors as well ; pt , wears hearing aids , drives ,. independent in adls   HCP Vianey Anthony pt dtr [phone] HCP no caregiver id; pt has 5 children her dtr lives 30 min away V[place] , youngest son lives a few blocks away and assist her if needed

## 2025-01-19 NOTE — PROGRESS NOTE ADULT - ASSESSMENT
Impression:  Symptoms resolved now without recurrence, unclear etiology.  No demonstration of hypotension or orthostasis.  ? TIA                       No evident arrythmia to account for event.    Plan: Observe on tele on more day.    Tentative D/C tomorrow.

## 2025-01-19 NOTE — PHYSICAL THERAPY INITIAL EVALUATION ADULT - REFERRING PHYSICIAN, REHAB EVAL
Isaac Figueroa MD ORDER PT EVAl and TREAT , bedrest order Isaac Figueroa MD ORDER PT EVAl and TREAT , bedrest order.. PT spoke with AYLA Lamas via TEAMS ptt can be eval by PT and amb as tolerated

## 2025-01-19 NOTE — PHYSICAL THERAPY INITIAL EVALUATION ADULT - BED MOBILITY LIMITATIONS, REHAB EVAL
pt independent with bed mobs and even wait with change of position per pt due to history in past of getting lightheaded or dizzy when change position , currently feels ok good balance seated , pt kendra to  doff and don non skid red sock independently

## 2025-01-19 NOTE — PHYSICAL THERAPY INITIAL EVALUATION ADULT - PATIENT/FAMILY AGREES WITH PLAN
Outpt PT (pt was going up until june of 2024 for balance issues , had ablation then later 2024 had gall bladder out as well and never resumed but feels she should/yes

## 2025-01-19 NOTE — PHYSICAL THERAPY INITIAL EVALUATION ADULT - GAIT TRAINING, PT EVAL
GOAL: pt will amb with rolling walker 400 ft x 3 in 2 weeks steady gait independent , work on std cane amb with supervision when able to tolerate

## 2025-01-19 NOTE — PHYSICAL THERAPY INITIAL EVALUATION ADULT - GENERAL OBSERVATIONS, REHAB EVAL
pt received in bed nad top rails up and 1 siderail HOB 30 degrees call bell,phone and tbale in reach bed in lowest position bed alarm active wheels locked , pt w/o cc's feels okay no further episode of hearing or dysarthria issues

## 2025-01-19 NOTE — PHYSICAL THERAPY INITIAL EVALUATION ADULT - GAIT DISTANCE, PT EVAL
pt amb 125 ft with std cane very cautious gait fair balance slow sukhwinder decrease step length , pt then amb with rolling walker 125 ft steady gait good sukhwinder independent fair plus balance  pt understood to use rolling walker to amb for safe amb

## 2025-01-19 NOTE — PHYSICAL THERAPY INITIAL EVALUATION ADULT - PERTINENT HX OF CURRENT PROBLEM, REHAB EVAL
1/18/25 CTA NECK: No large vessel occlusion or dissection. Moderate stable stenosis at the origin of the right cervical ICA, measuring approximately 60% by NASCET criteria. Mild to moderate stable stenosis at the origin of the left vertebral artery.  1/18/25 CTA HEAD: No proximal large vessel occlusion. MRI brain recommended if symptoms persist. 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 Mild bilateral stable V4 stenosis.  1/18/25 MR HEAD: no acute infarction 89yoF w/ PMHx of paroxysmal SVT s/p ablation, HTN, moderate AS, PMR, GERD, IBS, who presents with complaints of multiple episode of near-syncope. Patient states that at 7PM last night, patient had an episode where she felt like she had bilateral hearing loss and then becoming unable to speak while on the phone with her friend. This episode lasted for a couple of minutes and she was unable to move (while she was sitting down) during the time. She then had a recurrence of these symptoms again at around 9PM while playing the piano. In between episodes, patient notes she feels generally weak. Of note, patient was hospitalized in October 2024 with similar complaints, found to have at the time: moderate stenosis in the proximal right internal carotid artery and mild to moderate stenosis in the proximal left internal carotid artery, no intervention per inpatient neurology, along with mild orthostasis. Patient denies current, recent, or keya-event CP, palpitations, SOB/TAPIA, LE edema, vision changes, fever/chills, sick contacts, cough, abd pain, n/v/d/c, nor urinary complaints.   note : h/o vasovagal episodes as above precipitated w/ abd cramps , hypotensive   ED Presenting Vitals: 97.4F, 68bpm, 198/82 --> 153/74, 18br/m, 98% on RA   ED Course: code stroke was called, neurology consulted   1/18/25 CTA NECK: No large vessel occlusion or dissection. Moderate stable stenosis at the origin of the right cervical ICA, measuring approximately 60% by NASCET criteria. Mild to moderate stable stenosis at the origin of the left vertebral artery.  1/18/25 CTA HEAD: No proximal large vessel occlusion. MRI brain recommended if symptoms persist. 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 Mild bilateral stable V4 stenosis.  1/18/25 MR HEAD: no acute infarction  tele ist degree AVB lead II

## 2025-01-19 NOTE — OCCUPATIONAL THERAPY INITIAL EVALUATION ADULT - UPPER BODY DRESSING, PREVIOUS LEVEL OF FUNCTION, OT EVAL
Genetics New Patient Intake Form   Patient Details   Tameka Ramirez     1948     [id number]     [facility] and [facility]   [facility]
independent

## 2025-01-19 NOTE — OCCUPATIONAL THERAPY INITIAL EVALUATION ADULT - PERTINENT HX OF CURRENT PROBLEM, REHAB EVAL
89F w/ PMHx of paroxysmal SVT s/p ablation, HTN, moderate AS, PMR, GERD, IBS, who presents with complaints of multiple episode of near-syncope. Patient states that at 7PM last night, patient had an episode where she felt like she had bilateral hearing loss and then becoming unable to speak while on the phone with her friend. This episode lasted for a couple of minutes and she was unable to move (while she was sitting down) during the time. She then had a recurrence of these symptoms again at around 9PM while playing the piano. In between episodes, patient notes she feels generally weak. Of note, patient was hospitalized in October 2024 with similar complaints, found to have at the time: moderate stenosis in the proximal right internal carotid artery and mild to moderate stenosis in the proximal left internal carotid artery, no intervention per inpatient neurology, along with mild orthostasis. Patient denies current, recent, or keya-event CP, palpitations, SOB/TAPIA, LE edema, vision changes, fever/chills, sick contacts, cough, abd pain, n/v/d/c, nor urinary complaints. IMAGING: MRI HEAD: (-). CTA HEAD: No proximal large vessel occlusion. MRI brain recommended if symptoms persist. 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 Mild bilateral stable V4 stenosis.

## 2025-01-19 NOTE — OCCUPATIONAL THERAPY INITIAL EVALUATION ADULT - LIVES WITH, PROFILE
Pt lives alone in  [address] and 1 flight to bedroom. Pt reports independence with all aspects of self care and functional mobility with cane.

## 2025-01-19 NOTE — PHYSICAL THERAPY INITIAL EVALUATION ADULT - GAIT DEVIATIONS NOTED, PT EVAL
decreased sukhwinder/increased time in double stance/decreased step length/decreased stride length/decreased swing-to-stance ratio/decreased weight-shifting ability

## 2025-01-19 NOTE — PHYSICAL THERAPY INITIAL EVALUATION ADULT - ADL SKILLS, REHAB EVAL
has 2 grab bars in shower PT recommend shower seat or bench and pt knows how and where to obtain for options as ins does not cover/independent/needs device

## 2025-01-19 NOTE — PHYSICAL THERAPY INITIAL EVALUATION ADULT - IMPAIRED TRANSFERS: SIT/STAND, REHAB EVAL
decrease endurance , sit -stand at std cane supervision pt compensates w/ mild unstediness by bracing le's against bed to maintain balance stood xfew min at std cane ( at rolling walker independent no lob fair balance ) pt understood to sit-stand to and from rolling walker )/impaired balance/impaired postural control/decreased sensation/impaired sensory feedback

## 2025-01-19 NOTE — PHYSICAL THERAPY INITIAL EVALUATION ADULT - IMPAIRMENTS CONTRIBUTING TO GAIT DEVIATIONS, PT EVAL
decrease endurance , see gait comments above/impaired balance/impaired postural control/impaired sensory feedback

## 2025-01-19 NOTE — PHYSICAL THERAPY INITIAL EVALUATION ADULT - NSPTDMEREC_GEN_A_CORE
pt owns a tri wheeled rolling walker , regular rolling walker , std cane ,grab bars in low tub shower ; PT recommend shower seat or bench , PT issued std cane as pt's had tightening screw like piece missing

## 2025-01-20 ENCOUNTER — TRANSCRIPTION ENCOUNTER (OUTPATIENT)
Age: 89
End: 2025-01-20

## 2025-01-20 VITALS
SYSTOLIC BLOOD PRESSURE: 137 MMHG | HEART RATE: 94 BPM | DIASTOLIC BLOOD PRESSURE: 70 MMHG | OXYGEN SATURATION: 96 % | TEMPERATURE: 98 F | RESPIRATION RATE: 18 BRPM

## 2025-01-20 PROCEDURE — 70496 CT ANGIOGRAPHY HEAD: CPT | Mod: MC

## 2025-01-20 PROCEDURE — 80053 COMPREHEN METABOLIC PANEL: CPT

## 2025-01-20 PROCEDURE — 81001 URINALYSIS AUTO W/SCOPE: CPT

## 2025-01-20 PROCEDURE — 85730 THROMBOPLASTIN TIME PARTIAL: CPT

## 2025-01-20 PROCEDURE — 84295 ASSAY OF SERUM SODIUM: CPT

## 2025-01-20 PROCEDURE — 85014 HEMATOCRIT: CPT

## 2025-01-20 PROCEDURE — 82947 ASSAY GLUCOSE BLOOD QUANT: CPT

## 2025-01-20 PROCEDURE — 82435 ASSAY OF BLOOD CHLORIDE: CPT

## 2025-01-20 PROCEDURE — 85610 PROTHROMBIN TIME: CPT

## 2025-01-20 PROCEDURE — 82803 BLOOD GASES ANY COMBINATION: CPT

## 2025-01-20 PROCEDURE — 99285 EMERGENCY DEPT VISIT HI MDM: CPT

## 2025-01-20 PROCEDURE — 85025 COMPLETE CBC W/AUTO DIFF WBC: CPT

## 2025-01-20 PROCEDURE — 70450 CT HEAD/BRAIN W/O DYE: CPT | Mod: MC

## 2025-01-20 PROCEDURE — 70498 CT ANGIOGRAPHY NECK: CPT | Mod: MC

## 2025-01-20 PROCEDURE — 82330 ASSAY OF CALCIUM: CPT

## 2025-01-20 PROCEDURE — 84484 ASSAY OF TROPONIN QUANT: CPT

## 2025-01-20 PROCEDURE — 83605 ASSAY OF LACTIC ACID: CPT

## 2025-01-20 PROCEDURE — 97165 OT EVAL LOW COMPLEX 30 MIN: CPT

## 2025-01-20 PROCEDURE — 82962 GLUCOSE BLOOD TEST: CPT

## 2025-01-20 PROCEDURE — 84132 ASSAY OF SERUM POTASSIUM: CPT

## 2025-01-20 PROCEDURE — 80061 LIPID PANEL: CPT

## 2025-01-20 PROCEDURE — 70551 MRI BRAIN STEM W/O DYE: CPT | Mod: MC

## 2025-01-20 PROCEDURE — 87086 URINE CULTURE/COLONY COUNT: CPT

## 2025-01-20 PROCEDURE — 85018 HEMOGLOBIN: CPT

## 2025-01-20 PROCEDURE — 97161 PT EVAL LOW COMPLEX 20 MIN: CPT

## 2025-01-20 RX ORDER — SERTRALINE HCL 100 MG
1 TABLET ORAL
Qty: 30 | Refills: 0
Start: 2025-01-20 | End: 2025-02-18

## 2025-01-20 RX ORDER — AMLODIPINE BESYLATE 5 MG
1 TABLET ORAL
Refills: 0 | DISCHARGE

## 2025-01-20 RX ADMIN — ROSUVASTATIN CALCIUM 40 MILLIGRAM(S): 10 TABLET, FILM COATED ORAL at 05:51

## 2025-01-20 RX ADMIN — ASPIRIN 81 MILLIGRAM(S): 81 TABLET, COATED ORAL at 11:11

## 2025-01-20 RX ADMIN — Medication 1 GRAM(S): at 11:11

## 2025-01-20 RX ADMIN — PREDNISONE 3 MILLIGRAM(S): 5 TABLET ORAL at 05:51

## 2025-01-20 RX ADMIN — PANTOPRAZOLE 40 MILLIGRAM(S): 20 TABLET, DELAYED RELEASE ORAL at 05:51

## 2025-01-20 RX ADMIN — Medication 50 MILLIGRAM(S): at 05:51

## 2025-01-20 NOTE — DISCHARGE NOTE NURSING/CASE MANAGEMENT/SOCIAL WORK - PATIENT PORTAL LINK FT
You can access the FollowMyHealth Patient Portal offered by [facility] by registering at the following website: [email or web address]. By joining [facility]’s FollowMyHealth portal, you will also be able to view your health information using other applications (apps) compatible with our system.

## 2025-01-20 NOTE — DISCHARGE NOTE PROVIDER - NSDCADMDATE_GEN_ALL_CORE_FT
Subjective:   Duyen Carmona is a 78 year old female who presents for a MA (Medicare Advantage) Supervisit (Once per calendar year) and scheduled follow up of multiple significant but stable problems and Follow-up on hypertension hypercholesterolemia hyperparathyroidism vitamin D deficiency and go over test results. .  Varicose veins.    Patient  has varicose veins/spider veins bilateral lower extremities.  1 time she had significant bleeding from the varicosities lateral aspect of the left leg after shaving.  Concerned with leaving alone and possible injury or fall.  Refer patient to surgeon for evaluation.    Vitamin D deficiency monitor blood work stable, blood work ordered by endocrinologist is pending.    Hypercholesterolemia on medication doing well numbers are very well controlled.  Doing well.    Hypertension blood pressure stable continue current medications monitor blood work.  Stable.    Postmenopausal check bone density scan further recommendation pending test results    Primary hyperparathyroidism -patient started on medication by endocrinologist levels are monitored by them for PTH.  Calcium level came back normal.   Monitor close.    Multiple thyroid nodules nodules are stable monitor once a year recheck ultrasound in a year.      Carotid artery stenosis monitor continue cholesterol medication and aspirin.    Cerebral microvascular disease on MRI monitor stable    Osteoporosis of femoral neck monitor DEXA scan , on current medications Fosamax.  Prescribed by endocrinologist.  She is tolerating medication very well.    History/Other:   Fall Risk Assessment:   She has been screened for Falls and is low risk.      Cognitive Assessment:   She had a completely normal cognitive assessment - see flowsheet entries     Functional Ability/Status:   Duyen Carmona has a completely normal functional assessment. See flowsheet for details.        Depression Screening (PHQ-2/PHQ-9): PHQ-2 SCORE: 0  , done  6/26/2024       Advanced Directives:   She does NOT have a Living Will. [Do you have a living will?: No]  She does NOT have a Power of  for Health Care. [Do you have a healthcare power of ?: No]  Discussed Advance Care Planning with patient (and family/surrogate if present). Standard forms made available to patient in After Visit Summary.      Patient Active Problem List   Diagnosis    Hypertension    Hyperlipidemia    Lichen planus    Osteopenia    Vitamin D deficiency    Primary hyperparathyroidism (HCC)    Obesity (BMI 30.0-34.9)    Thyroid cyst    Thyroid nodule    Toenail avulsion    Boil, buttock    Infected open wound    Laceration of left lower extremity    Osteoporosis    Multilevel degenerative disc disease    Diastolic dysfunction    Cerebral microvascular disease    Bilateral carotid artery stenosis    Sinus bradycardia    Hyperlipidemia, mixed     Allergies:  She is allergic to cefadroxil, bactrim [sulfamethoxazole w/trimethoprim], omeprazole, and pantoprazole.    Current Medications:  Outpatient Medications Marked as Taking for the 6/26/24 encounter (Office Visit) with Ivette Marshall MD   Medication Sig    lisinopril 40 MG Oral Tab Take 1 tablet (40 mg total) by mouth daily.    rosuvastatin 5 MG Oral Tab Take 1 tablet (5 mg total) by mouth nightly.    amLODIPine 10 MG Oral Tab Take 1 tablet (10 mg total) by mouth daily.    cinacalcet 30 MG Oral Tab Take 1 tablet (30 mg total) by mouth daily with breakfast.    alendronate 70 MG Oral Tab Take 1 tablet (70 mg total) by mouth every 7 days.    aspirin 81 MG Oral Tab EC Take 1 tablet (81 mg total) by mouth daily.    Vitamin D3 2000 units Oral Cap Take 1 capsule (2,000 Units total) by mouth daily.       Medical History:  She  has a past medical history of Enlarged lymph node (5/10/2015), Hypercalcemia, Obesity (BMI 30.0-34.9) (5/30/2018), Otitis media of left ear (5/10/2015), Unspecified essential hypertension, and Vitamin D  deficiency.  Surgical History:  She  has a past surgical history that includes colonoscopy (2011); tonsillectomy; cataract (11/03/2017); and cataract (2011).   Family History:  Her family history includes Cancer in her brother; Heart Disorder in her father and mother; Hypertension in her mother and sister; Other in her sister.  Social History:  She  reports that she has never smoked. She has never used smokeless tobacco. She reports that she does not drink alcohol and does not use drugs.    Tobacco:  She has never smoked tobacco.    CAGE Alcohol Screen:   CAGE screening score of 0 on 6/19/2024, showing low risk of alcohol abuse.      Patient Care Team:  Ivette Marshall MD as PCP - General (Family Medicine)    Review of Systems  GENERAL: feels well otherwise  SKIN: denies any unusual skin lesions  EYES: denies blurred vision or double vision  HEENT: denies nasal congestion, sinus pain or ST  LUNGS: denies shortness of breath with exertion  CARDIOVASCULAR: denies chest pain on exertion  GI: denies abdominal pain, denies heartburn  : denies dysuria, vaginal discharge or itching, no complaint of urinary incontinence   MUSCULOSKELETAL: denies back pain, varicose veins bilateral lower legs  NEURO: denies headaches  PSYCHE: denies depression or anxiety  HEMATOLOGIC: denies hx of anemia  ENDOCRINE: denies thyroid history  ALL/ASTHMA: denies hx of allergy or asthma    Objective:   Physical Exam  General Appearance:  Alert, cooperative, no distress, appears stated age   Head:  Normocephalic, without obvious abnormality, atraumatic   Eyes:  PERRL, conjunctiva/corneas clear, EOM's intact both eyes   Ears:  Normal TM's and external ear canals, both ears   Nose: Nares normal, septum midline,mucosa normal, no drainage or sinus tenderness   Throat: Lips, mucosa, and tongue normal; teeth and gums normal   Neck: Supple, symmetrical, trachea midline, no adenopathy;  thyroid: not enlarged, symmetric, no tenderness/mass/nodules;  no carotid bruit or JVD   Back:   Symmetric, no curvature, ROM normal, no CVA tenderness   Lungs:   Clear to auscultation bilaterally, respirations unlabored   Heart:  Regular rate and rhythm, S1 and S2 normal, no murmur, rub, or gallop   Abdomen:   Soft, non-tender, bowel sounds active all four quadrants,  no masses, no organomegaly   Pelvic: Deferred  Breast examination no palpable masses no supraclavicular no axilla lymphadenopathy   Extremities: Extremities normal, atraumatic, no cyanosis or edema, varicose veins bilateral lower extremity   Pulses: 2+ and symmetric   Skin: Skin color, texture, turgor normal, no rashes or lesions   Lymph nodes: Cervical, supraclavicular, and axillary nodes normal   Neurologic: Normal       /68 (BP Location: Left arm, Patient Position: Sitting, Cuff Size: adult)   Pulse 78   Temp 98 °F (36.7 °C) (Temporal)   Resp 14   Ht 5' 5\" (1.651 m)   Wt 164 lb 12.8 oz (74.8 kg)   BMI 27.42 kg/m²  Estimated body mass index is 27.42 kg/m² as calculated from the following:    Height as of this encounter: 5' 5\" (1.651 m).    Weight as of this encounter: 164 lb 12.8 oz (74.8 kg).    Medicare Hearing Assessment:   Hearing Screening    Time taken: 6/26/2024 10:42 AM  Screening Method: Finger Rub  Finger Rub Result: Pass         Visual Acuity:   Right Eye Visual Acuity: Corrected Right Eye Chart Acuity: 20/30   Left Eye Visual Acuity: Corrected Left Eye Chart Acuity: 20/25   Both Eyes Visual Acuity: Corrected Both Eyes Chart Acuity: 20/25   Able To Tolerate Visual Acuity: Yes      Results for orders placed or performed in visit on 06/12/24   Comp Metabolic Panel (14)   Result Value Ref Range    Glucose 86 70 - 99 mg/dL    Sodium 143 136 - 145 mmol/L    Potassium 3.9 3.5 - 5.1 mmol/L    Chloride 108 98 - 112 mmol/L    CO2 27.0 21.0 - 32.0 mmol/L    Anion Gap 8 0 - 18 mmol/L    BUN 12 9 - 23 mg/dL    Creatinine 0.87 0.55 - 1.02 mg/dL    BUN/CREA Ratio 13.8 10.0 - 20.0    Calcium, Total 9.0  8.7 - 10.4 mg/dL    Calculated Osmolality 295 275 - 295 mOsm/kg    eGFR-Cr 68 >=60 mL/min/1.73m2    ALT 12 10 - 49 U/L    AST 25 <=34 U/L    Alkaline Phosphatase 62 55 - 142 U/L    Bilirubin, Total 0.7 0.2 - 1.1 mg/dL    Total Protein 7.3 5.7 - 8.2 g/dL    Albumin 4.4 3.2 - 4.8 g/dL    Globulin  2.9 2.0 - 3.5 g/dL    A/G Ratio 1.5 1.0 - 2.0    Patient Fasting for CMP? Yes    Lipid Panel   Result Value Ref Range    Cholesterol, Total 178 <200 mg/dL    HDL Cholesterol 76 (H) 40 - 59 mg/dL    Triglycerides 97 30 - 149 mg/dL    LDL Cholesterol 85 <100 mg/dL    VLDL 15 0 - 30 mg/dL    Non HDL Chol 102 <130 mg/dL    Patient Fasting for Lipid? Yes    Urinalysis with Culture Reflex    Specimen: Urine, clean catch   Result Value Ref Range    Urine Color Light-Yellow Yellow    Clarity Urine Clear Clear    Spec Gravity 1.013 1.005 - 1.030    Glucose Urine Normal Normal mg/dL    Bilirubin Urine Negative Negative    Ketones Urine Negative Negative mg/dL    Blood Urine Negative Negative    pH Urine 7.0 5.0 - 8.0    Protein Urine Negative Negative mg/dL    Urobilinogen Urine Normal Normal    Nitrite Urine Negative Negative    Leukocyte Esterase Urine Negative Negative    Microscopic Microscopic not indicated    TSH W Reflex To Free T4 [E]   Result Value Ref Range    TSH 3.278 0.550 - 4.780 mIU/mL   CBC W/ DIFFERENTIAL   Result Value Ref Range    WBC 4.9 4.0 - 11.0 x10(3) uL    RBC 4.30 3.80 - 5.30 x10(6)uL    HGB 13.1 12.0 - 16.0 g/dL    HCT 39.4 35.0 - 48.0 %    MCV 91.6 80.0 - 100.0 fL    MCH 30.5 26.0 - 34.0 pg    MCHC 33.2 31.0 - 37.0 g/dL    RDW-SD 41.1 35.1 - 46.3 fL    RDW 12.3 11.0 - 15.0 %    .0 150.0 - 450.0 10(3)uL    Neutrophil Absolute Prelim 2.29 1.50 - 7.70 x10 (3) uL    Neutrophil Absolute 2.29 1.50 - 7.70 x10(3) uL    Lymphocyte Absolute 1.76 1.00 - 4.00 x10(3) uL    Monocyte Absolute 0.58 0.10 - 1.00 x10(3) uL    Eosinophil Absolute 0.20 0.00 - 0.70 x10(3) uL    Basophil Absolute 0.04 0.00 - 0.20 x10(3) uL     Immature Granulocyte Absolute 0.01 0.00 - 1.00 x10(3) uL    Neutrophil % 46.9 %    Lymphocyte % 36.1 %    Monocyte % 11.9 %    Eosinophil % 4.1 %    Basophil % 0.8 %    Immature Granulocyte % 0.2 %     Narrative   PROCEDURE:  US THYROID (CPT=76536)     COMPARISON:  [facility], US, US THYROID (CPT=76536), 6/15/2023, 12:56 PM.  [facility], US, US THYROID (CPT=76536), 6/12/2021, 3:07 PM.     INDICATIONS:  E04.1 Thyroid cyst E04.1 Thyroid nodule     TECHNIQUE:  High-resolution ultrasound of the thyroid gland was performed.     PATIENT STATED HISTORY: (As transcribed by Technologist)  Patient has a history of thyroid nodules.         FINDINGS:       MEASUREMENTS:  RIGHT LOBE:  4.8 x 1.3 x 1.3 cm, previously 4.6 x 1.5 x 1.5 cm  LEFT LOBE:  4.8 x 1.1 x 1.2 cm, previously 5.2 x 1.3 x 1.5 cm  ISTHMUS:  0.23 cm     NODULES:  There is a stable 7 x 4 x 7 mm right mid lobe/isthmus cyst.  There is a stable 6 x 4 x 4 mm left lower lobe cyst.     OTHER:  None.                   Impression   CONCLUSION:  Stable thyroid cysts.        ACR TI-RADS recommendations:  TR5 - FNA if greater than or equal to 1 cm, follow-up if 0.5-0.9 cm every year for 5 years.  TR4 - FNA if greater than or equal to 1.5 cm, follow-up if 1-1.4 cm in 1, 2, 3 and 5 years.  TR3 - FNA if greater than or equal to 2.5 cm, follow-up if 1.5-2.4 cm in 1, 3 and 5 years  TR1 and TR2 - no FNA or follow-up     Please note ACR TI-RADS recommendations are based on imaging features and size of the nodule only.  In certain clinical circumstances additional or alternative evaluation may be indicated.        LOCATION:  [facility]                       Dictated by (CST): Conrad Tomas MD on 6/15/2024 at 11:28 AM      Finalized by (CST): Conrad Tomas MD on 6/15/2024 at 11:29 AM       Assessment & Plan:   Duyen Carmona is a 78 year old female who presents for a Medicare Assessment.     1. Encounter for annual health examination (Primary)  2. Hyperlipidemia, mixed  -     Comp  Metabolic Panel (14); Future; Expected date: 09/16/2024  -     Lipid Panel; Future; Expected date: 09/16/2024  3. Essential hypertension  4. Varicose veins  -     Surgery Referral - In Network  5. Mixed hyperlipidemia  6. Primary hyperparathyroidism (HCC)  Overview:  Endocrine.  Dr Jorge A Schultz.    7. Age-related osteoporosis without current pathological fracture  8. Vitamin D deficiency  9. Multiple thyroid nodules      Varicose veins refer to surgeon for evaluation referral was placed.  Some injections to close the superficial veins due to bleeding in the past and risk of recurrent bleeding.    Vitamin D deficiency stable monitor.    Hyperlipidemia stable continue present management    Hypertension stable continue present management    Postmenopausal check DEXA scan further recommendation pending test result    Primary hyperparathyroidism requires monitoring on medication by endocrinologist tolerates well.,     Multiple thyroid nodules stable continue present management    Bilateral carotid artery stenosis stable continue present management    Osteoporosis requires monitoring DEXA scan , on Fosamax doing well continue present management.      Cerebral microvascular disease on MRI continue cholesterol medication and aspirin monitor.    RSV shot.   Healthy diet.  Stay active.   See surgeon Dr. Healy for evaluation of varicose veins.  Do fasting blood work prior next visit.    The patient indicates understanding of these issues and agrees to the plan.  Imaging studies ordered.  Lab work ordered.  Reinforced healthy diet, lifestyle, and exercise.      Return in about 4 months (around 10/26/2024).     Ivette Marshall MD, 6/20/2023     Supplementary Documentation:   General Health:  In the past six months, have you lost more than 10 pounds without trying?: 2 - No  Has your appetite been poor?: No  Type of Diet: Balanced  How does the patient maintain a good energy level?: Other  How would you describe your daily  physical activity?: Light  How would you describe your current health state?: Good  How do you maintain positive mental well-being?: Social Interaction  On a scale of 0 to 10, with 0 being no pain and 10 being severe pain, what is your pain level?: 0 - (None)  In the past six months, have you experienced urine leakage?: 0-No  At any time do you feel concerned for the safety/well-being of yourself and/or your children, in your home or elsewhere?: No  Have you had any immunizations at another office such as Influenza, Hepatitis B, Tetanus, or Pneumococcal?: No       Duyen Carmona's SCREENING SCHEDULE   Tests on this list are recommended by your physician but may not be covered, or covered at this frequency, by your insurer.   Please check with your insurance carrier before scheduling to verify coverage.   PREVENTATIVE SERVICES FREQUENCY &  COVERAGE DETAILS LAST COMPLETION DATE   Diabetes Screening    Fasting Blood Sugar /  Glucose    One screening every 12 months if never tested or if previously tested but not diagnosed with pre-diabetes   One screening every 6 months if diagnosed with pre-diabetes Lab Results   Component Value Date    GLU 86 06/12/2024        Cardiovascular Disease Screening    Lipid Panel  Cholesterol  Lipoprotein (HDL)  Triglycerides Covered every 5 years for all Medicare beneficiaries without apparent signs or symptoms of cardiovascular disease Lab Results   Component Value Date    CHOLEST 178 06/12/2024    HDL 76 (H) 06/12/2024    LDL 85 06/12/2024    TRIG 97 06/12/2024         Electrocardiogram (EKG)   Covered if needed at Welcome to Medicare, and non-screening if indicated for medical reasons 12/07/2021      Ultrasound Screening for Abdominal Aortic Aneurysm (AAA) Covered once in a lifetime for one of the following risk factors    Men who are 65-75 years old and have ever smoked    Anyone with a family history -     Colorectal Cancer Screening  Covered for ages 50-85; only need ONE of the  following:    Colonoscopy   Covered every 10 years    Covered every 2 years if patient is at high risk or previous colonoscopy was abnormal -    No recommendations at this time    Flexible Sigmoidoscopy   Covered every 4 years -    Fecal Occult Blood Test Covered annually -   Bone Density Screening    Bone density screening    Covered every 2 years after age 65 if diagnosed with risk of osteoporosis or estrogen deficiency.    Covered yearly for long-term glucocorticoid medication use (Steroids) Last Dexa Scan:    XR DEXA BONE DENSITOMETRY (CPT=77080) 10/02/2023      No recommendations at this time   Pap and Pelvic    Pap   Covered every 2 years for women at normal risk; Annually if at high risk -  No recommendations at this time    Chlamydia Annually if high risk -  No recommendations at this time   Screening Mammogram    Mammogram     Recommend annually for all female patients aged 40 and older    One baseline mammogram covered for patients aged 35-39 11/08/2023    No recommendations at this time    Immunizations    Influenza Covered once per flu season  Please get every year 10/25/2023  No recommendations at this time    Pneumococcal Each vaccine ([id number] & [id number]) covered once after 65 Prevnar 13: 05/18/2016    [id number]: 05/24/2017     No recommendations at this time    Hepatitis B One screening covered for patients with certain risk factors   -  No recommendations at this time    Tetanus Toxoid Not covered by Medicare Part B unless medically necessary (cut with metal); may be covered with your pharmacy prescription benefits 03/12/2020    Tetanus, Diptheria and Pertusis TD and TDaP Not covered by Medicare Part B -  No recommendations at this time    Zoster Not covered by Medicare Part B; may be covered with your pharmacy  prescription benefits 02/06/2017  Zoster Vaccines(2 of 3) due on 04/03/2017     Annual Monitoring of Persistent Medications (ACE/ARB, digoxin diuretics, anticonvulsants)    Potassium  Annually Lab Results   Component Value Date    K 3.9 06/12/2024         Creatinine   Annually Lab Results   Component Value Date    CREATSERUM 0.87 06/12/2024         BUN Annually Lab Results   Component Value Date    BUN 12 06/12/2024       Drug Serum Conc Annually No results found for: \"DIGOXIN\", \"DIG\", \"VALP\"              18-Jan-2025 03:00

## 2025-01-20 NOTE — DISCHARGE NOTE PROVIDER - CARE PROVIDER_API CALL
Isaac Figueroa  Cardiovascular Disease  [facility], [address] [address]  Phone: [phone]  Fax: [phone]  Established Patient  Follow Up Time:

## 2025-01-20 NOTE — PROGRESS NOTE ADULT - ASSESSMENT
Impression:  Symptoms resolved now without recurrence, unclear etiology.  No demonstration of hypotension or orthostasis.  ? TIA                       No evident arrythmia to account for event.    Plan: D/C to home on same meds as outp[atient regimen.             Office f/u.

## 2025-01-20 NOTE — DISCHARGE NOTE NURSING/CASE MANAGEMENT/SOCIAL WORK - FINANCIAL ASSISTANCE
[facility] provides services at a reduced cost to those who are determined to be eligible through [facility]’s financial assistance program. Information regarding [facility]’s financial assistance program can be found by going to [email or web address] or by calling [phone].

## 2025-01-20 NOTE — DISCHARGE NOTE PROVIDER - HOSPITAL COURSE
HPI:  89yoF w/ PMHx of paroxysmal SVT s/p ablation, HTN, moderate AS, PMR, GERD, IBS, who presents with complaints of multiple episode of near-syncope. Patient states that at 7PM last night, patient had an episode where she felt like she had bilateral hearing loss and then becoming unable to speak while on the phone with her friend. This episode lasted for a couple of minutes and she was unable to move (while she was sitting down) during the time. She then had a recurrence of these symptoms again at around 9PM while playing the piano. In between episodes, patient notes she feels generally weak. Of note, patient was hospitalized in October 2024 with similar complaints, found to have at the time: moderate stenosis in the proximal right internal carotid artery and mild to moderate stenosis in the proximal left internal carotid artery, no intervention per inpatient neurology, along with mild orthostasis. Patient denies current, recent, or keya-event CP, palpitations, SOB/TAPIA, LE edema, vision changes, fever/chills, sick contacts, cough, abd pain, n/v/d/c, nor urinary complaints.     ED Presenting Vitals: 97.4F, 68bpm, 198/82 --> 153/74, 18br/m, 98% on RA   ED Course: code stroke was called, neurology consulted  (18 Jan 2025 11:12)    Hospital Course:  Syncopal like episodes with hearing loss and subjective aphasia that has resolved of unknown etiology. Code stroke in ED, CTH and MRI neg   Neurology called to consult.  NIHSS Scoring:     NIHSS 0  MRS 1  LKW 1/17 1900    ABCD2 3  Symptoms resolved now without recurrence, unclear etiology.  No demonstration of hypotension or orthostasis.  ? TIA; No evident arrythmia to account for event.  Pt is medically cleared for discharge to home.  She will follow up with Dr. Figueroa next week.     Important Medication Changes and Reason:  NA  Active or Pending Issues Requiring Follow-up:  Follow up with Dr. Figueroa in 1 week.   Advanced Directives:   [ x] Full code  [ ] DNR  [ ] Hospice    Discharge Diagnoses:  Syncope  Possible TIA

## 2025-01-20 NOTE — DISCHARGE NOTE PROVIDER - NSDCMRMEDTOKEN_GEN_ALL_CORE_FT
aspirin 81 mg oral delayed release tablet: 1 tab(s) orally once a day  famotidine 20 mg oral tablet: 1 tab(s) orally once a day as needed  losartan 50 mg oral tablet: 1 tab(s) orally once a day  metoprolol succinate 25 mg oral tablet, extended release: 1 tab(s) orally once a day (at bedtime) MDD: 1  predniSONE 1 mg oral tablet: 3 tab(s) orally once a day  PriLOSEC 20 mg oral delayed release capsule: 1 cap(s) orally once a day  rosuvastatin 40 mg oral tablet: 1 tab(s) orally once a day (at bedtime)  sertraline 25 mg oral tablet: 1 tab(s) orally once a day  sodium chloride 1 g oral tablet: 1 tab(s) orally once a day  Vitamin B-12 1000 mcg sublingual tablet: 1 tab(s) sublingually once a day  Vitamin D 1000IU: 1 tablet orally once a day  Xanax 0.25 mg oral tablet: 1 tab(s) orally once a day as needed for  anxiety

## 2025-01-20 NOTE — DISCHARGE NOTE NURSING/CASE MANAGEMENT/SOCIAL WORK - NURSING SECTION COMPLETE
Patient/Caregiver provided printed discharge information. Melolabial Transposition Flap Text: The defect edges were debeveled with a #15 scalpel blade.  Given the location of the defect and the proximity to free margins a melolabial flap was deemed most appropriate.  Using a sterile surgical marker, an appropriate melolabial transposition flap was drawn incorporating the defect.    The area thus outlined was incised deep to adipose tissue with a #15 scalpel blade.  The skin margins were undermined to an appropriate distance in all directions utilizing iris scissors.

## 2025-01-20 NOTE — DISCHARGE NOTE PROVIDER - NSDCCPCAREPLAN_GEN_ALL_CORE_FT
PRINCIPAL DISCHARGE DIAGNOSIS  Diagnosis: Near syncope  Assessment and Plan of Treatment: Seen by Neurology.    CT Head negative'  MRI head negative.   Amlodipine in hold for now until seen by Dr. Chapman to restart medication.   Follow up in 1 week.

## 2025-01-20 NOTE — PROGRESS NOTE ADULT - SUBJECTIVE AND OBJECTIVE BOX
No further episodes regarding acute loss of hearing or inability to speak.  No other c/o.    Orthostatics were negative.    Some ra on tele generally during sleeping hours but insignificant to account for symptoms.    MRI without evidence of acute CVA.        REVIEW OF SYSTEMS:  CARDIOVASCULAR: No chest pain, dyspnea or palpitations  All other review of systems is negative unless indicated above    Medications:  ALPRAZolam 0.25 milliGRAM(s) Oral at bedtime  aspirin enteric coated 81 milliGRAM(s) Oral daily  losartan 50 milliGRAM(s) Oral daily  metoprolol succinate ER 25 milliGRAM(s) Oral at bedtime  pantoprazole    Tablet 40 milliGRAM(s) Oral before breakfast  predniSONE   Tablet 3 milliGRAM(s) Oral daily  rosuvastatin 40 milliGRAM(s) Oral at bedtime  sertraline 25 milliGRAM(s) Oral daily  sodium chloride 1 Gram(s) Oral daily      Physical Exam:  Vitals:  T(C): 36.4 (01-19-25 @ 12:02), Max: 36.9 (01-19-25 @ 08:27)  HR: 65 (01-19-25 @ 12:02) (60 - 86)  BP: 116/69 (01-19-25 @ 12:02) (116/69 - 143/74)  BP(mean): --  RR: 18 (01-19-25 @ 12:02) (15 - 18)  SpO2: 95% (01-19-25 @ 12:02) (94% - 99%)  Wt(kg): --  Daily     Daily   I&O's Summary    18 Jan 2025 07:01  -  19 Jan 2025 07:00  --------------------------------------------------------  IN: 0 mL / OUT: 200 mL / NET: -200 mL        Appearance:  Normal, NAD  Eyes:   EOMI  HEENT: Normal oral mucosa NC/AT  Neck:  No JVD  Respiratory: Clear to auscultation bilaterally  Cardiovascular: S1 obscured by 1/6 GREGORIO base and LSB.  S2 single.  No rubs or gallops  Abdomen:   BS normal, Soft,  Non-tender without organomegaly or masses  Extremities: Without edema      01-18    Complete Blood Count + Automated Diff (01.18.25 @ 00:09)   WBC Count: 9.17 K/uL  RBC Count: 4.19 M/uL  Hemoglobin: 11.5 g/dL  Hematocrit: 35.5 %  Mean Cell Volume: 84.7 fl  Mean Cell Hemoglobin: 27.4 pg  Mean Cell Hemoglobin Conc: 32.4 g/dL  Red Cell Distrib Width: 14.5 %  Platelet Count - Automated: 328 K/uL  Auto Neutrophil %: 70.1: Differential percentages must be correlated with absolute numbers for   clinical significance. %  Auto Lymphocyte %: 20.9 %  Auto Monocyte %: 8.0 %  Auto Eosinophil %: 0.5 %  Auto Basophil %: 0.3 %  Auto Immature Granulocyte %: 0.2: (Includes meta, myelo and promyelocytes). Mild elevations in immature   granulocytes may be seen with many inflammatory processes and pregnancy;   clinical correlation suggested. %  Nucleated RBC: 0 /100 WBCs    135  |  96  |  8   ----------------------------<  93  4.3   |  25  |  0.58    Ca    9.8      18 Jan 2025 00:09    TPro  7.2  /  Alb  4.3  /  TBili  0.3  /  DBili  x   /  AST  18  /  ALT  10  /  AlkPhos  60  01-18    PT/INR - ( 18 Jan 2025 00:09 )   PT: 11.7 sec;   INR: 1.02 ratio         PTT - ( 18 Jan 2025 00:09 )  PTT:21.5 sec      
No further episodes of altered awareness, hearing or speechdifficulty.    No significant arrhythmia.        REVIEW OF SYSTEMS:  CARDIOVASCULAR: No chest pain, dyspnea or palpitations  All other review of systems is negative unless indicated above    Medications:  ALPRAZolam 0.25 milliGRAM(s) Oral at bedtime  aspirin enteric coated 81 milliGRAM(s) Oral daily  losartan 50 milliGRAM(s) Oral daily  metoprolol succinate ER 25 milliGRAM(s) Oral at bedtime  pantoprazole    Tablet 40 milliGRAM(s) Oral before breakfast  predniSONE   Tablet 3 milliGRAM(s) Oral daily  rosuvastatin 40 milliGRAM(s) Oral at bedtime  sertraline 25 milliGRAM(s) Oral daily  sodium chloride 1 Gram(s) Oral daily      Physical Exam:  Vitals:  T(C): 37.1 (01-20-25 @ 04:50), Max: 37.1 (01-20-25 @ 04:50)  HR: 66 (01-20-25 @ 04:50) (64 - 78)  BP: 144/57 (01-20-25 @ 04:50) (116/69 - 144/62)  BP(mean): --  RR: 18 (01-20-25 @ 04:50) (18 - 18)  SpO2: 97% (01-20-25 @ 04:50) (95% - 97%)  Wt(kg): --  Daily     Daily   I&O's Summary    Appearance:  Normal, NAD  Eyes:   EOMI  HEENT: Normal oral mucosa NC/AT  Neck:  No JVD  Respiratory: Clear to auscultation bilaterally  Cardiovascular: S1 obscured by 1/6 GREGORIO base and LSB.  S2 single.  No rubs or gallops  Abdomen:   BS normal, Soft,  Non-tender without organomegaly or masses  Extremities: Without edema

## 2025-06-05 ENCOUNTER — APPOINTMENT (OUTPATIENT)
Dept: ORTHOPEDIC SURGERY | Facility: CLINIC | Age: 89
End: 2025-06-05
Payer: MEDICARE

## 2025-06-05 VITALS — WEIGHT: 120 LBS | BODY MASS INDEX: 23.56 KG/M2 | HEIGHT: 60 IN

## 2025-06-05 DIAGNOSIS — M35.3 POLYMYALGIA RHEUMATICA: ICD-10-CM

## 2025-06-05 DIAGNOSIS — Z87.39 PERSONAL HISTORY OF OTHER DISEASES OF THE MUSCULOSKELETAL SYSTEM AND CONNECTIVE TISSUE: ICD-10-CM

## 2025-06-05 DIAGNOSIS — M06.9 RHEUMATOID ARTHRITIS, UNSPECIFIED: ICD-10-CM

## 2025-06-05 PROCEDURE — 72110 X-RAY EXAM L-2 SPINE 4/>VWS: CPT

## 2025-06-05 PROCEDURE — 72070 X-RAY EXAM THORAC SPINE 2VWS: CPT

## 2025-06-05 PROCEDURE — 72170 X-RAY EXAM OF PELVIS: CPT

## 2025-06-05 PROCEDURE — 99204 OFFICE O/P NEW MOD 45 MIN: CPT

## 2025-06-05 RX ORDER — SERTRALINE HYDROCHLORIDE 25 MG/1
TABLET, FILM COATED ORAL
Refills: 0 | Status: ACTIVE | COMMUNITY

## 2025-06-30 ENCOUNTER — NON-APPOINTMENT (OUTPATIENT)
Age: 89
End: 2025-06-30
